# Patient Record
Sex: FEMALE | Race: WHITE | Employment: FULL TIME | URBAN - METROPOLITAN AREA
[De-identification: names, ages, dates, MRNs, and addresses within clinical notes are randomized per-mention and may not be internally consistent; named-entity substitution may affect disease eponyms.]

---

## 2017-01-05 ENCOUNTER — OFFICE VISIT (OUTPATIENT)
Dept: FAMILY MEDICINE CLINIC | Age: 52
End: 2017-01-05

## 2017-01-05 VITALS
OXYGEN SATURATION: 97 % | RESPIRATION RATE: 12 BRPM | HEIGHT: 65 IN | TEMPERATURE: 98.1 F | DIASTOLIC BLOOD PRESSURE: 87 MMHG | BODY MASS INDEX: 42.49 KG/M2 | WEIGHT: 255 LBS | HEART RATE: 71 BPM | SYSTOLIC BLOOD PRESSURE: 136 MMHG

## 2017-01-05 DIAGNOSIS — F32.A ANXIETY AND DEPRESSION: Primary | ICD-10-CM

## 2017-01-05 DIAGNOSIS — M79.642 PAIN IN BOTH HANDS: ICD-10-CM

## 2017-01-05 DIAGNOSIS — Z12.39 SCREENING FOR MALIGNANT NEOPLASM OF BREAST: ICD-10-CM

## 2017-01-05 DIAGNOSIS — M79.641 PAIN IN BOTH HANDS: ICD-10-CM

## 2017-01-05 DIAGNOSIS — F41.9 ANXIETY AND DEPRESSION: Primary | ICD-10-CM

## 2017-01-05 RX ORDER — BUPROPION HYDROCHLORIDE 300 MG/1
TABLET ORAL
COMMUNITY
Start: 2016-02-24 | End: 2016-02-24

## 2017-01-05 RX ORDER — DICLOFENAC SODIUM 75 MG/1
75 TABLET, DELAYED RELEASE ORAL
Qty: 60 TAB | Refills: 0 | Status: SHIPPED | OUTPATIENT
Start: 2017-01-05 | End: 2018-07-19

## 2017-01-05 RX ORDER — BUPROPION HYDROCHLORIDE 300 MG/1
300 TABLET ORAL
Qty: 90 TAB | Refills: 1 | Status: SHIPPED | OUTPATIENT
Start: 2017-01-05 | End: 2018-07-19 | Stop reason: SDUPTHER

## 2017-01-05 RX ORDER — DICLOFENAC SODIUM 10 MG/G
GEL TOPICAL 4 TIMES DAILY
Qty: 100 G | Refills: 5 | Status: SHIPPED | OUTPATIENT
Start: 2017-01-05 | End: 2018-10-10

## 2017-01-05 RX ORDER — ESCITALOPRAM OXALATE 10 MG/1
10 TABLET ORAL DAILY
Qty: 90 TAB | Refills: 1 | Status: SHIPPED | OUTPATIENT
Start: 2017-01-05 | End: 2017-07-28 | Stop reason: SDUPTHER

## 2017-01-05 NOTE — MR AVS SNAPSHOT
Visit Information Date & Time Provider Department Dept. Phone Encounter #  
 1/5/2017 11:00 AM Mahinjeanna James, NP [address] [facility] [phone] [id number] Follow-up Instructions Return if symptoms worsen or fail to improve. Upcoming Health Maintenance Date Due Hepatitis C Screening 1965 BREAST CANCER SCRN MAMMOGRAM 7/3/2014 FOBT Q 1 YEAR AGE 50-75 7/3/2014 PAP AKA CERVICAL CYTOLOGY 1/5/2020 DTaP/Tdap/Td series (2 - Td) 1/5/2027 Allergies as of 1/5/2017  Review Complete On: 1/5/2017 By: Sindhu Rivera LPN Severity Noted Reaction Type Reactions Levaquin [Levofloxacin]  06/16/2014    Rash Pcn [Penicillins]  06/16/2014    Rash Percocet [Oxycodone-acetaminophen]  10/16/2014    Nausea and Vomiting \"It upsets my stomach. \"  
  
Current Immunizations  Never Reviewed No immunizations on file. Not reviewed this visit You Were Diagnosed With   
  
 Codes Comments Anxiety and depression    -  Primary ICD-10-CM: F41.9, F32.9 ICD-9-CM: 300.00, 311 Pain in both hands     ICD-10-CM: M79.641, H10.131 ICD-9-CM: 729.5 Screening for malignant neoplasm of breast     ICD-10-CM: Z12.39 
ICD-9-CM: V76.10 Vitals BP Pulse Temp Resp Height(growth percentile) Weight(growth percentile) 136/87 71 98.1 °F (36.7 °C) 12 5' 5\" (1.651 m) 255 lb (115.7 kg) LMP SpO2 BMI OB Status Smoking Status 12/21/2016 (Exact Date) 97% 42.43 kg/m2 Unknown Never Smoker Vitals History BMI and BSA Data Body Mass Index Body Surface Area  
 42.43 kg/m 2 2.3 m 2 Preferred Pharmacy Pharmacy Name Phone [facility] [address] [phone] Your Updated Medication List  
  
   
This list is accurate as of: 1/5/17 11:36 AM.  Always use your most recent med list.  
  
  
  
  
 albuterol 90 mcg/actuation inhaler Commonly known as:  PROVENTIL HFA, VENTOLIN HFA, PROAIR HFA Take 2 Puffs by inhalation every four (4) hours as needed for Wheezing. buPROPion  mg XL tablet Commonly known as:  Timothy Mast Take 1 Tab by mouth every morning. * diclofenac EC 75 mg EC tablet Commonly known as:  VOLTAREN Take 1 Tab by mouth two (2) times daily as needed. * diclofenac 1 % Gel Commonly known as:  VOLTAREN Apply  to affected area four (4) times daily. escitalopram oxalate 10 mg tablet Commonly known as:  [place] Denier Take 1 Tab by mouth daily. rosuvastatin 5 mg tablet Commonly known as:  CRESTOR Take 1 Tab by mouth nightly. * topiramate 25 mg tablet Commonly known as:  TOPAMAX 25 mg nightly for 1 week then, 50 mg nightly for 1 week then, 75 mg nightly for 1 week then, 100mg nightly thereafter. * topiramate 100 mg tablet Commonly known as:  TOPAMAX Take 1 Tab by mouth nightly. traMADol 50 mg tablet Commonly known as:  ULTRAM  
1-2 po q 8 hours prn pain TYLENOL 325 mg tablet Generic drug:  acetaminophen Take 1,000 mg by mouth every four (4) hours as needed for Pain. VITAMIN D3 1,000 unit Cap Generic drug:  cholecalciferol Take 2,000 Units by mouth daily (after breakfast). * Notice: This list has 4 medication(s) that are the same as other medications prescribed for you. Read the directions carefully, and ask your doctor or other care provider to review them with you. Prescriptions Sent to Pharmacy Refills  
 escitalopram oxalate (LEXAPRO) 10 mg tablet 1 Sig: Take 1 Tab by mouth daily. Class: Normal  
 Pharmacy: [address] #: [phone] Route: Oral  
 buPROPion XL (WELLBUTRIN XL) 300 mg XL tablet 1 Sig: Take 1 Tab by mouth every morning.   
 Class: Normal  
 Pharmacy: [address] [facility] Ph #: [phone] Route: Oral  
 diclofenac EC (VOLTAREN) 75 mg EC tablet 0 Sig: Take 1 Tab by mouth two (2) times daily as needed. Class: Normal  
 Pharmacy: [address] Ph #: [phone] Route: Oral  
 diclofenac (VOLTAREN) 1 % gel 5 Sig: Apply  to affected area four (4) times daily. Class: Normal  
 Pharmacy: [address] Ph #: [phone] Route: Topical  
  
Follow-up Instructions Return if symptoms worsen or fail to improve. To-Do List   
 01/05/2017 Imaging:  XR HAND LT AP/LAT   
  
 01/05/2017 Imaging:  XR HAND RT AP/LAT   
  
 01/06/2017 Imaging:  PELON MAMMO BI SCREENING INCL CAD Introducing [facility]! Dear Magdiel Leonardo: 
Thank you for requesting a [email or web address] account. Our records indicate that you have previously registered for a [email or web address] account but its currently inactive. Please call our [email or web address] support line at [phone]. Additional Information If you have questions, please visit the Frequently Asked Questions section of the [email or web address] website at [email or web address] Remember, [email or web address] is NOT to be used for urgent needs. For medical emergencies, dial 911. Now available from your iPhone and Android! Please provide this summary of care documentation to your next provider. Your primary care clinician is listed as Maritza Ruano. If you have any questions after today's visit, please call [phone].

## 2017-01-05 NOTE — PROGRESS NOTES
Chief Complaint   Patient presents with   [facility] Establish Care    Hand Pain     she is a 45y.o. year old female who presents for evalution. Pt states looking for new PCP, no longer felt comfortable with last provider. Pt states has been having thumb pain for past few years, worsening past year. Pain worse at night time but also present during day. Some problem with  strength. Worse with movements. At times will radiate into different fingers and up into forearm but when that happens is different type of pain and is extremely intermittent and only lasts for a few seconds. Needs refills on Lexapro and Wellbutrin. Has been taking past 3 years for anxiety and depression and feels well on current dose of medication. Reviewed PmHx, RxHx, FmHx, SocHx, AllgHx and updated and dated in the chart. Review of Systems - negative except as listed above in the HPI    Objective:     Vitals:    01/05/17 1115   BP: 136/87   Pulse: 71   Resp: 12   Temp: 98.1 °F (36.7 °C)   SpO2: 97%   Weight: 255 lb (115.7 kg)   Height: 5' 5\" (1.651 m)     Physical Examination: General appearance - alert, well appearing, and in no distress  Mental status - alert, oriented to person, place, and time, normal mood, behavior, speech, dress, motor activity, and thought processes  Chest - clear to auscultation, no wheezes, rales or rhonchi, symmetric air entry  Heart - normal rate, regular rhythm, normal S1, S2, no murmurs, rubs, clicks or gallops  Musculoskeletal - abnormal exam of both hands  Movements with thumbs painful, tenderness in MCP joint 1st digit, no deformity     Assessment/ Plan:   Kelly was seen today for establish care and hand pain. Diagnoses and all orders for this visit:    Anxiety and depression  -     escitalopram oxalate (LEXAPRO) 10 mg tablet; Take 1 Tab by mouth daily. -     buPROPion XL (WELLBUTRIN XL) 300 mg XL tablet; Take 1 Tab by mouth every morning. Stable, refills provided.      Pain in both hands  - diclofenac EC (VOLTAREN) 75 mg EC tablet; Take 1 Tab by mouth two (2) times daily as needed. -     diclofenac (VOLTAREN) 1 % gel; Apply  to affected area four (4) times daily. -     XR HAND RT AP/LAT; Future  -     XR HAND LT AP/LAT; Future  New rx. Pt would like imaging to eval for arthritis. F/U prn    Screening for malignant neoplasm of breast  -     PELON MAMMO BI SCREENING INCL CAD; Future     Pt voiced understanding regarding plan of care. Follow-up Disposition:  Return if symptoms worsen or fail to improve. I have discussed the diagnosis with the patient and the intended plan as seen in the above orders. The patient has received an after-visit summary and questions were answered concerning future plans.      Medication Side Effects and Warnings were discussed with patient    Padmini Rascon NP

## 2017-01-05 NOTE — PROGRESS NOTES
Here to est care  needs med refills. Reports severe pain in her thumb joints intermittently for several years. Has worsened or the past year.

## 2017-03-26 ENCOUNTER — OFFICE VISIT (OUTPATIENT)
Dept: FAMILY MEDICINE CLINIC | Facility: CLINIC | Age: 52
End: 2017-03-26

## 2017-03-26 VITALS
RESPIRATION RATE: 17 BRPM | TEMPERATURE: 99.7 F | SYSTOLIC BLOOD PRESSURE: 136 MMHG | WEIGHT: 262 LBS | DIASTOLIC BLOOD PRESSURE: 76 MMHG | HEART RATE: 84 BPM | HEIGHT: 65 IN | BODY MASS INDEX: 43.65 KG/M2

## 2017-03-26 DIAGNOSIS — J09.X2 INFLUENZA A (H5N1): Primary | ICD-10-CM

## 2017-03-26 LAB
QUICKVUE INFLUENZA TEST: POSITIVE
VALID INTERNAL CONTROL?: YES

## 2017-03-26 RX ORDER — OSELTAMIVIR PHOSPHATE 75 MG/1
75 CAPSULE ORAL 2 TIMES DAILY
Qty: 10 CAP | Refills: 0 | Status: SHIPPED | OUTPATIENT
Start: 2017-03-26 | End: 2017-03-31

## 2017-03-26 NOTE — PROGRESS NOTES
Subjective: (As above and below)     Chief Complaint   Patient presents with    Other     cough, body aches fever     she is a 45y.o. year old female who presents for evaluation. 48 hour of cough, body aches, chills, fever. Rapid onset. Symptoms constant and not improved. Tmax 102.8 F  Took nyquil and ibuprofen with temporary relief. Drinking plenty of fluids. No known sick contacts. Denies: rashes  Review of Systems - negative except as listed above    Reviewed PmHx, RxHx, FmHx, SocHx, AllgHx and updated in chart. Family History   Problem Relation Age of Onset    Cancer Father      bladder and skin cancer    Elevated Lipids Father     Stroke Father      x 2    Dementia Mother        Past Medical History:   Diagnosis Date    Depression     depression and anxiety    Headache     High cholesterol     Morbid obesity ([address]       Social History     Social History    Marital status:      Spouse name: N/A    Number of children: N/A    Years of education: N/A     Social History Main Topics    Smoking status: Never Smoker    Smokeless tobacco: Never Used    Alcohol use Yes      Comment: rare    Drug use: No    Sexual activity: Yes     Other Topics Concern    None     Social History Narrative          Current Outpatient Prescriptions   Medication Sig    oseltamivir (TAMIFLU) 75 mg capsule Take 1 Cap by mouth two (2) times a day for 5 days.  escitalopram oxalate (LEXAPRO) 10 mg tablet Take 1 Tab by mouth daily.  buPROPion XL (WELLBUTRIN XL) 300 mg XL tablet Take 1 Tab by mouth every morning.  diclofenac EC (VOLTAREN) 75 mg EC tablet Take 1 Tab by mouth two (2) times daily as needed.  diclofenac (VOLTAREN) 1 % gel Apply  to affected area four (4) times daily.  traMADol (ULTRAM) 50 mg tablet 1-2 po q 8 hours prn pain    rosuvastatin (CRESTOR) 5 mg tablet Take 1 Tab by mouth nightly.     acetaminophen (TYLENOL) 325 mg tablet Take 1,000 mg by mouth every four (4) hours as needed for Pain.  Cholecalciferol, Vitamin D3, (VITAMIN D3) 1,000 unit cap Take 2,000 Units by mouth daily (after breakfast). No current facility-administered medications for this visit. Objective:     Vitals:    03/26/17 1314   BP: 136/76   Pulse: 84   Resp: 17   Temp: 99.7 °F (37.6 °C)   TempSrc: Oral   Weight: 262 lb (118.8 kg)   Height: 5' 5\" (1.651 m)       Physical Examination:   General appearance - Does not appear toxic. Is in no distress. Well hydrated. Mental status - normal mood, behavior, speech, dress, motor activity, and thought processes  Eyes - pupils equal and reactive, extraocular eye movements intact, sclera anicteric  Ears - bilateral TM's and external ear canals normal  Nose - Decreased nasal patency R and L with clear rhinorrhea. Mouth - minimal oropharyngeal erythema. No swelling or exudates. Uvula midline. Mucus membranes moist.  Neck - No LAD. Chest - normal respiratory effort. clear to auscultation, no wheezes, rales or rhonchi, symmetric air entry  Heart - normal rate, regular rhythm, normal S1, S2, no murmurs, rubs, clicks or gallops  Neurological - cranial nerves II through XII intact  Skin - normal coloration, no rashes, no suspicious skin lesions noted        Assessment/ Plan:       ICD-10-CM ICD-9-CM    1. Influenza A (H5N1) J09. X2 488.02 AMB POC RAPID INFLUENZA TEST      oseltamivir (TAMIFLU) 75 mg capsule        1. Influenza A (H5N1)    Rapid test positive for: influenza A without complication at this time. We have discussed risks/benefits of Tamiflu and indications for individuals at higher risk of complications: informed decision to initiate treatment   Supportive therapy and monitoring of symptoms:  Tylenol (acetiminophen) for fever and or general discomforts. Do not combine with other OTC medications which contain Tylenol (acetiminophen)   Maintain adequate fluid intake to avoid dehydration.   Throat lozenges or warm fluids (tea/water) with honey  Humidified air at night may provide some relief. Letter written for work. Orders:  - AMB POC RAPID INFLUENZA TEST  - oseltamivir (TAMIFLU) 75 mg capsule; Take 1 Cap by mouth two (2) times a day for 5 days. Dispense: 10 Cap; Refill: 0    We have reviewed concerning signs/symptoms, normal vs abnormal progression of medical condition and when to seek immediate medical attention. Schedule with PCP or Urgent Care immediately for worsening or new symptoms. I have discussed the diagnosis with the patient and the intended plan as seen in the above orders. The patient has received an after-visit summary and questions were answered concerning future plans.      Medication Side Effects and Warnings were discussed with patient: yes  Patient Labs were reviewed: yes  Patient Past Records were reviewed:  yes    Cyndee Heck NP

## 2017-03-26 NOTE — PATIENT INSTRUCTIONS
Influenza (Flu): Care Instructions  Your Care Instructions  Influenza (flu) is an infection in the lungs and breathing passages. It is caused by the influenza virus. There are different strains, or types, of the flu virus from year to year. Unlike the common cold, the flu comes on suddenly and the symptoms, such as a cough, congestion, fever, chills, fatigue, aches, and pains, are more severe. These symptoms may last up to 10 days. Although the flu can make you feel very sick, it usually doesn't cause serious health problems. Home treatment is usually all you need for flu symptoms. But your doctor may prescribe antiviral medicine to prevent other health problems, such as pneumonia, from developing. Older people and those who have a long-term health condition, such as lung disease, are most at risk for having pneumonia or other health problems. Follow-up care is a key part of your treatment and safety. Be sure to make and go to all appointments, and call your doctor if you are having problems. Its also a good idea to know your test results and keep a list of the medicines you take. How can you care for yourself at home? · Get plenty of rest.  · Drink plenty of fluids, enough so that your urine is light yellow or clear like water. If you have kidney, heart, or liver disease and have to limit fluids, talk with your doctor before you increase the amount of fluids you drink. · Take an over-the-counter pain medicine if needed, such as acetaminophen (Tylenol), ibuprofen (Advil, Motrin), or naproxen (Aleve), to relieve fever, headache, and muscle aches. Read and follow all instructions on the label. No one younger than 20 should take aspirin. It has been linked to Reye syndrome, a serious illness. · Do not smoke. Smoking can make the flu worse. If you need help quitting, talk to your doctor about stop-smoking programs and medicines. These can increase your chances of quitting for good.   · Breathe moist air from a hot shower or from a sink filled with hot water to help clear a stuffy nose. · Before you use cough and cold medicines, check the label. These medicines may not be safe for young children or for people with certain health problems. · If the skin around your nose and lips becomes sore, put some petroleum jelly on the area. · To ease coughing:  ¨ Drink fluids to soothe a scratchy throat. ¨ Suck on cough drops or plain hard candy. ¨ Take an over-the-counter cough medicine that contains dextromethorphan to help you get some sleep. Read and follow all instructions on the label. ¨ Raise your head at night with an extra pillow. This may help you rest if coughing keeps you awake. · Take any prescribed medicine exactly as directed. Call your doctor if you think you are having a problem with your medicine. To avoid spreading the flu  · Wash your hands regularly, and keep your hands away from your face. · Stay home from school, work, and other public places until you are feeling better and your fever has been gone for at least 24 hours. The fever needs to have gone away on its own without the help of medicine. · Ask people living with you to talk to their doctors about preventing the flu. They may get antiviral medicine to keep from getting the flu from you. · To prevent the flu in the future, get a flu vaccine every fall. Encourage people living with you to get the vaccine. · Cover your mouth when you cough or sneeze. When should you call for help? Call 911 anytime you think you may need emergency care. For example, call if:  · You have severe trouble breathing. Call your doctor now or seek immediate medical care if:  · You have new or worse trouble breathing. · You seem to be getting much sicker. · You feel very sleepy or confused. · You have a new or higher fever. · You get a new rash.   Watch closely for changes in your health, and be sure to contact your doctor if:  · You begin to get better and then get worse. · You are not getting better after 1 week. Where can you learn more? Go to http://neva-parish.info/. Enter J395 in the search box to learn more about \"Influenza (Flu): Care Instructions. \"  Current as of: May 23, 2016  Content Version: 11.1  © 2006-2016 [employer]. Care instructions adapted under license by [employer] (which disclaims liability or warranty for this information). If you have questions about a medical condition or this instruction, always ask your healthcare professional. [address] any warranty or liability for your use of this information. Oseltamivir (By mouth)   Oseltamivir (sn-daf-MVB-i-vir)  Treats and helps prevent influenza. Brand Name(s):Tamiflu   There may be other brand names for this medicine. When This Medicine Should Not Be Used: This medicine is not right for everyone. Do not use it if you had an allergic reaction to oseltamivir. How to Use This Medicine:   Capsule, Liquid  · Your doctor will tell you how much medicine to use. Do not use more than directed. Do not take this medicine for any illness other than the flu. · Start taking this medicine as soon as possible after flu symptoms begin or after you are exposed to the flu (within the first 2 days). · Shake the oral liquid before each use. Measure the liquid medicine with the oral dispenser that came with the medicine. Ask your pharmacist for an oral measuring spoon or syringe if you do not have one. · You may open the capsule and mix the contents with a sweet liquid (such as chocolate syrup, corn syrup, or sugar dissolved in water). Ask your doctor or pharmacist if you have any questions. · Read and follow the patient instructions that come with this medicine. Talk to your doctor or pharmacist if you have any questions. · Missed dose:  If you miss a dose and your next dose is due within 2 hours, skip the missed dose and take your medicine at the normal time. Do not use extra medicine to make up for a missed dose. If you miss a dose and your next dose is due in more than 2 hours, take the missed dose as soon as you can. Then take your next dose at the normal time, and go back to your regular schedule. · Capsules: Store at room temperature, away from heat, moisture, and direct light. · Oral liquid: Store in the refrigerator and use within 17 days. Do not freeze. You may also store the medicine at room temperature, but use it within 10 days. Throw away any medicine that has not been used within this time. Drugs and Foods to Avoid:   Ask your doctor or pharmacist before using any other medicine, including over-the-counter medicines, vitamins, and herbal products. · Do not use this medicine if you have received the live influenza vaccine (nasal mist) in the past 2 weeks or if you will receive the vaccine within 48 hours, unless your doctor says it is okay. Warnings While Using This Medicine:   · Tell your doctor if you are pregnant or breastfeeding, or if you have kidney disease, liver disease, heart disease, lung disease, or a weakened immune system. · This medicine may cause the following problems:   ¨ Serious skin reactions  ¨ Unusual thoughts or behaviors  · Call your doctor if your symptoms do not improve or if they get worse. · The liquid form of this medicine contains sorbitol. Tell your doctor if you have hereditary fructose intolerance. · This medicine is not a substitute for a yearly flu shot. It also will not prevent a bacterial infection. · Keep all medicine out of the reach of children. Never share your medicine with anyone.   Possible Side Effects While Using This Medicine:   Call your doctor right away if you notice any of these side effects:  · Allergic reaction: Itching or hives, swelling in your face or hands, swelling or tingling in your mouth or throat, chest tightness, trouble breathing  · Blistering, peeling, red skin rash  · Confusion, agitation, seeing or hearing things that are not there, change in mood or behavior, seizures  · Fever, chills, cough, sore throat, body aches  If you notice these less serious side effects, talk with your doctor:   · Nausea, vomiting, diarrhea, stomach pain, or upset stomach  If you notice other side effects that you think are caused by this medicine, tell your doctor. Call your doctor for medical advice about side effects. You may report side effects to FDA at 1-844-NJM-8964  © 2016 [address] is for End User's use only and may not be sold, redistributed or otherwise used for commercial purposes. The above information is an  only. It is not intended as medical advice for individual conditions or treatments. Talk to your doctor, nurse or pharmacist before following any medical regimen to see if it is safe and effective for you.

## 2017-03-26 NOTE — LETTER
NOTIFICATION RETURN TO WORK / SCHOOL 
 
3/26/2017 1:27 PM 
 
Ms. Fredrick Black [address] [place] [address] To Whom It May Concern: 
 
Fredrick Black is currently under the care of [address] [place] [address]. She will return to work on: 03/30/2017 If there are questions or concerns please have the patient contact our office. Sincerely, Robert Rivera NP

## 2017-03-26 NOTE — MR AVS SNAPSHOT
Visit Information Date & Time Provider Department Dept. Phone Encounter #  
 3/26/2017  1:00 PM Marchfransico Dung, [address] [phone] [id number] Upcoming Health Maintenance Date Due Hepatitis C Screening 1965 BREAST CANCER SCRN MAMMOGRAM 7/3/2014 FOBT Q 1 YEAR AGE 50-75 7/3/2014 PAP AKA CERVICAL CYTOLOGY 1/5/2020 DTaP/Tdap/Td series (2 - Td) 1/5/2027 Allergies as of 3/26/2017  Review Complete On: 3/26/2017 By: Kapil Razo NP Severity Noted Reaction Type Reactions Levaquin [Levofloxacin]  06/16/2014    Rash Pcn [Penicillins]  06/16/2014    Rash Percocet [Oxycodone-acetaminophen]  10/16/2014    Nausea and Vomiting \"It upsets my stomach. \"  
  
Current Immunizations  Never Reviewed No immunizations on file. Not reviewed this visit You Were Diagnosed With   
  
 Codes Comments Influenza A (H5N1)    -  Primary ICD-10-CM: J09. X2 
ICD-9-CM: 488.02 Vitals BP Pulse Temp Resp Height(growth percentile) Weight(growth percentile) 136/76 84 99.7 °F (37.6 °C) (Oral) 17 5' 5\" (1.651 m) 262 lb (118.8 kg) BMI OB Status Smoking Status 43.6 kg/m2 Unknown Never Smoker Vitals History BMI and BSA Data Body Mass Index Body Surface Area  
 43.6 kg/m 2 2.33 m 2 Preferred Pharmacy Pharmacy Name Phone [address] [phone] Your Updated Medication List  
  
   
This list is accurate as of: 3/26/17  1:27 PM.  Always use your most recent med list.  
  
  
  
  
 buPROPion  mg XL tablet Commonly known as:  Chand Fuelling Take 1 Tab by mouth every morning. * diclofenac EC 75 mg EC tablet Commonly known as:  VOLTAREN Take 1 Tab by mouth two (2) times daily as needed. * diclofenac 1 % Gel Commonly known as:  VOLTAREN Apply  to affected area four (4) times daily. escitalopram oxalate 10 mg tablet Commonly known as:  Colan Big Take 1 Tab by mouth daily. oseltamivir 75 mg capsule Commonly known as:  TAMIFLU Take 1 Cap by mouth two (2) times a day for 5 days. rosuvastatin 5 mg tablet Commonly known as:  CRESTOR Take 1 Tab by mouth nightly. traMADol 50 mg tablet Commonly known as:  ULTRAM  
1-2 po q 8 hours prn pain TYLENOL 325 mg tablet Generic drug:  acetaminophen Take 1,000 mg by mouth every four (4) hours as needed for Pain. VITAMIN D3 1,000 unit Cap Generic drug:  cholecalciferol Take 2,000 Units by mouth daily (after breakfast). * Notice: This list has 2 medication(s) that are the same as other medications prescribed for you. Read the directions carefully, and ask your doctor or other care provider to review them with you. Prescriptions Sent to Pharmacy Refills  
 oseltamivir (TAMIFLU) 75 mg capsule 0 Sig: Take 1 Cap by mouth two (2) times a day for 5 days. Class: Normal  
 Pharmacy: [address] #: [phone] Route: Oral  
  
We Performed the Following AMB POC RAPID INFLUENZA TEST [16391 CPT(R)] Patient Instructions Influenza (Flu): Care Instructions Your Care Instructions Influenza (flu) is an infection in the lungs and breathing passages. It is caused by the influenza virus. There are different strains, or types, of the flu virus from year to year. Unlike the common cold, the flu comes on suddenly and the symptoms, such as a cough, congestion, fever, chills, fatigue, aches, and pains, are more severe. These symptoms may last up to 10 days. Although the flu can make you feel very sick, it usually doesn't cause serious health problems. Home treatment is usually all you need for flu symptoms.  But your doctor may prescribe antiviral medicine to prevent other health problems, such as pneumonia, from developing. Older people and those who have a long-term health condition, such as lung disease, are most at risk for having pneumonia or other health problems. Follow-up care is a key part of your treatment and safety. Be sure to make and go to all appointments, and call your doctor if you are having problems. Its also a good idea to know your test results and keep a list of the medicines you take. How can you care for yourself at home? · Get plenty of rest. 
· Drink plenty of fluids, enough so that your urine is light yellow or clear like water. If you have kidney, heart, or liver disease and have to limit fluids, talk with your doctor before you increase the amount of fluids you drink. · Take an over-the-counter pain medicine if needed, such as acetaminophen (Tylenol), ibuprofen (Advil, Motrin), or naproxen (Aleve), to relieve fever, headache, and muscle aches. Read and follow all instructions on the label. No one younger than 20 should take aspirin. It has been linked to Reye syndrome, a serious illness. · Do not smoke. Smoking can make the flu worse. If you need help quitting, talk to your doctor about stop-smoking programs and medicines. These can increase your chances of quitting for good. · Breathe moist air from a hot shower or from a sink filled with hot water to help clear a stuffy nose. · Before you use cough and cold medicines, check the label. These medicines may not be safe for young children or for people with certain health problems. · If the skin around your nose and lips becomes sore, put some petroleum jelly on the area. · To ease coughing: ¨ Drink fluids to soothe a scratchy throat. ¨ Suck on cough drops or plain hard candy. ¨ Take an over-the-counter cough medicine that contains dextromethorphan to help you get some sleep. Read and follow all instructions on the label. ¨ Raise your head at night with an extra pillow.  This may help you rest if coughing keeps you awake. · Take any prescribed medicine exactly as directed. Call your doctor if you think you are having a problem with your medicine. To avoid spreading the flu · Wash your hands regularly, and keep your hands away from your face. · Stay home from school, work, and other public places until you are feeling better and your fever has been gone for at least 24 hours. The fever needs to have gone away on its own without the help of medicine. · Ask people living with you to talk to their doctors about preventing the flu. They may get antiviral medicine to keep from getting the flu from you. · To prevent the flu in the future, get a flu vaccine every fall. Encourage people living with you to get the vaccine. · Cover your mouth when you cough or sneeze. When should you call for help? Call 911 anytime you think you may need emergency care. For example, call if: 
· You have severe trouble breathing. Call your doctor now or seek immediate medical care if: 
· You have new or worse trouble breathing. · You seem to be getting much sicker. · You feel very sleepy or confused. · You have a new or higher fever. · You get a new rash. Watch closely for changes in your health, and be sure to contact your doctor if: 
· You begin to get better and then get worse. · You are not getting better after 1 week. Where can you learn more? Go to http://neva-parish.info/. Enter K628 in the search box to learn more about \"Influenza (Flu): Care Instructions. \" Current as of: May 23, 2016 Content Version: 11.1 © 6868-3806 Healthwise, Incorporated. Care instructions adapted under license by [employer] (which disclaims liability or warranty for this information). If you have questions about a medical condition or this instruction, always ask your healthcare professional. [address] any warranty or liability for your use of this information. Oseltamivir (By mouth) Oseltamivir (mj-cae-XSI-i-vir) Treats and helps prevent influenza. Brand Name(s):Tamiflu There may be other brand names for this medicine. When This Medicine Should Not Be Used: This medicine is not right for everyone. Do not use it if you had an allergic reaction to oseltamivir. How to Use This Medicine:  
Capsule, Liquid · Your doctor will tell you how much medicine to use. Do not use more than directed. Do not take this medicine for any illness other than the flu. · Start taking this medicine as soon as possible after flu symptoms begin or after you are exposed to the flu (within the first 2 days). · Shake the oral liquid before each use. Measure the liquid medicine with the oral dispenser that came with the medicine. Ask your pharmacist for an oral measuring spoon or syringe if you do not have one. · You may open the capsule and mix the contents with a sweet liquid (such as chocolate syrup, corn syrup, or sugar dissolved in water). Ask your doctor or pharmacist if you have any questions. · Read and follow the patient instructions that come with this medicine. Talk to your doctor or pharmacist if you have any questions. · Missed dose: If you miss a dose and your next dose is due within 2 hours, skip the missed dose and take your medicine at the normal time. Do not use extra medicine to make up for a missed dose. If you miss a dose and your next dose is due in more than 2 hours, take the missed dose as soon as you can. Then take your next dose at the normal time, and go back to your regular schedule. · Capsules: Store at room temperature, away from heat, moisture, and direct light. · Oral liquid: Store in the refrigerator and use within 17 days. Do not freeze. You may also store the medicine at room temperature, but use it within 10 days. Throw away any medicine that has not been used within this time. Drugs and Foods to Avoid: Ask your doctor or pharmacist before using any other medicine, including over-the-counter medicines, vitamins, and herbal products. · Do not use this medicine if you have received the live influenza vaccine (nasal mist) in the past 2 weeks or if you will receive the vaccine within 48 hours, unless your doctor says it is okay. Warnings While Using This Medicine: · Tell your doctor if you are pregnant or breastfeeding, or if you have kidney disease, liver disease, heart disease, lung disease, or a weakened immune system. · This medicine may cause the following problems:  
¨ Serious skin reactions ¨ Unusual thoughts or behaviors · Call your doctor if your symptoms do not improve or if they get worse. · The liquid form of this medicine contains sorbitol. Tell your doctor if you have hereditary fructose intolerance. · This medicine is not a substitute for a yearly flu shot. It also will not prevent a bacterial infection. · Keep all medicine out of the reach of children. Never share your medicine with anyone. Possible Side Effects While Using This Medicine:  
Call your doctor right away if you notice any of these side effects: · Allergic reaction: Itching or hives, swelling in your face or hands, swelling or tingling in your mouth or throat, chest tightness, trouble breathing · Blistering, peeling, red skin rash · Confusion, agitation, seeing or hearing things that are not there, change in mood or behavior, seizures · Fever, chills, cough, sore throat, body aches If you notice these less serious side effects, talk with your doctor: · Nausea, vomiting, diarrhea, stomach pain, or upset stomach If you notice other side effects that you think are caused by this medicine, tell your doctor. Call your doctor for medical advice about side effects. You may report side effects to FDA at 5-115-FDA-5604 © 2016 50[address] Thuy Khadra is for End User's use only and may not be sold, redistributed or otherwise used for commercial purposes. The above information is an  only. It is not intended as medical advice for individual conditions or treatments. Talk to your doctor, nurse or pharmacist before following any medical regimen to see if it is safe and effective for you. Introducing [facility] & HEALTH SERVICES! Dear Jing Conception: 
Thank you for requesting a Pind[employer] account. Our records indicate that you already have an active Pind[employer] account. You can access your account anytime at [email or web address] Did you know that you can access your hospital and ER discharge instructions at any time in Pindrop Security? You can also review all of your test results from your hospital stay or ER visit. Additional Information If you have questions, please visit the Frequently Asked Questions section of the Pind[employer] website at [email or web address] Remember, Pindrop Security is NOT to be used for urgent needs. For medical emergencies, dial 911. Now available from your iPhone and Android! Please provide this summary of care documentation to your next provider. Your primary care clinician is listed as Maritza 68. If you have any questions after today's visit, please call [phone].

## 2017-08-09 ENCOUNTER — TELEPHONE (OUTPATIENT)
Dept: NEUROLOGY | Age: 52
End: 2017-08-09

## 2017-08-09 RX ORDER — SUMATRIPTAN AND NAPROXEN SODIUM 85; 500 MG/1; MG/1
TABLET, FILM COATED ORAL
Qty: 6 TAB | Refills: 0 | Status: SHIPPED | COMMUNITY
Start: 2017-08-09 | End: 2018-10-10

## 2017-08-09 RX ORDER — SUMATRIPTAN AND NAPROXEN SODIUM 85; 500 MG/1; MG/1
TABLET, FILM COATED ORAL
Qty: 9 TAB | Refills: 3 | Status: SHIPPED | OUTPATIENT
Start: 2017-08-09 | End: 2018-10-10

## 2017-08-09 NOTE — TELEPHONE ENCOUNTER
Spoke to patient who reports daily headaches for several months. Patient was on qudexy but when increased to 50 mg, it caused unset stomach. New order per provider for trokendi XR 25 mg , 1 tab every day. And treximet PRN or headaches. Samples given.

## 2017-11-03 DIAGNOSIS — R68.2 DRY MOUTH: ICD-10-CM

## 2017-11-03 DIAGNOSIS — M25.50 ARTHRALGIA, UNSPECIFIED JOINT: Primary | ICD-10-CM

## 2017-11-03 DIAGNOSIS — M25.50 ARTHRALGIA, UNSPECIFIED JOINT: ICD-10-CM

## 2017-11-03 NOTE — PROGRESS NOTES
Patient called today regarding some new symptoms. She is having pain in her bilateral shoulders, thumbs and metacarpal joints and also in her hips and ankles. She had a rheumatoid factor done at primary care that was said to be normal.  She has not had any other connective tissue disorder labs sent. She is having dry mouth. No focal symptoms. We will go ahead and send an JUSTIN as well as Sjogren antibodies and refer her to rheumatology.

## 2017-11-06 ENCOUNTER — TELEPHONE (OUTPATIENT)
Dept: NEUROLOGY | Age: 52
End: 2017-11-06

## 2017-11-07 LAB
ANA SER QL: NEGATIVE
ENA SS-A AB SER-ACNC: <0.2 AI (ref 0–0.9)
ENA SS-B AB SER-ACNC: <0.2 AI (ref 0–0.9)

## 2018-01-24 RX ORDER — CIPROFLOXACIN 500 MG/1
500 TABLET ORAL 2 TIMES DAILY
Qty: 20 TAB | Refills: 0 | Status: SHIPPED | OUTPATIENT
Start: 2018-01-24 | End: 2018-02-03

## 2018-01-24 NOTE — PROGRESS NOTES
Patient comes in today with complaints of pain and pressure about the right side of the face and head. This is been ongoing for several days. Worse with movement. If she bends forward she gets more pressure. No fever chills. No cough. No recent sick contacts. No injury. This is unlike her typical migraine. No rhinorrhea. No hearing difficulty. No change in vision. Just feels a lot of pressure about the face. On examination she is awake alert oriented and appears well. Her lungs are clear. She has cerumen in the right ear but the visualized portion of the tympanic membrane is clear. Heart is regular. Pulses are symmetrical.  The globes are not injected. She is tender to palpation and percussion over the frontal and maxillary sinuses on the right side. Oropharynx is clear. Cranial nerves intact 2-12. At this juncture we are going to treat this as sinus related and she has penicillin allergy. She notes allergy to Levaquin which causes itching but notes Cipro does not make her itch. We are going to treat with Cipro to cover common and I have also asked her to get the true Sudafed from behind the counter at the pharmacy as well. Certainly if this does not clear we will explore other options such as intracranial pathology etc. but again her examination does not point to such. She will touch base with me to let me know how she is doing in a few days.     Chyna Ford MD

## 2018-05-10 ENCOUNTER — OFFICE VISIT (OUTPATIENT)
Dept: FAMILY MEDICINE CLINIC | Age: 53
End: 2018-05-10

## 2018-05-10 ENCOUNTER — TELEPHONE (OUTPATIENT)
Dept: FAMILY MEDICINE CLINIC | Age: 53
End: 2018-05-10

## 2018-05-10 VITALS
TEMPERATURE: 98.9 F | OXYGEN SATURATION: 95 % | SYSTOLIC BLOOD PRESSURE: 121 MMHG | HEIGHT: 65 IN | RESPIRATION RATE: 16 BRPM | DIASTOLIC BLOOD PRESSURE: 76 MMHG | WEIGHT: 265.9 LBS | HEART RATE: 76 BPM | BODY MASS INDEX: 44.3 KG/M2

## 2018-05-10 DIAGNOSIS — E66.01 MORBID OBESITY WITH BMI OF 40.0-44.9, ADULT (HCC): Primary | ICD-10-CM

## 2018-05-10 DIAGNOSIS — Z12.11 SCREENING FOR COLON CANCER: ICD-10-CM

## 2018-05-10 DIAGNOSIS — Z12.31 SCREENING MAMMOGRAM, ENCOUNTER FOR: ICD-10-CM

## 2018-05-10 RX ORDER — BLOOD SUGAR DIAGNOSTIC
STRIP MISCELLANEOUS
Qty: 50 PEN NEEDLE | Refills: 5 | Status: SHIPPED | OUTPATIENT
Start: 2018-05-10 | End: 2020-08-18

## 2018-05-10 NOTE — MR AVS SNAPSHOT
[address] [address] 
[phone] Patient: Scott Snell MRN: [id number] BJQ:[birth year] Visit Information Date & Time Provider Department Dept. Phone Encounter #  
 5/10/2018 11:15 AM Walter Liz NP [address] [facility] [phone] [id number] Follow-up Instructions Return in about 4 months (around 9/10/2018) for weight loss F/U. Upcoming Health Maintenance Date Due Hepatitis C Screening 1965 BREAST CANCER SCRN MAMMOGRAM 7/3/2014 FOBT Q 1 YEAR AGE 50-75 7/3/2014 Influenza Age 5 to Adult 8/1/2018 PAP AKA CERVICAL CYTOLOGY 1/5/2020 DTaP/Tdap/Td series (2 - Td) 1/5/2027 Allergies as of 5/10/2018  Review Complete On: 3/26/2017 By: Val Arellano NP Severity Noted Reaction Type Reactions Levaquin [Levofloxacin]  06/16/2014    Rash Pcn [Penicillins]  06/16/2014    Rash Percocet [Oxycodone-acetaminophen]  10/16/2014    Nausea and Vomiting \"It upsets my stomach. \"  
  
Current Immunizations  Never Reviewed No immunizations on file. Not reviewed this visit You Were Diagnosed With   
  
 Codes Comments Morbid obesity with BMI of 40.0-44.9, adult [facility]    -  Primary ICD-10-CM: E66.01, Z68.41 
ICD-9-CM: 278.01, V85.41 Vitals BP Pulse Temp Resp Height(growth percentile) Weight(growth percentile) 121/76 76 98.9 °F (37.2 °C) (Oral) 16 5' 5\" (1.651 m) 265 lb 14.4 oz (120.6 kg) SpO2 BMI OB Status Smoking Status 95% 44.25 kg/m2 Unknown Never Smoker BMI and BSA Data Body Mass Index Body Surface Area  
 44.25 kg/m 2 2.35 m 2 [facility] [address] [phone] Your Updated Medication List  
  
   
This list is accurate as of 5/10/18 11:39 AM.  Always use your most recent med list.  
  
  
  
  
 buPROPion  mg XL tablet Commonly known as:  Kiara Emile Take 1 Tab by mouth every morning. * diclofenac EC 75 mg EC tablet Commonly known as:  VOLTAREN Take 1 Tab by mouth two (2) times daily as needed. * diclofenac 1 % Gel Commonly known as:  VOLTAREN Apply  to affected area four (4) times daily. escitalopram oxalate 10 mg tablet Commonly known as:  Mansi Roof TAKE 1 TABLET BY MOUTH EVERY DAY  
  
 liraglutide 3 mg/0.5 mL (18 mg/3 mL) pen Commonly known as:  Celsa Sera Use SQ as directed, increase by 0.6mg per week until at 3mg dose  
  
 rosuvastatin 5 mg tablet Commonly known as:  CRESTOR Take 1 Tab by mouth nightly. * SUMAtriptan-naproxen  mg tablet Commonly known as:  TREXIMET Take one tab at headache onset, may repeat in 2 hours if needed. Max 2 tabs in 24 hours. 9 tab for 30 day supply. * SUMAtriptan-naproxen  mg tablet Commonly known as:  TREXIMET As directed. * topiramate ER 25 mg capsule Commonly known as:  TROKENDI XR Take 1 Cap by mouth daily. * topiramate ER 25 mg capsule Commonly known as:  TROKENDI XR As directed  
  
 traMADol 50 mg tablet Commonly known as:  ULTRAM  
1-2 po q 8 hours prn pain TYLENOL 325 mg tablet Generic drug:  acetaminophen Take 1,000 mg by mouth every four (4) hours as needed for Pain. VITAMIN D3 1,000 unit Cap Generic drug:  cholecalciferol Take 2,000 Units by mouth daily (after breakfast). * Notice: This list has 6 medication(s) that are the same as other medications prescribed for you. Read the directions carefully, and ask your doctor or other care provider to review them with you. Prescriptions Sent to Pharmacy Refills  
 liraglutide (SAXENDA) 3 mg/0.5 mL (18 mg/3 mL) pen 0 Sig: Use SQ as directed, increase by 0.6mg per week until at 3mg dose Class: Normal  
 Pharmacy: [address] #: [phone] Follow-up Instructions Return in about 4 months (around 9/10/2018) for weight loss F/U. Patient Instructions Starting a Weight Loss Plan: Care Instructions Your Care Instructions If you are thinking about losing weight, it can be hard to know where to start. Your doctor can help you set up a weight loss plan that best meets your needs. You may want to take a class on nutrition or exercise, or join a weight loss support group. If you have questions about how to make changes to your eating or exercise habits, ask your doctor about seeing a registered dietitian or an exercise specialist. 
It can be a big challenge to lose weight. But you do not have to make huge changes at once. Make small changes, and stick with them. When those changes become habit, add a few more changes. If you do not think you are ready to make changes right now, try to pick a date in the future. Make an appointment to see your doctor to discuss whether the time is right for you to start a plan. Follow-up care is a key part of your treatment and safety. Be sure to make and go to all appointments, and call your doctor if you are having problems. It's also a good idea to know your test results and keep a list of the medicines you take. How can you care for yourself at home? · Set realistic goals. Many people expect to lose much more weight than is likely. A weight loss of 5% to 10% of your body weight may be enough to improve your health. · Get family and friends involved to provide support. Talk to them about why you are trying to lose weight, and ask them to help. They can help by participating in exercise and having meals with you, even if they may be eating something different. · Find what works best for you. If you do not have time or do not like to cook, a program that offers meal replacement bars or shakes may be better for you.  Or if you like to prepare meals, finding a plan that includes daily menus and recipes may be best. 
· Ask your doctor about other health professionals who can help you achieve your weight loss goals. ¨ A dietitian can help you make healthy changes in your diet. ¨ An exercise specialist or  can help you develop a safe and effective exercise program. 
¨ A counselor or psychiatrist can help you cope with issues such as depression, anxiety, or family problems that can make it hard to focus on weight loss. · Consider joining a support group for people who are trying to lose weight. Your doctor can suggest groups in your area. Where can you learn more? Go to http://nevaSpecialists On Callparish.info/. Enter D436 in the search box to learn more about \"Starting a Weight Loss Plan: Care Instructions. \" Current as of: October 13, 2016 Content Version: 11.4 © 8670-5652 [employer]. Care instructions adapted under license by [employer] (which disclaims liability or warranty for this information). If you have questions about a medical condition or this instruction, always ask your healthcare professional. [facility] [address] any warranty or liability for your use of this information. Introducing [facility] & HE[facility] SERVICES! Dear [facility]: 
Thank you for requesting a Kizoom account. Our records indicate that you already have an active Kizoom account. You can access your account anytime at [email or web address] Did you know that you can access your hospital and ER discharge instructions at any time in Kizoom? You can also review all of your test results from your hospital stay or ER visit. Additional Information If you have questions, please visit the Frequently Asked Questions section of the Kizoom website at [email or web address] Remember, Kizoom is NOT to be used for urgent needs. For medical emergencies, dial 911. Now available from your iPhone and Android! Please provide this summary of care documentation to your next provider. Your primary care clinician is listed as Ysitie 68. If you have any questions after today's visit, please call [phone].

## 2018-05-10 NOTE — PROGRESS NOTES
Chief Complaint   Patient presents with    Weight Loss     Concerns     she is a 47y.o. year old female who presents for evalution. Pt would like to start on Saxenda - covered by her insurance and has friends who are being successful on it. Pt has done weight watches and atkins previously, is able to watch diet. Planning on starting walking and doing some weight bearing strength training as well. Reviewed PmHx, RxHx, FmHx, SocHx, AllgHx and updated and dated in the chart. Review of Systems - negative except as listed above in the HPI    Objective:     Vitals:    05/10/18 1125   BP: 121/76   Pulse: 76   Resp: 16   Temp: 98.9 °F (37.2 °C)   TempSrc: Oral   SpO2: 95%   Weight: 265 lb 14.4 oz (120.6 kg)   Height: 5' 5\" (1.651 m)     Physical Examination: General appearance - alert, well appearing, and in no distress  Chest - clear to auscultation, no wheezes, rales or rhonchi, symmetric air entry  Heart - normal rate, regular rhythm, normal S1, S2, no murmurs, rubs, clicks or gallops    Assessment/ Plan:   Diagnoses and all orders for this visit:    1. Morbid obesity with BMI of 40.0-44.9, adult (HCC)  -     liraglutide (SAXENDA) 3 mg/0.5 mL (18 mg/3 mL) pen; Use SQ as directed, increase by 0.6mg per week until at 3mg dose  New rx. Cont life style modifications. F/U 4 months for weight recheck, should have lost around 10lbs. 2. Screening mammogram, encounter for  -     [facility] MAMMO BI SCREENING INCL CAD; Future    3. Screening for colon cancer  -     OCCULT BLOOD, IMMUNOASSAY (FIT)     Pt voiced understanding regarding plan of care. Follow-up Disposition:  Return in about 4 months (around 9/10/2018) for weight loss F/U. I have discussed the diagnosis with the patient and the intended plan as seen in the above orders. The patient has received an after-visit summary and questions were answered concerning future plans.      Medication Side Effects and Warnings were discussed with patient    Beverley Covington Krissy Gilmore, NP

## 2018-05-10 NOTE — PATIENT INSTRUCTIONS

## 2018-05-10 NOTE — PROGRESS NOTES
1. Have you been to the ER, urgent care clinic since your last visit? Hospitalized since your last visit? No    2. Have you seen or consulted any other health care providers outside of the [address] [place] [address] since your last visit? Include any pap smears or colon screening.  No     Chief Complaint   Patient presents with    Weight Loss     Concerns

## 2018-07-19 ENCOUNTER — OFFICE VISIT (OUTPATIENT)
Dept: FAMILY MEDICINE CLINIC | Age: 53
End: 2018-07-19

## 2018-07-19 VITALS
TEMPERATURE: 98.4 F | BODY MASS INDEX: 41.82 KG/M2 | HEIGHT: 65 IN | OXYGEN SATURATION: 99 % | WEIGHT: 251 LBS | RESPIRATION RATE: 16 BRPM | SYSTOLIC BLOOD PRESSURE: 128 MMHG | DIASTOLIC BLOOD PRESSURE: 82 MMHG | HEART RATE: 75 BPM

## 2018-07-19 DIAGNOSIS — M79.641 PAIN IN BOTH HANDS: ICD-10-CM

## 2018-07-19 DIAGNOSIS — F32.A ANXIETY AND DEPRESSION: Primary | ICD-10-CM

## 2018-07-19 DIAGNOSIS — M79.642 PAIN IN BOTH HANDS: ICD-10-CM

## 2018-07-19 DIAGNOSIS — F41.9 ANXIETY AND DEPRESSION: Primary | ICD-10-CM

## 2018-07-19 PROBLEM — E66.01 OBESITY, MORBID (HCC): Status: ACTIVE | Noted: 2018-07-19

## 2018-07-19 RX ORDER — ESCITALOPRAM OXALATE 10 MG/1
TABLET ORAL
Qty: 90 TAB | Refills: 2 | Status: SHIPPED | OUTPATIENT
Start: 2018-07-19 | End: 2019-05-12 | Stop reason: SDUPTHER

## 2018-07-19 RX ORDER — DICLOFENAC SODIUM 75 MG/1
75 TABLET, DELAYED RELEASE ORAL
Qty: 180 TAB | Refills: 0 | Status: SHIPPED | OUTPATIENT
Start: 2018-07-19 | End: 2021-01-28

## 2018-07-19 RX ORDER — BUPROPION HYDROCHLORIDE 300 MG/1
300 TABLET ORAL
Qty: 90 TAB | Refills: 2 | Status: SHIPPED | OUTPATIENT
Start: 2018-07-19 | End: 2019-02-14

## 2018-07-19 NOTE — PROGRESS NOTES
Chief Complaint   Patient presents with    Medication Refill     Lexapro and Wellbutrin     she is a 46y.o. year old female who presents for evalution. Doing well on Saxenda - has lost 14 lbs since starting! No longer as focused on eating throughout the day and obsessive thoughts about food have decreased. Also here for refills on Lexapro and Wellbutrin. Feels well on current dosage. Pt states needs refill on Diclofenac for intermittent pain in hands. Reviewed PmHx, RxHx, FmHx, SocHx, AllgHx and updated and dated in the chart. Review of Systems - negative except as listed above in the HPI    Objective:     Vitals:    07/19/18 1050   BP: 128/82   Pulse: 75   Resp: 16   Temp: 98.4 °F (36.9 °C)   TempSrc: Oral   SpO2: 99%   Weight: 251 lb (113.9 kg)   Height: 5' 5\" (1.651 m)     Physical Examination: General appearance - alert, well appearing, and in no distress  Mental status - alert, oriented to person, place, and time, normal mood, behavior, speech, dress, motor activity, and thought processes  Chest - clear to auscultation, no wheezes, rales or rhonchi, symmetric air entry  Heart - normal rate, regular rhythm, normal S1, S2, no murmurs, rubs, clicks or gallops    Assessment/ Plan:   Diagnoses and all orders for this visit:    1. Anxiety and depression  -     buPROPion XL (WELLBUTRIN XL) 300 mg XL tablet; Take 1 Tab by mouth every morning.  -     escitalopram oxalate (LEXAPRO) 10 mg tablet; TAKE 1 TABLET BY MOUTH EVERY DAY  Stable, refills provided. F/U prn    2. Pain in both hands  -     diclofenac EC (VOLTAREN) 75 mg EC tablet; Take 1 Tab by mouth two (2) times daily (after meals). Stable, refills provided. Pt voiced understanding regarding plan of care. Follow-up Disposition:  Return if symptoms worsen or fail to improve. I have discussed the diagnosis with the patient and the intended plan as seen in the above orders.   The patient has received an after-visit summary and questions were answered concerning future plans.      Medication Side Effects and Warnings were discussed with patient    Rachel Cooley NP

## 2018-07-19 NOTE — MR AVS SNAPSHOT
[address] [id number] [phone] Patient: Carolene Gaucher MRN: [id number] Visit Information Date & Time Provider Department Dept. Phone Encounter #  
 2018 10:45 AM Viki Courtney NP [address] [facility] [phone] [id number] Your Appointments 2018  7:45 AM  
ESTABLISHED PATIENT with Viki Courtney NP [address] [facility] ([address]) Appt Note: 4mo fuv  
 [address] [address] [address] [phone]  
  
   
 [id number] [place] [address] [address] [address] Upcoming Health Maintenance Date Due Hepatitis C Screening 1965 BREAST CANCER SCRN MAMMOGRAM 7/3/2014 FOBT Q 1 YEAR AGE 50-75 7/3/2014 Influenza Age 5 to Adult 2018 PAP AKA CERVICAL CYTOLOGY 2020 DTaP/Tdap/Td series (2 - Td) 2027 Allergies as of 2018  Review Complete On: 2018 By: Ana Dinero LPN Severity Noted Reaction Type Reactions Levaquin [Levofloxacin]  2014    Rash Pcn [Penicillins]  2014    Rash Percocet [Oxycodone-acetaminophen]  10/16/2014    Nausea and Vomiting \"It upsets my stomach. \"  
  
Current Immunizations  Never Reviewed No immunizations on file. Not reviewed this visit You Were Diagnosed With   
  
 Codes Comments Anxiety and depression    -  Primary ICD-10-CM: F41.9, F32.9 ICD-9-CM: 300.00, 311 Pain in both hands     ICD-10-CM: M79.641, M96.164 ICD-9-CM: 729.5 Vitals BP Pulse Temp Resp Height(growth percentile) Weight(growth percentile) 128/82 75 98.4 °F (36.9 °C) (Oral) 16 5' 5\" (1.651 m) 251 lb (113.9 kg) SpO2 BMI OB Status Smoking Status 99% 41.77 kg/m2 Unknown Never Smoker Vitals History BMI and BSA Data Body Mass Index Body Surface Area 41.77 kg/m 2 2.29 m 2 Preferred Pharmacy Pharmacy Name Phone [id number]- [address], [address] [address] [facility] [address] [phone] Your Updated Medication List  
  
   
This list is accurate as of 7/19/18 11:11 AM.  Always use your most recent med list.  
  
  
  
  
 buPROPion  mg XL tablet Commonly known as:  Onesimoalondraflora Marino Take 1 Tab by mouth every morning. * diclofenac 1 % Gel Commonly known as:  VOLTAREN Apply  to affected area four (4) times daily. * diclofenac EC 75 mg EC tablet Commonly known as:  VOLTAREN Take 1 Tab by mouth two (2) times daily (after meals). escitalopram oxalate 10 mg tablet Commonly known as:  Janeann Ramp TAKE 1 TABLET BY MOUTH EVERY DAY Insulin Needles (Disposable) 32 gauge x 1/4\" Ndle Commonly known as:  NOVOFINE 32 For daily use, ok to substitute generic alternative- must work with saxenda  
  
 rosuvastatin 5 mg tablet Commonly known as:  CRESTOR Take 1 Tab by mouth nightly. SAXENDA 3 mg/0.5 mL (18 mg/3 mL) pen Generic drug:  liraglutide INJECT SUBCUTANEOUSLY AS DIRECTED- INCREASE BY 0.6MG PER WEEK UNTIL AT 3MG DOSE  
  
 * SUMAtriptan-naproxen  mg tablet Commonly known as:  TREXIMET Take one tab at headache onset, may repeat in 2 hours if needed. Max 2 tabs in 24 hours. 9 tab for 30 day supply. * SUMAtriptan-naproxen  mg tablet Commonly known as:  TREXIMET As directed. * topiramate ER 25 mg capsule Commonly known as:  TROKENDI XR Take 1 Cap by mouth daily. * topiramate ER 25 mg capsule Commonly known as:  TROKENDI XR As directed  
  
 traMADol 50 mg tablet Commonly known as:  ULTRAM  
1-2 po q 8 hours prn pain TYLENOL 325 mg tablet Generic drug:  acetaminophen Take 1,000 mg by mouth every four (4) hours as needed for Pain. VITAMIN D3 1,000 unit Cap Generic drug:  cholecalciferol Take 2,000 Units by mouth daily (after breakfast). * Notice:   This list has 6 medication(s) that are the same as other medications prescribed for you. Read the directions carefully, and ask your doctor or other care provider to review them with you. Prescriptions Sent to Pharmacy Refills buPROPion XL (WELLBUTRIN XL) 300 mg XL tablet 2 Sig: Take 1 Tab by mouth every morning. Class: Normal  
 Pharmacy: [facility]/pharmacy #[id number]- [address] Ph #: [phone] Route: Oral  
 escitalopram oxalate (LEXAPRO) 10 mg tablet 2 Sig: TAKE 1 TABLET BY MOUTH EVERY DAY Class: Normal  
 Pharmacy: [facility]/pharmacy #[id number] [facility], [address] [place] [address] Dr Ph #: [phone] diclofenac EC (VOLTAREN) 75 mg EC tablet 0 Sig: Take 1 Tab by mouth two (2) times daily (after meals). Class: Normal  
 Pharm[facility]: [id number] [address] Ph #: [phone] Route: Oral  
  
Introducing hospitals & [facility]! Dear Marcio Garcia: 
Thank you for requesting a [employer] account. Our records indicate that you already have an active [employer] account. You can access your account anytime at [email or web address] Did you know that you can access your hospital and ER discharge instructions at any time in [employer]? You can also review all of your test results from your hospital stay or ER visit. Additional Information If you have questions, please visit the Frequently Asked Questions section of the [employer] website at [email or web address] Remember, [employer] is NOT to be used for urgent needs. For medical emergencies, dial 911. Now available from your iPhone and Android! Please provide this summary of care documentation to your next provider. Your primary care clinician is listed as Maritza Ruano. If you have any questions after today's visit, please call [phone].

## 2018-07-19 NOTE — PROGRESS NOTES
1. Have you been to the ER, urgent care clinic since your last visit? Hospitalized since your last visit? No    2. Have you seen or consulted any other health care providers outside of the [facility] since your last visit? Include any pap smears or colon screening.  No   Chief Complaint   Patient presents with    Medication Refill     Lexapro and Wellbutrin

## 2018-07-22 DIAGNOSIS — E66.01 MORBID OBESITY WITH BMI OF 40.0-44.9, ADULT (HCC): ICD-10-CM

## 2018-07-23 RX ORDER — LIRAGLUTIDE 6 MG/ML
INJECTION, SOLUTION SUBCUTANEOUS
Qty: 15 EACH | Refills: 0 | Status: SHIPPED | OUTPATIENT
Start: 2018-07-23 | End: 2019-02-14

## 2018-10-10 ENCOUNTER — HOSPITAL ENCOUNTER (EMERGENCY)
Age: 53
Discharge: HOME | End: 2018-10-10
Attending: EMERGENCY MEDICINE
Payer: COMMERCIAL

## 2018-10-10 ENCOUNTER — APPOINTMENT (OUTPATIENT)
Dept: GENERAL RADIOLOGY | Age: 53
End: 2018-10-10
Attending: EMERGENCY MEDICINE
Payer: COMMERCIAL

## 2018-10-10 VITALS
WEIGHT: 250 LBS | HEART RATE: 79 BPM | DIASTOLIC BLOOD PRESSURE: 75 MMHG | RESPIRATION RATE: 15 BRPM | HEIGHT: 66 IN | BODY MASS INDEX: 40.18 KG/M2 | OXYGEN SATURATION: 96 % | TEMPERATURE: 98.7 F | SYSTOLIC BLOOD PRESSURE: 128 MMHG

## 2018-10-10 DIAGNOSIS — R07.9 CHEST PAIN, UNSPECIFIED TYPE: Primary | ICD-10-CM

## 2018-10-10 LAB
ALBUMIN SERPL-MCNC: 3.5 G/DL (ref 3.5–5)
ALBUMIN/GLOB SERPL: 0.9 {RATIO} (ref 1.1–2.2)
ALP SERPL-CCNC: 67 U/L (ref 45–117)
ALT SERPL-CCNC: 20 U/L (ref 12–78)
ANION GAP SERPL CALC-SCNC: 7 MMOL/L (ref 5–15)
AST SERPL-CCNC: 20 U/L (ref 15–37)
BASOPHILS # BLD: 0 K/UL (ref 0–0.1)
BASOPHILS NFR BLD: 1 % (ref 0–1)
BILIRUB SERPL-MCNC: 0.2 MG/DL (ref 0.2–1)
BNP SERPL-MCNC: 20 PG/ML (ref 0–125)
BUN SERPL-MCNC: 15 MG/DL (ref 6–20)
BUN/CREAT SERPL: 18 (ref 12–20)
CALCIUM SERPL-MCNC: 8.9 MG/DL (ref 8.5–10.1)
CHLORIDE SERPL-SCNC: 104 MMOL/L (ref 97–108)
CO2 SERPL-SCNC: 28 MMOL/L (ref 21–32)
COMMENT, HOLDF: NORMAL
CREAT SERPL-MCNC: 0.84 MG/DL (ref 0.55–1.02)
D DIMER PPP FEU-MCNC: <0.19 MG/L FEU (ref 0–0.65)
DIFFERENTIAL METHOD BLD: ABNORMAL
EOSINOPHIL # BLD: 0.5 K/UL (ref 0–0.4)
EOSINOPHIL NFR BLD: 6 % (ref 0–7)
ERYTHROCYTE [DISTWIDTH] IN BLOOD BY AUTOMATED COUNT: 13.3 % (ref 11.5–14.5)
GLOBULIN SER CALC-MCNC: 3.9 G/DL (ref 2–4)
GLUCOSE SERPL-MCNC: 114 MG/DL (ref 65–100)
HCT VFR BLD AUTO: 39.5 % (ref 35–47)
HGB BLD-MCNC: 13.7 G/DL (ref 11.5–16)
INR PPP: 1 (ref 0.9–1.1)
LIPASE SERPL-CCNC: 147 U/L (ref 73–393)
LYMPHOCYTES # BLD: 2.1 K/UL (ref 0.8–3.5)
LYMPHOCYTES NFR BLD: 26 % (ref 12–49)
MAGNESIUM SERPL-MCNC: 2 MG/DL (ref 1.6–2.4)
MCH RBC QN AUTO: 31.1 PG (ref 26–34)
MCHC RBC AUTO-ENTMCNC: 34.7 G/DL (ref 30–36.5)
MCV RBC AUTO: 89.6 FL (ref 80–99)
MONOCYTES # BLD: 0.7 K/UL (ref 0–1)
MONOCYTES NFR BLD: 8 % (ref 5–13)
NEUTS SEG # BLD: 4.9 K/UL (ref 1.8–8)
NEUTS SEG NFR BLD: 59 % (ref 32–75)
PLATELET # BLD AUTO: 267 K/UL (ref 150–400)
PMV BLD AUTO: 9 FL (ref 8.9–12.9)
POTASSIUM SERPL-SCNC: 4.1 MMOL/L (ref 3.5–5.1)
PROT SERPL-MCNC: 7.4 G/DL (ref 6.4–8.2)
PROTHROMBIN TIME: 9.3 SEC (ref 9–11.1)
RBC # BLD AUTO: 4.41 M/UL (ref 3.8–5.2)
SAMPLES BEING HELD,HOLD: NORMAL
SODIUM SERPL-SCNC: 139 MMOL/L (ref 136–145)
TROPONIN I SERPL-MCNC: <0.05 NG/ML
WBC # BLD AUTO: 8.1 K/UL (ref 3.6–11)
XXWBCSUS: 0

## 2018-10-10 PROCEDURE — 83880 ASSAY OF NATRIURETIC PEPTIDE: CPT | Performed by: EMERGENCY MEDICINE

## 2018-10-10 PROCEDURE — 80053 COMPREHEN METABOLIC PANEL: CPT | Performed by: EMERGENCY MEDICINE

## 2018-10-10 PROCEDURE — 74011000250 HC RX REV CODE- 250: Performed by: EMERGENCY MEDICINE

## 2018-10-10 PROCEDURE — 99285 EMERGENCY DEPT VISIT HI MDM: CPT

## 2018-10-10 PROCEDURE — 71046 X-RAY EXAM CHEST 2 VIEWS: CPT

## 2018-10-10 PROCEDURE — 83690 ASSAY OF LIPASE: CPT | Performed by: EMERGENCY MEDICINE

## 2018-10-10 PROCEDURE — 84484 ASSAY OF TROPONIN QUANT: CPT | Performed by: EMERGENCY MEDICINE

## 2018-10-10 PROCEDURE — 85610 PROTHROMBIN TIME: CPT | Performed by: EMERGENCY MEDICINE

## 2018-10-10 PROCEDURE — 93005 ELECTROCARDIOGRAM TRACING: CPT

## 2018-10-10 PROCEDURE — 85379 FIBRIN DEGRADATION QUANT: CPT | Performed by: EMERGENCY MEDICINE

## 2018-10-10 PROCEDURE — 74011250637 HC RX REV CODE- 250/637: Performed by: EMERGENCY MEDICINE

## 2018-10-10 PROCEDURE — 83735 ASSAY OF MAGNESIUM: CPT | Performed by: EMERGENCY MEDICINE

## 2018-10-10 PROCEDURE — 94762 N-INVAS EAR/PLS OXIMTRY CONT: CPT

## 2018-10-10 PROCEDURE — 85025 COMPLETE CBC W/AUTO DIFF WBC: CPT | Performed by: EMERGENCY MEDICINE

## 2018-10-10 PROCEDURE — 36415 COLL VENOUS BLD VENIPUNCTURE: CPT | Performed by: EMERGENCY MEDICINE

## 2018-10-10 RX ORDER — GUAIFENESIN 100 MG/5ML
325 LIQUID (ML) ORAL
Status: COMPLETED | OUTPATIENT
Start: 2018-10-10 | End: 2018-10-10

## 2018-10-10 RX ADMIN — LIDOCAINE HYDROCHLORIDE 40 ML: 20 SOLUTION ORAL; TOPICAL at 17:10

## 2018-10-10 RX ADMIN — ASPIRIN 81 MG 324 MG: 81 TABLET ORAL at 17:09

## 2018-10-10 NOTE — ED PROVIDER NOTES
HPI Comments: Started at 11/ has not taken anything for it/ did not eat lunch/ no prior/ worse w/ exertion, taking a deep breath/ denies assoc sx; pt denies HA, vison changes, diff swallowing,  SOB, Abd pain, F/Ch, N/V, D/Cons or other current systemic complaints Patient is a 46 y.o. female presenting with chest pain. The history is provided by the patient. Chest Pain (Angina) This is a new problem. The current episode started 6 to 12 hours ago. The problem has not changed since onset. The problem occurs rarely. The pain is associated with normal activity. The pain is present in the substernal region. The pain is mild. The quality of the pain is described as pressure-like, vice-like, tightness, heavy and pleuritic. The pain does not radiate. The symptoms are aggravated by movement, deep breathing and exertion. Associated symptoms include exertional chest pressure. Pertinent negatives include no abdominal pain, no back pain, no claudication, no cough, no diaphoresis, no dizziness, no fever, no headaches, no hemoptysis, no irregular heartbeat, no leg pain, no lower extremity edema, no malaise/fatigue, no nausea, no near-syncope, no numbness, no orthopnea, no palpitations, no PND, no shortness of breath, no sputum production, no vomiting and no weakness. She has tried nothing for the symptoms. The treatment provided no relief. Risk factors include a sendentary lifestyle, family history, dyslipidemia and obesity. Past medical history comments: M obesity, depression, HLP, . Past workup comments: denies prior evaluation. Past Medical History:  
Diagnosis Date  Depression   
 depression and anxiety  Headache  High cholesterol  Morbid obesity ([address] Past Surgical History:  
Procedure Laterality Date   Street UNLISTED   Incisional hernia repair  HX  SECTION    
 HX DILATION AND CURETTAGE    
 HX DILATION AND CURETTAGE    
 HX GYN  1998  
 [address] [facility]  
 exploratory lap  HX GYN    
 cur[place]loge  HX HEENT    
 wisdom teeth extraction  HX HERNIA REPAIR    
 HX KNEE ARTHROSCOPY    
 HX MENISCECTOMY  HX ORTHOPAEDIC  2013  
 tumor excision right 3rd finger  HX ORTHOPAEDIC  10/14  
 right knee arthroscopic meniscus trimming dr Anders Cano  HX PELVIC LAPAROSCOPY    
 HX TUMOR REMOVAL    
 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  
Problem Relation Age of Onset  Cancer Father   
  bladder and skin cancer  Elevated Lipids Father  Stroke Father   
  x 2  
 Dementia Mother Social History Social History  Marital status:  Spouse name: N/A  
 Number of children: N/A  
 Years of education: N/A Occupational History  Not on file. Social History Main Topics  Smoking status: Never Smoker  Smokeless tobacco: Never Used  Alcohol use Yes Comment: rare  Drug use: No  
 Sexual activity: Yes Other Topics Concern  Not on file Social History Narrative ALLERGIES: Levaquin [levofloxacin]; Pcn [penicillins]; and Percocet [oxycodone-acetaminophen] Review of Systems Constitutional: Positive for chills. Negative for diaphoresis, fever and malaise/fatigue. HENT: Negative for trouble swallowing and voice change. Eyes: Negative for visual disturbance. Respiratory: Positive for chest tightness. Negative for cough, hemoptysis, sputum production, shortness of breath, wheezing and stridor. Cardiovascular: Positive for chest pain. Negative for palpitations, orthopnea, claudication, PND and near-syncope. Gastrointestinal: Negative for abdominal pain, diarrhea, nausea and vomiting. Genitourinary: Negative for dysuria. Musculoskeletal: Negative for back pain. Skin: Negative for rash. Neurological: Negative for dizziness, weakness, numbness and headaches. All other systems reviewed and are negative. There were no vitals filed for this visit. Physical Exam  
Constitutional: She is oriented to person, place, and time. She appears well-developed and well-nourished. NAD, AxOx4, speaking in complete sentences HENT:  
Head: Normocephalic and atraumatic. Nose: Nose normal.  
Mouth/Throat: Oropharynx is clear and moist.  
Eyes: Conjunctivae and EOM are normal. Pupils are equal, round, and reactive to light. Right eye exhibits no discharge. Left eye exhibits no discharge. No scleral icterus. Neck: Normal range of motion. Neck supple. No JVD present. No tracheal deviation present. Cardiovascular: Normal rate, regular rhythm, normal heart sounds and intact distal pulses. Exam reveals no gallop and no friction rub. No murmur heard. Pulmonary/Chest: Effort normal and breath sounds normal. No respiratory distress. She has no wheezes. She has no rales. She exhibits no tenderness. Abdominal: Soft. Bowel sounds are normal. There is no tenderness. There is no rebound and no guarding. nttp Genitourinary: No vaginal discharge found. Genitourinary Comments: Pt denies urinary/ vaginal complaints Musculoskeletal: Normal range of motion. She exhibits no edema or tenderness. Neurological: She is alert and oriented to person, place, and time. She has normal reflexes. No cranial nerve deficit. She exhibits normal muscle tone. Coordination normal.  
pt has motor/ CV/ Sensation grossly intact to all extremities, R = L in strength;  
Skin: Skin is warm and dry. No rash noted. No erythema. No pallor. Psychiatric: She has a normal mood and affect. Her behavior is normal. Thought content normal.  
Nursing note and vitals reviewed. [facility] 
 
 
ED Course Procedures No chief complaint on file. 4:49 PM 
The patients presenting problems have been discussed, and they are in agreement with the care plan formulated and outlined with them.   I have encouraged them to ask questions as they arise throughout their visit. MEDICATIONS GIVEN: 
Medications - No data to display LABS REVIEWED: 
Labs Reviewed SAMPLES BEING HELD  
TROPONIN I  
METABOLIC PANEL, COMPREHENSIVE  
CBC WITH AUTOMATED DIFF  
 
 
RADIOLOGY RESULTS: 
The following have been ordered and reviewed: 
_____________________________________________________________________ 
_____________________________________________________________________ EKG interpretation:  
Rhythm: normal sinus rhythm; and regular . Rate (approx.): 84; Axis: normal; P wave: normal; QRS interval: normal ; ST/T wave: normal; Negative acute significant segmental elevations/ unchanged compared to study dated 09/24/2014 PROCEDURES: 
 
 
 
CONSULTATIONS:  
 
 
PROGRESS NOTES: 
 
 
DIAGNOSIS: 
 
1. Chest pain, unspecified type PLAN: 
1-Chest Pain / neg ed evaluation - will have pt follow-up with Cardiology;  
 
 
ED COURSE: The patients hospital course has been uncomplicated. 6:14 PM 
Kelly Ruvalcaba's  results have been reviewed with her. She has been counseled regarding her diagnosis. She verbally conveys understanding and agreement of the signs, symptoms, diagnosis, treatment and prognosis and additionally agrees to Call/ Arrange follow up as recommended with Dr. Luke Barker, RONNIE in 24 - 48 hours. She also agrees with the care-plan and conveys that all of her questions have been answered. I have also put together some discharge instructions for her that include: 1) educational information regarding their diagnosis, 2) how to care for their diagnosis at home, as well a 3) list of reasons why they would want to return to the ED prior to their follow-up appointment, should their condition change or for concerns.

## 2018-10-10 NOTE — ED NOTES
The patient was discharged home by Dr. Mahad Rios in stable condition. The patient is alert and oriented, in no respiratory distress and discharge vital signs obtained. The patient's diagnosis, condition and treatment were explained. The patient expressed understanding. No prescriptions given. No work/school note given. A discharge plan has been developed. A  was not involved in the process. Aftercare instructions were given. Pt ambulatory out of the ED.

## 2018-10-10 NOTE — ED NOTES
Patient medicated with ordered PO aspirin and GI Cocktail. Patient tolerated well. Lights dimmed for comfort.

## 2018-10-10 NOTE — ED NOTES
Patient resting on stretcher in no distress. Call bell remains in reach. Vital signs updated. Will continue to monitor.

## 2018-10-10 NOTE — ED NOTES
IV access established and blood samples obtained and sent to lab for ordered testing. Patient tolerated well. Patient updated regarding plan of care and associated time constraints. Patient verbalizes understanding and agreement. Call bell in reach. Will continue to monitor.

## 2018-10-10 NOTE — DISCHARGE INSTRUCTIONS
Chest Pain: Care Instructions  Your Care Instructions    There are many things that can cause chest pain. Some are not serious and will get better on their own in a few days. But some kinds of chest pain need more testing and treatment. Your doctor may have recommended a follow-up visit in the next 8 to 12 hours. If you are not getting better, you may need more tests or treatment. Even though your doctor has released you, you still need to watch for any problems. The doctor carefully checked you, but sometimes problems can develop later. If you have new symptoms or if your symptoms do not get better, get medical care right away. If you have worse or different chest pain or pressure that lasts more than 5 minutes or you passed out (lost consciousness), call 911 or seek other emergency help right away. A medical visit is only one step in your treatment. Even if you feel better, you still need to do what your doctor recommends, such as going to all suggested follow-up appointments and taking medicines exactly as directed. This will help you recover and help prevent future problems. How can you care for yourself at home? · Rest until you feel better. · Take your medicine exactly as prescribed. Call your doctor if you think you are having a problem with your medicine. · Do not drive after taking a prescription pain medicine. When should you call for help? Call 911 if:    · You passed out (lost consciousness).     · You have severe difficulty breathing.     · You have symptoms of a heart attack. These may include:  ¨ Chest pain or pressure, or a strange feeling in your chest.  ¨ Sweating. ¨ Shortness of breath. ¨ Nausea or vomiting. ¨ Pain, pressure, or a strange feeling in your back, neck, jaw, or upper belly or in one or both shoulders or arms. ¨ Lightheadedness or sudden weakness. ¨ A fast or irregular heartbeat.   After you call 911, the  may tell you to chew 1 adult-strength or 2 to 4 low-dose aspirin. Wait for an ambulance. Do not try to drive yourself.    Call your doctor today if:    · You have any trouble breathing.     · Your chest pain gets worse.     · You are dizzy or lightheaded, or you feel like you may faint.     · You are not getting better as expected.     · You are having new or different chest pain. Where can you learn more? Go to http://[employer]va-parish.info/. Enter A120 in the search box to learn more about \"Chest Pain: Care Instructions. \"  Current as of: November 20, 2017  Content Version: 11.8  © 2478-1814 [employer]. Care instructions adapted under license by [employer] (which disclaims liability or warranty for this information). If you have questions about a medical condition or this instruction, always ask your healthcare professional. [address] any warranty or liability for your use of this information.

## 2018-10-10 NOTE — ED TRIAGE NOTES
Patient presents to treatment area via wheelchair. Patient complains of left chest pain that radiates to her back that began at about 1200 today. Patient states she experienced some diaphoresis at onset. Patient states throat feels \"tight\" at times with the chest, but denies shortness of breath. .  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 Patient identifies no relieving or exacerbating factors for the pain.

## 2018-10-11 LAB
ATRIAL RATE: 85 BPM
CALCULATED P AXIS, ECG09: 4 DEGREES
CALCULATED R AXIS, ECG10: -16 DEGREES
CALCULATED T AXIS, ECG11: 17 DEGREES
DIAGNOSIS, 93000: NORMAL
P-R INTERVAL, ECG05: 136 MS
Q-T INTERVAL, ECG07: 402 MS
QRS DURATION, ECG06: 98 MS
QTC CALCULATION (BEZET), ECG08: 478 MS
VENTRICULAR RATE, ECG03: 85 BPM

## 2018-10-17 ENCOUNTER — TELEPHONE (OUTPATIENT)
Dept: CARDIOLOGY CLINIC | Age: 53
End: 2018-10-17

## 2018-12-28 RX ORDER — METHYLPREDNISOLONE 4 MG/1
TABLET ORAL
Qty: 1 DOSE PACK | Refills: 0 | Status: SHIPPED | OUTPATIENT
Start: 2018-12-28 | End: 2019-01-22

## 2018-12-28 NOTE — PROGRESS NOTES
I spoke with the patient today. Last Friday she took a fall striking the right side of the occiput. Now she has pain radiating forward as well as downward a bit. Focused exam finds tenderness in the right occipital notch. 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 Discussed the block but I think it would be better given this was secondary to trauma to try a course of steroid first.  Medrol Dosepak called to her local pharmacy. If this fails to give relief we will proceed with an occipital nerve block.     Dhiraj sEpaña MD

## 2019-01-22 RX ORDER — CEFDINIR 300 MG/1
300 CAPSULE ORAL 2 TIMES DAILY
Qty: 20 CAP | Refills: 0 | Status: SHIPPED | OUTPATIENT
Start: 2019-01-22 | End: 2019-02-14 | Stop reason: ALTCHOICE

## 2019-01-22 RX ORDER — METHYLPREDNISOLONE 4 MG/1
TABLET ORAL
Qty: 1 DOSE PACK | Refills: 0 | Status: SHIPPED | OUTPATIENT
Start: 2019-01-22 | End: 2019-02-14 | Stop reason: ALTCHOICE

## 2019-02-14 ENCOUNTER — OFFICE VISIT (OUTPATIENT)
Dept: FAMILY MEDICINE CLINIC | Age: 54
End: 2019-02-14

## 2019-02-14 VITALS
RESPIRATION RATE: 18 BRPM | BODY MASS INDEX: 41.88 KG/M2 | HEART RATE: 70 BPM | WEIGHT: 260.56 LBS | OXYGEN SATURATION: 98 % | SYSTOLIC BLOOD PRESSURE: 123 MMHG | HEIGHT: 66 IN | TEMPERATURE: 98.3 F | DIASTOLIC BLOOD PRESSURE: 84 MMHG

## 2019-02-14 DIAGNOSIS — E66.01 CLASS 3 SEVERE OBESITY DUE TO EXCESS CALORIES WITHOUT SERIOUS COMORBIDITY WITH BODY MASS INDEX (BMI) OF 40.0 TO 44.9 IN ADULT (HCC): Primary | ICD-10-CM

## 2019-02-14 NOTE — PROGRESS NOTES
Chief Complaint   Patient presents with    Medication Refill     wants to go back on [address]     she is a 46y.o. year old female who presents for evalution. Pt here to discuss starting back on Saxenda. Ended up having problems at the pharmacy and was unable to pick it up starting in Oct/Nov.  Pt states has been having increased stress at work and gaining weight, doing a lot of stress eating. No problems with side effects on medication previously. Pt having issues with mother with alzheimer's who is worsening. Marriage is falling apart, trying to have  move out. Just lots of stress recently, eating helps her feel better. Reviewed PmHx, RxHx, FmHx, SocHx, AllgHx and updated and dated in the chart. Review of Systems - negative except as listed above in the HPI    Objective:     Vitals:    02/14/19 0726   BP: 123/84   Pulse: 70   Resp: 18   Temp: 98.3 °F (36.8 °C)   TempSrc: Oral   SpO2: 98%   Weight: 260 lb 9 oz (118.2 kg)   Height: 5' 6\" (1.676 m)     Physical Examination: General appearance - alert, well appearing, and in no distress  Mental status - alert, oriented to person, place, and time, anxious  Chest - clear to auscultation, no wheezes, rales or rhonchi, symmetric air entry  Heart - normal rate, regular rhythm, normal S1, S2, no murmurs, rubs, clicks or gallops    Assessment/ Plan:   Diagnoses and all orders for this visit:    1. Class 3 severe obesity due to excess calories without serious comorbidity with body mass index (BMI) of 40.0 to 44.9 in adult (HCC)  -     naltrexone-buPROPion (CONTRAVE) 8-90 mg TbER ER tablet; Start at 2 tab po qam and 1 tab po qpm x 1 wk, then increase to 2 tabs po BID  New rx - able to start at higher dosage since already taking Wellbutrin. F/U 3 mo for weight recheck. Pt voiced understanding regarding plan of care. Follow-up Disposition:  Return in about 3 months (around 5/14/2019) for weight recheck .     I have discussed the diagnosis with the patient and the intended plan as seen in the above orders. The patient has received an after-visit summary and questions were answered concerning future plans.      Medication Side Effects and Warnings were discussed with patient    Mohinder Gonzalez NP

## 2019-02-14 NOTE — PATIENT INSTRUCTIONS
Starting a Weight Loss Plan: Care Instructions  Your Care Instructions    If you are thinking about losing weight, it can be hard to know where to start. Your doctor can help you set up a weight loss plan that best meets your needs. You may want to take a class on nutrition or exercise, or join a weight loss support group. If you have questions about how to make changes to your eating or exercise habits, ask your doctor about seeing a registered dietitian or an exercise specialist.  It can be a big challenge to lose weight. But you do not have to make huge changes at once. Make small changes, and stick with them. When those changes become habit, add a few more changes. If you do not think you are ready to make changes right now, try to pick a date in the future. Make an appointment to see your doctor to discuss whether the time is right for you to start a plan. Follow-up care is a key part of your treatment and safety. Be sure to make and go to all appointments, and call your doctor if you are having problems. It's also a good idea to know your test results and keep a list of the medicines you take. How can you care for yourself at home? · Set realistic goals. Many people expect to lose much more weight than is likely. A weight loss of 5% to 10% of your body weight may be enough to improve your health. · Get family and friends involved to provide support. Talk to them about why you are trying to lose weight, and ask them to help. They can help by participating in exercise and having meals with you, even if they may be eating something different. · Find what works best for you. If you do not have time or do not like to cook, a program that offers meal replacement bars or shakes may be better for you. Or if you like to prepare meals, finding a plan that includes daily menus and recipes may be best.  · Ask your doctor about other health professionals who can help you achieve your weight loss goals. ?  A dietitian can help you make healthy changes in your diet. ? An exercise specialist or  can help you develop a safe and effective exercise program.  ? A counselor or psychiatrist can help you cope with issues such as depression, anxiety, or family problems that can make it hard to focus on weight loss. · Consider joining a support group for people who are trying to lose weight. Your doctor can suggest groups in your area. Where can you learn more? Go to http://neva-parish.info/. Enter B179 in the search box to learn more about \"Starting a Weight Loss Plan: Care Instructions. \"  Current as of: June 25, 2018  Content Version: 11.9  © 1824-7612 [employer]. Care instructions adapted under license by [employer] (which disclaims liability or warranty for this information). If you have questions about a medical condition or this instruction, always ask your healthcare professional. [address] any warranty or liability for your use of this information.

## 2019-02-14 NOTE — PROGRESS NOTES
1. Have you been to the ER, urgent care clinic since your last visit? Hospitalized since your last visit? No    2. Have you seen or consulted any other health care providers outside of the [address] [place] [address] since your last visit? Include any pap smears or colon screening.  No   Chief Complaint   Patient presents with    Medication Refill     wants to go back on [facility]

## 2019-04-01 ENCOUNTER — PATIENT MESSAGE (OUTPATIENT)
Dept: FAMILY MEDICINE CLINIC | Age: 54
End: 2019-04-01

## 2019-04-01 RX ORDER — BUPROPION HYDROCHLORIDE 300 MG/1
300 TABLET ORAL
Qty: 90 TAB | Refills: 1 | Status: SHIPPED | OUTPATIENT
Start: 2019-04-01 | End: 2020-01-16 | Stop reason: SDUPTHER

## 2019-04-01 NOTE — TELEPHONE ENCOUNTER
From: Marge Cano  To: Meryl Booth NP  Sent: 4/1/2019 1:34 PM EDT  Subject: Non-Urgent Medical Question    Good Kevin Dumont,  I have given the Contrave a try and it's just not a good med for me. First of all, it has not impacted my appetite or cravings at all. .in fact they are somewhat increased. In addition, I have suffered from extreme constipation and nausea, incredible hot flashes and fatigue. If it was working, I'd happily endure the side effects. ..but, it's not. My anxiety and stress has increased a thousand fold in the past 3 weeks as my mother with dementia has been in the hospital with a fractured hip, followed by a GI bleed and is now in rehab. Needless to say my emotional eating is kicking my butt. I wonder if it is possible to re-order the [address] and get a PA for it, and maybe go back on Wellbutrin or something like it.     Kelly

## 2019-04-05 ENCOUNTER — TELEPHONE (OUTPATIENT)
Dept: FAMILY MEDICINE CLINIC | Age: 54
End: 2019-04-05

## 2019-04-05 NOTE — TELEPHONE ENCOUNTER
----- Message from Cooper Hernandez sent at 4/5/2019  3:55 PM EDT -----  Regarding: NP,Bell/Telephone   Pt is requesting a call back regarding [facility] is waiting for preauthorization for Rx Saxenda. Please leave a message. Best contact is [phone].

## 2019-04-05 NOTE — TELEPHONE ENCOUNTER
Please inform pt PA nurse is not here on Fridays and we have been short staffed. Will send message to PA nurse for next week.    Thanks,  N

## 2019-04-15 ENCOUNTER — APPOINTMENT (OUTPATIENT)
Dept: GENERAL RADIOLOGY | Age: 54
End: 2019-04-15
Attending: EMERGENCY MEDICINE
Payer: COMMERCIAL

## 2019-04-15 ENCOUNTER — HOSPITAL ENCOUNTER (EMERGENCY)
Age: 54
Discharge: HOME | End: 2019-04-15
Attending: EMERGENCY MEDICINE
Payer: COMMERCIAL

## 2019-04-15 VITALS
HEART RATE: 77 BPM | SYSTOLIC BLOOD PRESSURE: 120 MMHG | RESPIRATION RATE: 16 BRPM | OXYGEN SATURATION: 96 % | TEMPERATURE: 98.6 F | DIASTOLIC BLOOD PRESSURE: 69 MMHG

## 2019-04-15 DIAGNOSIS — M17.11 OSTEOARTHRITIS OF RIGHT KNEE, UNSPECIFIED OSTEOARTHRITIS TYPE: ICD-10-CM
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
## 2019-04-15 DIAGNOSIS — S09.90XA INJURY OF HEAD, INITIAL ENCOUNTER: ICD-10-CM

## 2019-04-15 DIAGNOSIS — M25.561 ACUTE PAIN OF RIGHT KNEE: Primary | ICD-10-CM

## 2019-04-15 PROCEDURE — 73562 X-RAY EXAM OF KNEE 3: CPT

## 2019-04-15 PROCEDURE — 99283 EMERGENCY DEPT VISIT LOW MDM: CPT

## 2019-04-15 PROCEDURE — L1830 KO IMMOB CANVAS LONG PRE OTS: HCPCS

## 2019-04-15 PROCEDURE — 74011250637 HC RX REV CODE- 250/637: Performed by: EMERGENCY MEDICINE

## 2019-04-15 RX ORDER — KETOROLAC TROMETHAMINE 10 MG/1
10 TABLET, FILM COATED ORAL
Qty: 20 TAB | Refills: 0 | Status: SHIPPED | OUTPATIENT
Start: 2019-04-15 | End: 2019-04-16

## 2019-04-15 RX ORDER — KETOROLAC TROMETHAMINE 10 MG/1
10 TABLET, FILM COATED ORAL ONCE
Status: COMPLETED | OUTPATIENT
Start: 2019-04-15 | End: 2019-04-15

## 2019-04-15 RX ADMIN — KETOROLAC TROMETHAMINE 10 MG: 10 TABLET, FILM COATED ORAL at 10:40

## 2019-04-15 NOTE — ED PROVIDER NOTES
HPI  
Pt is a 46 y.o. F here for knee pain and head injury after fall this AM.  She says her right knee gave out and she fell onto her right knee and then hit her left forehead on the door. She is having a headache. No LOC. No N/V or change in vision. No paresthesias. No dizziness or lightheaded feeling. No other complaints at this time. Past Medical History:  
Diagnosis Date  Depression   
 depression and anxiety  Headache  High cholesterol  Morbid obesity ([address] Past Surgical History:  
Procedure Laterality Date   [place] UNLISTED   Incisional hernia repair  HX  SECTION    
 HX DILATION AND CURETTAGE    
 HX DILATION AND CURETTAGE    
 HX GYN  1998  
 c section [address]  
 exploratory lap  HX GYN    
 curacloge  HX HEENT    
 wisdom teeth extraction  HX HERNIA REPAIR    
 HX KNEE ARTHROSCOPY    
 HX MENISCECTOMY  HX ORTHOPAEDIC  2013  
 tumor excision right 3rd finger  HX ORTHOPAEDIC  10/14  
 right knee arthroscopic meniscus trimming dr Catie Valverde  HX PELVIC LAPAROSCOPY    
 HX TUMOR REMOVAL    
 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  
Problem Relation Age of Onset  Cancer Father   
     bladder and skin cancer  Elevated Lipids Father  Stroke Father   
     x 2  
 Dementia Mother Social History Socioeconomic History  Marital status:  Spouse name: Not on file  Number of children: Not on file  Years of education: Not on file  Highest education level: Not on file Occupational History  Not on file Social Needs  Financial resource strain: Not on file  Food insecurity:  
  Worry: Not on file Inability: Not on file  Transportation needs:  
  Medical: Not on file Non-medical: Not on file Tobacco Use  Smoking status: Never Smoker  Smokeless tobacco: Never Used Substance and Sexual Activity  Alcohol use: No  
  Comment: rare  Drug use: No  
 Sexual activity: Yes Lifestyle  Physical activity:  
  Days per week: Not on file Minutes per session: Not on file  Stress: Not on file Relationships  Social connections:  
  Talks on phone: Not on file Gets together: Not on file Attends Sikhism service: Not on file Active member of club or organization: Not on file Attends meetings of clubs or organizations: Not on file Relationship status: Not on file  Intimate partner violence:  
  Fear of current or ex partner: Not on file Emotionally abused: Not on file Physically abused: Not on file Forced sexual activity: Not on file Other Topics Concern  Not on file Social History Narrative  Not on file ALLERGIES: Levaquin [levofloxacin]; Pcn [penicillins]; and Percocet [oxycodone-acetaminophen] Review of Systems Eyes: Negative for visual disturbance. Gastrointestinal: Negative for nausea and vomiting. Musculoskeletal: Positive for arthralgias and back pain. Neurological: Positive for headaches. Negative for dizziness, syncope, weakness, light-headedness and numbness. Vitals:  
 04/15/19 1013 BP: 123/75 Pulse: 83 Resp: 16 Temp: 98.6 °F (37 °C) SpO2: 98% Physical Exam  
Constitutional: She is oriented to person, place, and time. She appears well-developed and well-nourished. No distress. HENT:  
Head: Normocephalic. Mouth/Throat: Oropharynx is clear and moist.  
Eyes: Pupils are equal, round, and reactive to light. Conjunctivae and EOM are normal.  
Neck: Normal range of motion. Neck supple. No JVD present. Cardiovascular: Normal rate, regular rhythm, normal heart sounds and intact distal pulses. Pulmonary/Chest: Effort normal and breath sounds normal.  
Abdominal: Soft. Bowel sounds are normal. She exhibits no distension. There is no tenderness. Musculoskeletal: She exhibits tenderness. She exhibits no edema or deformity. Right knee: She exhibits decreased range of motion. She exhibits no deformity. Tenderness found. Lumbar back: She exhibits tenderness. Lymphadenopathy:  
  She has no cervical adenopathy. Neurological: She is alert and oriented to person, place, and time. No cranial nerve deficit or sensory deficit. Skin: Skin is warm and dry. Capillary refill takes less than 2 seconds. No rash noted. She is not diaphoretic. There is erythema (left forhead). Psychiatric: She has a normal mood and affect. Nursing note and vitals reviewed. MDM Procedures Pt with minor fall. Xray shows trace effusion with DJD. No LOC and no focal deficits on exam or associated sx with head injury and she is not on anticoagulants. Thus no CT head ordered. Pt advised of Xray results and knee immobilizer given. Pt advised to follow up with ortho and PCP and return to ED if sx worsen, change, progress, or new symptoms develop.  
 
Jamil Andre MD

## 2019-04-15 NOTE — ED TRIAGE NOTES
Pt rpts walking and her right knee \"gave away\" and she fell hitting her forehead on the doorframe. Denies LOC.

## 2019-04-15 NOTE — LETTER
[address] [facility] EMERGENCY DEPT 
[address] [place] [id number] 
[phone] Work/School Note Date: 4/15/2019 To Whom It May concern: 
 
Kellie Wakefield was seen and treated today. Kellie Wakefield may return to work on 04/16/19.  
 
Sincerely, 
 
 
 
 
Mecca Lynne MD

## 2019-04-15 NOTE — DISCHARGE INSTRUCTIONS
Patient Education        Joint Pain: Care Instructions  Your Care Instructions    Many people have small aches and pains from overuse or injury to muscles and joints. Joint injuries often happen during sports or recreation, work tasks, or projects around the home. An overuse injury can happen when you put too much stress on a joint or when you do an activity that stresses the joint over and over, such as using the computer or rowing a boat. You can take action at home to help your muscles and joints get better. You should feel better in 1 to 2 weeks, but it can take 3 months or more to heal completely. Follow-up care is a key part of your treatment and safety. Be sure to make and go to all appointments, and call your doctor if you are having problems. It's also a good idea to know your test results and keep a list of the medicines you take. How can you care for yourself at home? · Do not put weight on the injured joint for at least a day or two. · For the first day or two after an injury, do not take hot showers or baths, and do not use hot packs. The heat could make swelling worse. · Put ice or a cold pack on the sore joint for 10 to 20 minutes at a time. Try to do this every 1 to 2 hours for the next 3 days (when you are awake) or until the swelling goes down. Put a thin cloth between the ice and your skin. · Wrap the injury in an elastic bandage. Do not wrap it too tightly because this can cause more swelling. · Prop up the sore joint on a pillow when you ice it or anytime you sit or lie down during the next 3 days. Try to keep it above the level of your heart. This will help reduce swelling. · Take an over-the-counter pain medicine, such as acetaminophen (Tylenol), ibuprofen (Advil, Motrin), or naproxen (Aleve). Read and follow all instructions on the label. · After 1 or 2 days of rest, begin moving the joint gently.  While the joint is still healing, you can begin to exercise using activities that do not strain or hurt the painful joint. When should you call for help? Call your doctor now or seek immediate medical care if:    · You have signs of infection, such as:  ? Increased pain, swelling, warmth, and redness. ? Red streaks leading from the joint. ? A fever.    Watch closely for changes in your health, and be sure to contact your doctor if:    · Your movement or symptoms are not getting better after 1 to 2 weeks of home treatment. Where can you learn more? Go to http://neva-parish.info/. Enter P205 in the search box to learn more about \"Joint Pain: Care Instructions. \"  Current as of: September 20, 2018  Content Version: 11.9  © 8379-8863 [employer]. Care instructions adapted under license by [employer] (which disclaims liability or warranty for this information). If you have questions about a medical condition or this instruction, always ask your healthcare professional. [place] [address] any warranty or liability for your use of this information. Patient Education        Preventing Falls: Care Instructions  Your Care Instructions    Getting around your home safely can be a challenge if you have injuries or health problems that make it easy for you to fall. Loose rugs and furniture in walkways are among the dangers for many older people who have problems walking or who have poor eyesight. People who have conditions such as arthritis, osteoporosis, or dementia also have to be careful not to fall. You can make your home safer with a few simple measures. Follow-up care is a key part of your treatment and safety. Be sure to make and go to all appointments, and call your doctor if you are having problems. It's also a good idea to know your test results and keep a list of the medicines you take. How can you care for yourself at home? Taking care of yourself  · You may get dizzy if you do not drink enough water.  To prevent dehydration, drink plenty of fluids, enough so that your urine is light yellow or clear like water. Choose water and other caffeine-free clear liquids. If you have kidney, heart, or liver disease and have to limit fluids, talk with your doctor before you increase the amount of fluids you drink. · Exercise regularly to improve your strength, muscle tone, and balance. Walk if you can. Swimming may be a good choice if you cannot walk easily. · Have your vision and hearing checked each year or any time you notice a change. If you have trouble seeing and hearing, you might not be able to avoid objects and could lose your balance. · Know the side effects of the medicines you take. Ask your doctor or pharmacist whether the medicines you take can affect your balance. Sleeping pills or sedatives can affect your balance. · Limit the amount of alcohol you drink. Alcohol can impair your balance and other senses. · Ask your doctor whether calluses or corns on your feet need to be removed. If you wear loose-fitting shoes because of calluses or corns, you can lose your balance and fall. · Talk to your doctor if you have numbness in your feet. Preventing falls at home  · Remove raised doorway thresholds, throw rugs, and clutter. Repair loose carpet or raised areas in the floor. · Move furniture and electrical cords to keep them out of walking paths. · Use nonskid floor wax, and wipe up spills right away, especially on ceramic tile floors. · If you use a walker or cane, put rubber tips on it. If you use crutches, clean the bottoms of them regularly with an abrasive pad, such as steel wool. · Keep your house well lit, especially [employer], and outside walkways. Use night-lights in areas such as hallways and bathrooms. Add extra light switches or use remote switches (such as switches that go on or off when you clap your hands) to make it easier to turn lights on if you have to get up during the night.   · Install sturdy handrails on stairways. · Move items in your cabinets so that the things you use a lot are on the lower shelves (about waist level). · Keep a cordless phone and a flashlight with new batteries by your bed. If possible, put a phone in each of the main rooms of your house, or carry a cell phone in case you fall and cannot reach a phone. Or, you can wear a device around your neck or wrist. You push a button that sends a signal for help. · Wear low-heeled shoes that fit well and give your feet good support. Use footwear with nonskid soles. Check the heels and soles of your shoes for wear. Repair or replace worn heels or soles. · Do not wear socks without shoes on wood floors. · Walk on the grass when the sidewalks are slippery. If you live in an area that gets snow and ice in the winter, sprinkle salt on slippery steps and sidewalks. Preventing falls in the bath  · Install grab bars and nonskid mats inside and outside your shower or tub and near the toilet and sinks. · Use shower chairs and bath benches. · Use a hand-held shower head that will allow you to sit while showering. · Get into a tub or shower by putting the weaker leg in first. Get out of a tub or shower with your strong side first.  · Repair loose toilet seats and consider installing a raised toilet seat to make getting on and off the toilet easier. · Keep your bathroom door unlocked while you are in the shower. Where can you learn more? Go to http://neva-parish.info/. Enter 0476 79 69 71 in the search box to learn more about \"Preventing Falls: Care Instructions. \"  Current as of: March 15, 2018  Content Version: 11.9  © 5064-6338 [employer]. Care instructions adapted under license by [employer] (which disclaims liability or warranty for this information).  If you have questions about a medical condition or this instruction, always ask your healthcare professional. [address] any warranty or liability for your use of this information.

## 2019-04-15 NOTE — ED NOTES
Patient medicated with ordered PO toradol. Tolerated well. Patient updated regarding plan of care and associated time constraints. Patient verbalizes understanding and agreement. Call bell in reach. Bed in low, locked position. Will continue to monitor.

## 2019-04-16 ENCOUNTER — PATIENT OUTREACH (OUTPATIENT)
Dept: OTHER | Age: 54
End: 2019-04-16

## 2019-04-16 ENCOUNTER — OFFICE VISIT (OUTPATIENT)
Dept: NEUROLOGY | Age: 54
End: 2019-04-16

## 2019-04-16 VITALS
HEART RATE: 83 BPM | SYSTOLIC BLOOD PRESSURE: 132 MMHG | OXYGEN SATURATION: 97 % | HEIGHT: 66 IN | DIASTOLIC BLOOD PRESSURE: 84 MMHG | BODY MASS INDEX: 42.06 KG/M2

## 2019-04-16 DIAGNOSIS — S09.90XA CLOSED HEAD INJURY WITHOUT LOSS OF CONSCIOUSNESS, INITIAL ENCOUNTER: ICD-10-CM

## 2019-04-16 DIAGNOSIS — R40.0 DAYTIME SOMNOLENCE: ICD-10-CM

## 2019-04-16 DIAGNOSIS — R06.83 SNORING: ICD-10-CM

## 2019-04-16 DIAGNOSIS — R73.03 PREDIABETES: ICD-10-CM

## 2019-04-16 DIAGNOSIS — E78.5 DYSLIPIDEMIA: ICD-10-CM
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
## 2019-04-16 DIAGNOSIS — E66.01 OBESITY, MORBID (HCC): ICD-10-CM

## 2019-04-16 DIAGNOSIS — R53.1 ACUTE RIGHT-SIDED WEAKNESS: Primary | ICD-10-CM

## 2019-04-16 NOTE — PROGRESS NOTES
Date:  2019    Name:  Donna Stephen  :  1965  MRN:  [id number]       REQUESTING PHYSICIAN:  Pablo Gallegos NP    Chief Complaint   Patient presents with    TIA     HISTORY OF PRESENT ILLNESS: Ema Solis is a 46 y.o., right handed,  female with a significant history high cholesterol that is not treated, obesity, migraines occasionally. Mentioned intermittent dizziness for a few months. This occurs intermittently. She feels like she is moving especially when bending over. This will last a minute and occurs maybe a couple of times a month. Yesterday, her knee gave out and she fell striking the left side of her head, either the door frame or the wall around 10-11 am. She has had a headache since. She started to notice the right sided weakness around 11:30 today. States that she went to write out a requisition, she just could not make her hand/arm do what she needed. It has not worsened since the onset. She indicates that her right arm simply does not feel right. She does have right knee pain which is related to OA. She does not have a diagnosis of sleep apnea though she does snore, awakens without feeling refreshed, and does have persistent daytime fatigue. Her last recorded lipid profile was in  at which time she had a total cholesterol of 273, , HDL 56. At that time, she also had an HgbA1C of 6.2%. TSH was normal. As part of her work up for migraines in 2018, she did have an MRI which did demonstrate chronic changes likely secondary to ischemic vessel disease. Per her report, Dr. Cora Rivers had mentioned that she had had lacunar infarcts as well. Carotid dopplers were performed and this was normal as well. Current Outpatient Medications   Medication Sig    buPROPion XL (WELLBUTRIN XL) 300 mg XL tablet Take 1 Tab by mouth every morning.     escitalopram oxalate (LEXAPRO) 10 mg tablet TAKE 1 TABLET BY MOUTH EVERY DAY    diclofenac EC (VOLTAREN) 75 mg EC tablet Take 1 Tab by mouth two (2) times daily (after meals).  liraglutide (SAXENDA) 3 mg/0.5 mL (18 mg/3 mL) pen Start 0.6mg sq qd x 1 wk, then increase to 1.2mg qd x 1 week, then increase to 1.8mg qd x 1 week, then 2.4mg qd x 1 week, then 3mg sq qd    Insulin Needles, Disposable, (NOVOFINE 32) 32 gauge x 1/4\" ndle For daily use, ok to substitute generic alternative- must work with saxenda     No current facility-administered medications for this visit. Allergies   Allergen Reactions    Levaquin [Levofloxacin] Rash    Pcn [Penicillins] Rash    Percocet [Oxycodone-Acetaminophen] Nausea and Vomiting     \"It upsets my stomach. \"     Past Medical History:   Diagnosis Date    Depression     depression and anxiety    Headache     High cholesterol     Morbid obesity ([address]      Past Surgical History:   Procedure Laterality Date    ABDOMEN SURGERY PROC UNLISTED      Incisional hernia repair    HX  SECTION      HX DILATION AND CURETTAGE      HX DILATION AND CURETTAGE      HX GYN      c section    HX GYN      exploratory lap    HX GYN      curacloge    HX HEENT      wisdom teeth extraction    HX HERNIA REPAIR      HX KNEE ARTHROSCOPY      HX MENISCECTOMY      HX ORTHOPAEDIC  2013    tumor excision right 3rd finger    HX ORTHOPAEDIC  10/14    right knee arthroscopic meniscus trimming dr Enedelia Weber    HX PELVIC LAPAROSCOPY      HX TUMOR REMOVAL      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    HX WISDOM TEETH EXTRACTION       Social History     Socioeconomic History    Marital status:      Spouse name: Not on file    Number of children: Not on file    Years of education: Not on file    Highest education level: Not on file   Occupational History    Not on file   Social Needs    Financial resource strain: Not on file    Food insecurity:     Worry: Not on file     Inability: Not on file    Transportation needs:     Medical: Not on file     Non-medical: Not on file   Tobacco Use    Smoking status: Never Smoker    Smokeless tobacco: Never Used   Substance and Sexual Activity    Alcohol use: No     Comment: rare    Drug use: No    Sexual activity: Yes   Lifestyle    Physical activity:     Days per week: Not on file     Minutes per session: Not on file    Stress: Not on file   Relationships    Social connections:     Talks on phone: Not on file     Gets together: Not on file     Attends Denominational service: Not on file     Active member of club or organization: Not on file     Attends meetings of clubs or organizations: Not on file     Relationship status: Not on file    Intimate partner violence:     Fear of current or ex partner: Not on file     Emotionally abused: Not on file     Physically abused: Not on file     Forced sexual activity: Not on file   Other Topics Concern    Not on file   Social History Narrative    Not on file     Family History   Problem Relation Age of Onset    Cancer Father         bladder and skin cancer    Elevated Lipids Father     Stroke Father         x 2    Dementia Mother        PHYSICAL EXAMINATION:    Visit Vitals  /84   Pulse 83   Ht 5' 6\" (1.676 m)   SpO2 97%   BMI 42.06 kg/m²     General:  Well defined, nourished, and groomed individual in no acute distress. Neck: Supple, nontender, no bruits, no pain with resistance to active range of motion. Heart: Regular rate and rhythm, no murmurs, rub, or gallop. Normal S1S2. Lungs:  Clear to auscultation bilaterally with equal chest expansion, no cough, no wheeze  Musculoskeletal:  Extremities revealed no edema and had full range of motion of joints. Psych:  Good mood and bright affect    NEUROLOGICAL EXAMINATION:     Mental Status:   Alert and oriented to person, place, and time with recent and remote memory intact. Attention span and concentration are normal. Speech is fluent with a full fund of knowledge. Cranial Nerves:    II, III, IV, VI:  Visual acuity grossly intact.  Visual fields are normal. Pupils are equal, round, and reactive to light and accommodation. Extra-ocular movements are full and fluid. Fundoscopic exam was benign, no ptosis or nystagmus. V-XII: Hearing is grossly intact. Facial features are symmetric, with normal sensation and strength. The palate rises symmetrically and the tongue protrudes midline. Sternocleidomastoids 5/5. Motor Examination: Normal tone, bulk, and strength, in the left. There is mild right sided weakness 4/5 in all muscle groups. Coordination:  Finger to nose was normal.   No resting or intention tremor    Gait and Station:  Antalgic gait secondary to right knee pain. She is able to stand on heals and toes. Unable to walk on toes due to right knee pain. Normal arm swing. No pronator drift. No muscle wasting or fasiculations noted. Reflexes:  DTRs 2+ throughout. ASSESSMENT AND PLAN    ICD-10-CM ICD-9-CM    1. Acute right-sided weakness R53.1 728.87 CT HEAD WO CONT      TSH 3RD GENERATION      MRI BRAIN W WO CONT      MRA BRAIN W CONT      DUPLEX CAROTID BILATERAL   2. Obesity, morbid ([address] E66.01 278.01    3. Prediabetes R73.03 790.29 HEMOGLOBIN A1C WITH EAG      CBC WITH AUTOMATED DIFF      METABOLIC PANEL, COMPREHENSIVE   4. Dyslipidemia E78.5 272.4 LIPID PANEL      CBC WITH AUTOMATED DIFF      METABOLIC PANEL, COMPREHENSIVE   5. Fall from ground level W18.30XA E888.9 MRI BRAIN W WO CONT      MRA BRAIN W CONT   6. Closed head injury without loss of consciousness, initial encounter S09.90XA 959.01 MRI BRAIN W WO CONT      MRA BRAIN W CONT     Due to the potential new stroke like symptoms of right sided weakness and loss of function in the right hand with a closed head injury occurring yesterday, she will be sent for a STAT CT scan as this seems to be prudent.  If this is negative, then we will proceed with outpatient evaluation of possible ischemic events as well as stroke risk factors with MRI/MRA, carotid doppler, and labs to include lipids, HgbA1C, CBC, CMP, TSH. Given her fatigue as well as snoring, she will be set up for a sleep study to further evaluate for possible sleep apnea as this is a secondary stroke risk factor, as is migraine. Certainly, she has risk factors for stroke to include dyslipidemia, at least prediabetes if not diabetes at this point, obesity, physical inactivity, and Stage I HTN, as well as potentially previous silent lacunar infarcts. Many of her stroke risk factors are modifiable. I would recommend additional management of these issues with her PCP. It does appear that at some point, she was to start Crestor but never did and has had repeat labs ordered that she has not done. Follow up after testing. Niecy Dye, [facility]    Addendum: In reviewing the MRI images from 2016 with Dr. Lamar Miranda, there is evidence of a right basal ganglion infarct. This only adds to her stroke risk. With evidence of previous infarct, I would recommend that the LDL be less than 70 per secondary stroke guidelines. She should be started on high intensity statin therapy, Atorvastatin 40-80 mg or Rosuvastatin 20 or 40 mg.  I would further recommend that her blood pressure be maintained under 120/80 given the ischemic vessel disease as well as the presence of the lacunar infarct with an ACEI or ARB. If she has proceeded from prediabetes to a diabetic condition, she should be started on metformin 500mg bid with recheck of the A1C in three months. For secondary stroke prevention, she should be on ASA, at least 81mg daily.

## 2019-04-16 NOTE — PROGRESS NOTES
Patient on report as eligible for Case Management. Left discreet message on voicemail with this CM contact information. Will attempt to contact again to offer [address] Management services.

## 2019-04-17 ENCOUNTER — PATIENT OUTREACH (OUTPATIENT)
Dept: OTHER | Age: 54
End: 2019-04-17

## 2019-04-17 NOTE — PROGRESS NOTES
Patient identified as eligible for [address] [place] [address] services. Second telephone outreach attempted. Left discreet voicemail with this CM confidential contact information. Will send UTR letter.

## 2019-04-17 NOTE — LETTER
Ms. Carolene Gaucher [address] [place] [phone] Dear Ms. Akilah Heath, My name is Kadi Rivera RN, Employee Care Manager for [facility] and I have been trying to reach you. The Employee Care Management [facility]) program is a free-of-charge confidential service provided to our employees and their family members covered by the [facility]. The program will provide an employee and his/her family with the [facility] expertise to assist in navigating the health care delivery system, provider services, and their overall care needsso as to assure and improve health care interactions and enhance the quality of life. This program is designed to provide you with the opportunity to have a [facility] care manager partner with you for the following services: 
 
 1) when you come home from the hospital or emergency room 2) when help is needed to manage your disease 3) when you need assistance coordinating services or appointments [facility] now partners with [facility]. If you are a qualifying employee, you may receive an additional 10 wellness incentive points for every month of active participation with an Employee Care Manager. [facility] is dedicated to empowering the good health of its community and improving the quality of care and care experiences for employees and their families. We are committed to safeguarding patient confidentiality and privacy, assuring that every employee has the respect he or she deserves in managing their health. The information shared with your care manager will not be shared with anyone else aside from those you identify as part of your care team, and will only be used to assist you with any identified care needs. Please contact me if you would like this service provided to you. Sincerely, Kadi Rivera RN, BSN  Employee Care Manager [facility].  
 Summer Chiu [phone]  F [phone]  [email or web address]

## 2019-05-12 DIAGNOSIS — F32.A ANXIETY AND DEPRESSION: ICD-10-CM

## 2019-05-12 DIAGNOSIS — F41.9 ANXIETY AND DEPRESSION: ICD-10-CM

## 2019-05-13 RX ORDER — ESCITALOPRAM OXALATE 10 MG/1
TABLET ORAL
Qty: 90 TAB | Refills: 2 | Status: SHIPPED | OUTPATIENT
Start: 2019-05-13 | End: 2019-07-02

## 2019-07-01 PROBLEM — E78.49 OTHER HYPERLIPIDEMIA: Status: ACTIVE | Noted: 2019-07-01

## 2019-07-02 ENCOUNTER — OFFICE VISIT (OUTPATIENT)
Dept: INTERNAL MEDICINE CLINIC | Age: 54
End: 2019-07-02

## 2019-07-02 VITALS
SYSTOLIC BLOOD PRESSURE: 112 MMHG | TEMPERATURE: 98.7 F | HEART RATE: 84 BPM | OXYGEN SATURATION: 98 % | RESPIRATION RATE: 16 BRPM | DIASTOLIC BLOOD PRESSURE: 80 MMHG | WEIGHT: 276 LBS | BODY MASS INDEX: 44.36 KG/M2 | HEIGHT: 66 IN

## 2019-07-02 DIAGNOSIS — F41.9 ANXIETY AND DEPRESSION: ICD-10-CM

## 2019-07-02 DIAGNOSIS — F32.A ANXIETY AND DEPRESSION: ICD-10-CM

## 2019-07-02 DIAGNOSIS — R73.01 IFG (IMPAIRED FASTING GLUCOSE): ICD-10-CM

## 2019-07-02 DIAGNOSIS — Z76.89 ESTABLISHING CARE WITH NEW DOCTOR, ENCOUNTER FOR: Primary | ICD-10-CM

## 2019-07-02 DIAGNOSIS — E66.01 OBESITY, MORBID (HCC): ICD-10-CM

## 2019-07-02 DIAGNOSIS — E78.49 OTHER HYPERLIPIDEMIA: ICD-10-CM

## 2019-07-02 RX ORDER — SERTRALINE HYDROCHLORIDE 50 MG/1
50 TABLET, FILM COATED ORAL DAILY
Qty: 30 TAB | Refills: 3 | Status: SHIPPED | OUTPATIENT
Start: 2019-07-02 | End: 2019-09-13

## 2019-07-02 NOTE — PATIENT INSTRUCTIONS
Body Mass Index: Care Instructions Your Care Instructions Body mass index (BMI) can help you see if your weight is raising your risk for health problems. It uses a formula to compare how much you weigh with how tall you are. · A BMI lower than 18.5 is considered underweight. · A BMI between 18.5 and 24.9 is considered healthy. · A BMI between 25 and 29.9 is considered overweight. A BMI of 30 or higher is considered obese. If your BMI is in the normal range, it means that you have a lower risk for weight-related health problems. If your BMI is in the overweight or obese range, you may be at increased risk for weight-related health problems, such as high blood pressure, heart disease, stroke, arthritis or joint pain, and diabetes. If your BMI is in the underweight range, you may be at increased risk for health problems such as fatigue, lower protection (immunity) against illness, muscle loss, bone loss, hair loss, and hormone problems. BMI is just one measure of your risk for weight-related health problems. You may be at higher risk for health problems if you are not active, you eat an unhealthy diet, or you drink too much alcohol or use tobacco products. Follow-up care is a key part of your treatment and safety. Be sure to make and go to all appointments, and call your doctor if you are having problems. It's also a good idea to know your test results and keep a list of the medicines you take. How can you care for yourself at home? · Practice healthy eating habits. This includes eating plenty of fruits, vegetables, whole grains, lean protein, and low-fat dairy. · If your doctor recommends it, get more exercise. Walking is a good choice. Bit by bit, increase the amount you walk every day. Try for at least 30 minutes on most days of the week. · Do not smoke. Smoking can increase your risk for health problems.  If you need help quitting, talk to your doctor about stop-smoking programs and medicines. These can increase your chances of quitting for good. · Limit alcohol to 2 drinks a day for men and 1 drink a day for women. Too much alcohol can cause health problems. If you have a BMI higher than 25 · Your doctor may do other tests to check your risk for weight-related health problems. This may include measuring the distance around your waist. A waist measurement of more than 40 inches in men or 35 inches in women can increase the risk of weight-related health problems. · Talk with your doctor about steps you can take to stay healthy or improve your health. You may need to make lifestyle changes to lose weight and stay healthy, such as changing your diet and getting regular exercise. If you have a BMI lower than 18.5 · Your doctor may do other tests to check your risk for health problems. · Talk with your doctor about steps you can take to stay healthy or improve your health. You may need to make lifestyle changes to gain or maintain weight and stay healthy, such as getting more healthy foods in your diet and doing exercises to build muscle. Where can you learn more? Go to http://neva-parish.info/. Enter S176 in the search box to learn more about \"Body Mass Index: Care Instructions. \" Current as of: October 13, 2016 Content Version: 11.4 © 8564-3365 [employer]wise[employer]. Care instructions adapted under license by [employer] (which disclaims liability or warranty for this information). If you have questions about a medical condition or this instruction, always ask your healthcare professional. [address] any warranty or liability for your use of this information.
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
## 2019-07-04 LAB
ALBUMIN SERPL-MCNC: 4.1 G/DL (ref 3.5–5.5)
ALBUMIN/GLOB SERPL: 1.8 {RATIO} (ref 1.2–2.2)
ALP SERPL-CCNC: 67 IU/L (ref 39–117)
ALT SERPL-CCNC: 13 IU/L (ref 0–32)
AST SERPL-CCNC: 15 IU/L (ref 0–40)
BILIRUB SERPL-MCNC: 0.3 MG/DL (ref 0–1.2)
BUN SERPL-MCNC: 15 MG/DL (ref 6–24)
BUN/CREAT SERPL: 25 (ref 9–23)
CALCIUM SERPL-MCNC: 9.2 MG/DL (ref 8.7–10.2)
CHLORIDE SERPL-SCNC: 106 MMOL/L (ref 96–106)
CHOLEST SERPL-MCNC: 243 MG/DL (ref 100–199)
CO2 SERPL-SCNC: 23 MMOL/L (ref 20–29)
CREAT SERPL-MCNC: 0.61 MG/DL (ref 0.57–1)
EST. AVERAGE GLUCOSE BLD GHB EST-MCNC: 126 MG/DL
GLOBULIN SER CALC-MCNC: 2.3 G/DL (ref 1.5–4.5)
GLUCOSE SERPL-MCNC: 154 MG/DL (ref 65–99)
HBA1C MFR BLD: 6 % (ref 4.8–5.6)
HDLC SERPL-MCNC: 46 MG/DL
INTERPRETATION, 910389: NORMAL
LDLC SERPL CALC-MCNC: 143 MG/DL (ref 0–99)
POTASSIUM SERPL-SCNC: 4.8 MMOL/L (ref 3.5–5.2)
PROT SERPL-MCNC: 6.4 G/DL (ref 6–8.5)
SODIUM SERPL-SCNC: 142 MMOL/L (ref 134–144)
TRIGL SERPL-MCNC: 272 MG/DL (ref 0–149)
TSH SERPL DL<=0.005 MIU/L-ACNC: 1.6 UIU/ML (ref 0.45–4.5)
VLDLC SERPL CALC-MCNC: 54 MG/DL (ref 5–40)

## 2019-07-09 PROBLEM — E11.8 TYPE 2 DIABETES MELLITUS WITH COMPLICATION, WITHOUT LONG-TERM CURRENT USE OF INSULIN (HCC): Status: ACTIVE | Noted: 2019-07-09

## 2019-07-10 DIAGNOSIS — E78.5 HYPERLIPIDEMIA, UNSPECIFIED HYPERLIPIDEMIA TYPE: ICD-10-CM

## 2019-07-10 DIAGNOSIS — E11.8 TYPE 2 DIABETES MELLITUS WITH COMPLICATION, WITHOUT LONG-TERM CURRENT USE OF INSULIN (HCC): Primary | ICD-10-CM

## 2019-07-10 RX ORDER — ATORVASTATIN CALCIUM 10 MG/1
10 TABLET, FILM COATED ORAL DAILY
Qty: 90 TAB | Refills: 1 | Status: SHIPPED | OUTPATIENT
Start: 2019-07-10 | End: 2020-08-18

## 2019-07-10 RX ORDER — PEN NEEDLE, DIABETIC 30 GX3/16"
NEEDLE, DISPOSABLE MISCELLANEOUS
Qty: 1 PACKAGE | Refills: 11 | Status: SHIPPED | OUTPATIENT
Start: 2019-07-10 | End: 2020-08-18

## 2019-07-17 ENCOUNTER — TELEPHONE (OUTPATIENT)
Dept: INTERNAL MEDICINE CLINIC | Age: 54
End: 2019-07-17

## 2019-07-17 NOTE — TELEPHONE ENCOUNTER
[facility] [employer]         General Message/Vendor Calls     Caller's first and last name:   Mary Stein from [facility]     Reason for call:   Question about Prior Authorization     Callback required yes/no and why:   Yes, to update on status     Best contact number(s):   [phone]     Details to clarify the request:   Mary Stein is calling to check on the status of the prior authorization \"[employer]\".        Val Cruz

## 2019-07-23 ENCOUNTER — DOCUMENTATION ONLY (OUTPATIENT)
Dept: INTERNAL MEDICINE CLINIC | Age: 54
End: 2019-07-23

## 2019-07-23 NOTE — PROGRESS NOTES
PA for ozempic 0.25 or 0.5 has been approved from 7/19/19 to 7/18/20, for a maximum of 12 refills. Pt has been notified.

## 2019-08-19 ENCOUNTER — HOSPITAL ENCOUNTER (EMERGENCY)
Age: 54
Discharge: HOME | End: 2019-08-19
Attending: EMERGENCY MEDICINE
Payer: COMMERCIAL

## 2019-08-19 ENCOUNTER — APPOINTMENT (OUTPATIENT)
Dept: ULTRASOUND IMAGING | Age: 54
End: 2019-08-19
Attending: EMERGENCY MEDICINE
Payer: COMMERCIAL

## 2019-08-19 VITALS
RESPIRATION RATE: 16 BRPM | DIASTOLIC BLOOD PRESSURE: 58 MMHG | BODY MASS INDEX: 42.94 KG/M2 | WEIGHT: 257.72 LBS | OXYGEN SATURATION: 96 % | HEIGHT: 65 IN | HEART RATE: 77 BPM | SYSTOLIC BLOOD PRESSURE: 123 MMHG | TEMPERATURE: 98.4 F

## 2019-08-19 DIAGNOSIS — R10.13 ABDOMINAL PAIN, EPIGASTRIC: Primary | ICD-10-CM

## 2019-08-19 LAB
ALBUMIN SERPL-MCNC: 4 G/DL (ref 3.5–5)
ALBUMIN/GLOB SERPL: 1.1 {RATIO} (ref 1.1–2.2)
ALP SERPL-CCNC: 71 U/L (ref 45–117)
ALT SERPL-CCNC: 20 U/L (ref 12–78)
ANION GAP SERPL CALC-SCNC: 10 MMOL/L (ref 5–15)
APPEARANCE UR: CLEAR
AST SERPL-CCNC: 14 U/L (ref 15–37)
BACTERIA URNS QL MICRO: ABNORMAL /HPF
BASOPHILS # BLD: 0 K/UL (ref 0–0.1)
BASOPHILS NFR BLD: 0 % (ref 0–1)
BILIRUB DIRECT SERPL-MCNC: 0.1 MG/DL (ref 0–0.2)
BILIRUB SERPL-MCNC: 0.4 MG/DL (ref 0.2–1)
BILIRUB UR QL: NEGATIVE
BUN SERPL-MCNC: 19 MG/DL (ref 6–20)
BUN/CREAT SERPL: 23 (ref 12–20)
CALCIUM SERPL-MCNC: 9 MG/DL (ref 8.5–10.1)
CHLORIDE SERPL-SCNC: 105 MMOL/L (ref 97–108)
CO2 SERPL-SCNC: 25 MMOL/L (ref 21–32)
COLOR UR: ABNORMAL
CREAT SERPL-MCNC: 0.82 MG/DL (ref 0.55–1.02)
DIFFERENTIAL METHOD BLD: NORMAL
EOSINOPHIL # BLD: 0.2 K/UL (ref 0–0.4)
EOSINOPHIL NFR BLD: 3 % (ref 0–7)
EPITH CASTS URNS QL MICRO: ABNORMAL /LPF
ERYTHROCYTE [DISTWIDTH] IN BLOOD BY AUTOMATED COUNT: 13.3 % (ref 11.5–14.5)
GLOBULIN SER CALC-MCNC: 3.6 G/DL (ref 2–4)
GLUCOSE SERPL-MCNC: 121 MG/DL (ref 65–100)
GLUCOSE UR STRIP.AUTO-MCNC: NEGATIVE MG/DL
HCT VFR BLD AUTO: 41.4 % (ref 35–47)
HGB BLD-MCNC: 14.4 G/DL (ref 11.5–16)
HGB UR QL STRIP: ABNORMAL
KETONES UR QL STRIP.AUTO: NEGATIVE MG/DL
LEUKOCYTE ESTERASE UR QL STRIP.AUTO: ABNORMAL
LIPASE SERPL-CCNC: 91 U/L (ref 73–393)
LYMPHOCYTES # BLD: 1 K/UL (ref 0.8–3.5)
LYMPHOCYTES NFR BLD: 16 % (ref 12–49)
MCH RBC QN AUTO: 30.8 PG (ref 26–34)
MCHC RBC AUTO-ENTMCNC: 34.8 G/DL (ref 30–36.5)
MCV RBC AUTO: 88.7 FL (ref 80–99)
MONOCYTES # BLD: 0.5 K/UL (ref 0–1)
MONOCYTES NFR BLD: 8 % (ref 5–13)
MUCOUS THREADS URNS QL MICRO: ABNORMAL /LPF
NEUTS SEG # BLD: 4.4 K/UL (ref 1.8–8)
NEUTS SEG NFR BLD: 73 % (ref 32–75)
NITRITE UR QL STRIP.AUTO: NEGATIVE
PH UR STRIP: 6 [PH] (ref 5–8)
PLATELET # BLD AUTO: 263 K/UL (ref 150–400)
PMV BLD AUTO: 9 FL (ref 8.9–12.9)
POTASSIUM SERPL-SCNC: 4.3 MMOL/L (ref 3.5–5.1)
PROT SERPL-MCNC: 7.6 G/DL (ref 6.4–8.2)
PROT UR STRIP-MCNC: NEGATIVE MG/DL
RBC # BLD AUTO: 4.67 M/UL (ref 3.8–5.2)
RBC #/AREA URNS HPF: ABNORMAL /HPF (ref 0–5)
SODIUM SERPL-SCNC: 140 MMOL/L (ref 136–145)
SP GR UR REFRACTOMETRY: 1.02 (ref 1–1.03)
UR CULT HOLD, URHOLD: NORMAL
UROBILINOGEN UR QL STRIP.AUTO: 0.2 EU/DL (ref 0.2–1)
WBC # BLD AUTO: 6.1 K/UL (ref 3.6–11)
WBC URNS QL MICRO: ABNORMAL /HPF (ref 0–4)

## 2019-08-19 PROCEDURE — 99284 EMERGENCY DEPT VISIT MOD MDM: CPT

## 2019-08-19 PROCEDURE — 96376 TX/PRO/DX INJ SAME DRUG ADON: CPT

## 2019-08-19 PROCEDURE — 96374 THER/PROPH/DIAG INJ IV PUSH: CPT

## 2019-08-19 PROCEDURE — 96361 HYDRATE IV INFUSION ADD-ON: CPT

## 2019-08-19 PROCEDURE — 76705 ECHO EXAM OF ABDOMEN: CPT

## 2019-08-19 PROCEDURE — 36415 COLL VENOUS BLD VENIPUNCTURE: CPT

## 2019-08-19 PROCEDURE — 74011250637 HC RX REV CODE- 250/637: Performed by: EMERGENCY MEDICINE

## 2019-08-19 PROCEDURE — 85025 COMPLETE CBC W/AUTO DIFF WBC: CPT

## 2019-08-19 PROCEDURE — 74011250636 HC RX REV CODE- 250/636: Performed by: EMERGENCY MEDICINE

## 2019-08-19 PROCEDURE — 80048 BASIC METABOLIC PNL TOTAL CA: CPT

## 2019-08-19 PROCEDURE — 81001 URINALYSIS AUTO W/SCOPE: CPT

## 2019-08-19 PROCEDURE — 80076 HEPATIC FUNCTION PANEL: CPT

## 2019-08-19 PROCEDURE — 83690 ASSAY OF LIPASE: CPT

## 2019-08-19 RX ORDER — ONDANSETRON 4 MG/1
4 TABLET, ORALLY DISINTEGRATING ORAL
Qty: 15 TAB | Refills: 0 | Status: SHIPPED | OUTPATIENT
Start: 2019-08-19 | End: 2021-01-28

## 2019-08-19 RX ORDER — ONDANSETRON 2 MG/ML
4 INJECTION INTRAMUSCULAR; INTRAVENOUS
Status: COMPLETED | OUTPATIENT
Start: 2019-08-19 | End: 2019-08-19

## 2019-08-19 RX ORDER — FAMOTIDINE 20 MG/1
20 TABLET, FILM COATED ORAL
Status: COMPLETED | OUTPATIENT
Start: 2019-08-19 | End: 2019-08-19

## 2019-08-19 RX ORDER — FAMOTIDINE 20 MG/1
20 TABLET, FILM COATED ORAL 2 TIMES DAILY
Qty: 20 TAB | Refills: 0 | Status: SHIPPED | OUTPATIENT
Start: 2019-08-19 | End: 2019-08-29

## 2019-08-19 RX ADMIN — FAMOTIDINE 20 MG: 20 TABLET ORAL at 09:22

## 2019-08-19 RX ADMIN — ALUMINUM HYDROXIDE AND MAGNESIUM HYDROXIDE 15 ML: 200; 200 SUSPENSION ORAL at 09:40

## 2019-08-19 RX ADMIN — ONDANSETRON 4 MG: 2 INJECTION INTRAMUSCULAR; INTRAVENOUS at 09:21

## 2019-08-19 RX ADMIN — SODIUM CHLORIDE 1000 ML: 900 INJECTION, SOLUTION INTRAVENOUS at 08:30

## 2019-08-19 RX ADMIN — ONDANSETRON 4 MG: 2 INJECTION INTRAMUSCULAR; INTRAVENOUS at 08:30

## 2019-08-19 NOTE — DISCHARGE INSTRUCTIONS
Patient Education        Abdominal Pain: Care Instructions  Your Care Instructions    Abdominal pain has many possible causes. Some aren't serious and get better on their own in a few days. Others need more testing and treatment. If your pain continues or gets worse, you need to be rechecked and may need more tests to find out what is wrong. You may need surgery to correct the problem. Don't ignore new symptoms, such as fever, nausea and vomiting, urination problems, pain that gets worse, and dizziness. These may be signs of a more serious problem. Your doctor may have recommended a follow-up visit in the next 8 to 12 hours. If you are not getting better, you may need more tests or treatment. The doctor has checked you carefully, but problems can develop later. If you notice any problems or new symptoms, get medical treatment right away. Follow-up care is a key part of your treatment and safety. Be sure to make and go to all appointments, and call your doctor if you are having problems. It's also a good idea to know your test results and keep a list of the medicines you take. How can you care for yourself at home? · Rest until you feel better. · To prevent dehydration, drink plenty of fluids, enough so that your urine is light yellow or clear like water. Choose water and other caffeine-free clear liquids until you feel better. If you have kidney, heart, or liver disease and have to limit fluids, talk with your doctor before you increase the amount of fluids you drink. · If your stomach is upset, eat mild foods, such as rice, dry toast or crackers, bananas, and applesauce. Try eating several small meals instead of two or three large ones. · Wait until 48 hours after all symptoms have gone away before you have spicy foods, alcohol, and drinks that contain caffeine. · Do not eat foods that are high in fat. · Avoid anti-inflammatory medicines such as aspirin, ibuprofen (Advil, Motrin), and naproxen (Aleve). These can cause stomach upset. Talk to your doctor if you take daily aspirin for another health problem. When should you call for help? Call 911 anytime you think you may need emergency care. For example, call if:    · You passed out (lost consciousness).     · You pass maroon or very bloody stools.     · You vomit blood or what looks like coffee grounds.     · You have new, severe belly pain.    Call your doctor now or seek immediate medical care if:    · Your pain gets worse, especially if it becomes focused in one area of your belly.     · You have a new or higher fever.     · Your stools are black and look like tar, or they have streaks of blood.     · You have unexpected vaginal bleeding.     · You have symptoms of a urinary tract infection. These may include:  ? Pain when you urinate. ? Urinating more often than usual.  ? Blood in your urine.     · You are dizzy or lightheaded, or you feel like you may faint.    Watch closely for changes in your health, and be sure to contact your doctor if:    · You are not getting better after 1 day (24 hours). Where can you learn more? Go to http://[employer]ish.info/. Enter H765 in the search box to learn more about \"Abdominal Pain: Care Instructions. \"  Current as of: September 23, 2018  Content Version: 12.1  © 6712-3072 [employer]. Care instructions adapted under license by [employer] (which disclaims liability or warranty for this information). If you have questions about a medical condition or this instruction, always ask your healthcare professional. [place] [address] any warranty or liability for your use of this information. Patient Education        Indigestion (Dyspepsia or Heartburn): Care Instructions  Your Care Instructions  Sometimes it can be hard to pinpoint the cause of indigestion.  (It is also called dyspepsia or heartburn.) Most cases of an upset stomach with bloating, burning, burping, and nausea are minor and go away within several hours. Home treatment and over-the-counter medicine often are able to control symptoms. But if you take medicine to relieve your indigestion without making diet and lifestyle changes, your symptoms are likely to return again and again. If you get indigestion often, it may be a sign of a more serious medical problem. Be sure to follow up with your doctor, who may want to do tests to be sure of the cause of your indigestion. Follow-up care is a key part of your treatment and safety. Be sure to make and go to all appointments, and call your doctor if you are having problems. It's also a good idea to know your test results and keep a list of the medicines you take. How can you care for yourself at home? · Your doctor may recommend over-the-counter medicine. For mild or occasional indigestion, antacids such as Gaviscon, Mylanta, Maalox, or Tums, may help. Be safe with medicines. Be careful when you take over-the-counter antacid medicines. Many of these medicines have aspirin in them. Read the label to make sure that you are not taking more than the recommended dose. Too much aspirin can be harmful. · Your doctor also may recommend over-the-counter acid reducers, such as Pepcid AC, Tagamet HB, Zantac 75, or Prilosec. Read and follow all instructions on the label. If you use these medicines often, talk with your doctor. · Change your eating habits. ? It's best to eat several small meals instead of two or three large meals. ? After you eat, wait 2 to 3 hours before you lie down. ? Chocolate, mint, and alcohol can make GERD worse. ? Spicy foods, foods that have a lot of acid (like tomatoes and oranges), and coffee can make GERD symptoms worse in some people. If your symptoms are worse after you eat a certain food, you may want to stop eating that food to see if your symptoms get better. · Do not smoke or chew tobacco. Smoking can make GERD worse.  If you need help quitting, talk to your doctor about stop-smoking programs and medicines. These can increase your chances of quitting for good. · If you have GERD symptoms at night, raise the head of your bed 6 to 8 inches. You can do this by putting the frame on blocks or placing a foam wedge under the head of your mattress. (Adding extra pillows does not work.)  · Do not wear tight clothing around your middle. · Lose weight if you need to. Losing just 5 to 10 pounds can help. · Do not take anti-inflammatory medicines, such as aspirin, ibuprofen (Advil, Motrin), or naproxen (Aleve). These can irritate the stomach. If you need a pain medicine, try acetaminophen (Tylenol), which does not cause stomach upset. When should you call for help? Call your doctor now or seek immediate medical care if:    · You have new or worse belly pain.     · You are vomiting.    Watch closely for changes in your health, and be sure to contact your doctor if:    · You have new or worse symptoms of indigestion.     · You have trouble or pain swallowing.     · You are losing weight.     · You do not get better as expected. Where can you learn more? Go to http://[employer]va-parish.info/. Enter Z974 in the search box to learn more about \"Indigestion (Dyspepsia or Heartburn): Care Instructions. \"  Current as of: November 7, 2018  Content Version: 12.1  © 3078-5146 [employer]. Care instructions adapted under license by [employer] (which disclaims liability or warranty for this information). If you have questions about a medical condition or this instruction, always ask your healthcare professional. [address] any warranty or liability for your use of this information.

## 2019-08-19 NOTE — LETTER
[facility] EMERGENCY DEPT 
[address] [facility] [place] [id number] 
[phone] Work/School Note Date: 8/19/2019 To Whom It May concern: 
 
Keo Rasmussen was seen and treated today in the emergency room by the following provider(s): 
Attending Provider: Purnima Saxena MD. Kelly Simpson Else please excuse from work today 8/19/19 Sincerely, 
 
 
 
 
Felix Chou MD

## 2019-08-19 NOTE — ED NOTES
Pt reports no improvement in nausea symptoms. No vomiting since arrival.  Pt medicated with an additional dose of zofran. Pt tolerated PO pepcid without difficulty. Pt given additional blanket for comfort.

## 2019-08-19 NOTE — ED TRIAGE NOTES
Pt reports upper abdominal pain that started last night with 2 episodes of vomiting. Pt denies fevers, urinary symptoms and blood in stool or vomit. Pt reports \"loose stool. \"

## 2019-08-19 NOTE — ED PROVIDER NOTES
43-year-old white female with no significant past medical history presents to the emergency department with abdominal pain. Patient reports she was feeling fine yesterday without any pain. Starting yesterday evening she started having upper and right upper quadrant abdominal pain. She also had several episodes of vomiting overnight. She also had several episodes of loose stools. She currently has pain in the right upper quadrant of her abdomen. Pain is dull in nature. Pain does not radiate. Pain is worse when she palpates the right upper quadrant. No previous abdominal surgeries except for  and a hernia repair. No previous abdominal pain like this. Patient denies dysuria or hematuria. No fevers. No chest pain or shortness of breath. Patient denies tobacco or alcohol use. 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            Past Medical History:   Diagnosis Date    Depression     depression and anxiety    Headache     High cholesterol     Morbid obesity ([address]        Past Surgical History:   Procedure Laterality Date    ABDOMEN SURGERY PROC UNLISTED      Incisional hernia repair    HX  SECTION      HX DILATION AND CURETTAGE      HX DILATION AND CURETTAGE      HX GYN      c section    HX GYN      exploratory lap    HX GYN      curacloge    HX HEENT      wisdom teeth extraction    HX HERNIA REPAIR      HX KNEE ARTHROSCOPY      HX MENISCECTOMY      HX ORTHOPAEDIC      tumor excision right 3rd finger    HX ORTHOPAEDIC  10/14    right knee arthroscopic meniscus trimming dr Darryn Eddy    HX PELVIC LAPAROSCOPY      HX TUMOR REMOVAL      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    HX WISDOM TEETH EXTRACTION           Family History:   Problem Relation Age of Onset    Cancer Father         bladder and skin cancer    Elevated Lipids Father     Stroke Father         x 2    Dementia Mother        Social History     Socioeconomic History    Marital status:      Spouse name: Not on file    Number of children: Not on file    Years of education: Not on file    Highest education level: Not on file   Occupational History    Not on file   Social Needs    Financial resource strain: Not on file    Food insecurity:     Worry: Not on file     Inability: Not on file    Transportation needs:     Medical: Not on file     Non-medical: Not on file   Tobacco Use    Smoking status: Never Smoker    Smokeless tobacco: Never Used   Substance and Sexual Activity    Alcohol use: Yes     Comment: rare    Drug use: No    Sexual activity: Yes   Lifestyle    Physical activity:     Days per week: Not on file     Minutes per session: Not on file    Stress: Not on file   Relationships    Social connections:     Talks on phone: Not on file     Gets together: Not on file     Attends Congregation service: Not on file     Active member of club or organization: Not on file     Attends meetings of clubs or organizations: Not on file     Relationship status: Not on file    Intimate partner violence:     Fear of current or ex partner: Not on file     Emotionally abused: Not on file     Physically abused: Not on file     Forced sexual activity: Not on file   Other Topics Concern    Not on file   Social History Narrative    Not on file         ALLERGIES: Levaquin [levofloxacin]; Pcn [penicillins]; and Percocet [oxycodone-acetaminophen]    Review of Systems   Constitutional: Negative for fever. HENT: Negative for facial swelling. Eyes: Negative for pain. Respiratory: Negative for cough, chest tightness and shortness of breath. Cardiovascular: Negative for chest pain and leg swelling. Gastrointestinal: Positive for abdominal pain, diarrhea, nausea and vomiting. Negative for abdominal distention. Endocrine: Negative for polyuria. Genitourinary: Negative for difficulty urinating and flank pain. Musculoskeletal: Negative for arthralgias and back pain. Skin: Negative for color change. Neurological: Negative for dizziness and headaches. Hematological: Does not bruise/bleed easily. Psychiatric/Behavioral: Negative for agitation. All other systems reviewed and are negative. Vitals:    08/19/19 0809   BP: 126/69   Pulse: 83   Resp: 17   Temp: 98.4 °F (36.9 °C)   SpO2: 96%   Weight: 116.9 kg (257 lb 11.5 oz)   Height: 5' 5\" (1.651 m)            Physical Exam   Constitutional: She is oriented to person, place, and time. She appears well-developed and well-nourished. HENT:   Head: Normocephalic and atraumatic. Right Ear: External ear normal.   Left Ear: External ear normal.   Nose: Nose normal.   Mouth/Throat: Oropharynx is clear and moist.   Eyes: Pupils are equal, round, and reactive to light. EOM are normal. No scleral icterus. Neck: Normal range of motion. Neck supple. No JVD present. No tracheal deviation present. No thyromegaly present. Cardiovascular: Normal rate, regular rhythm, normal heart sounds and intact distal pulses. Exam reveals no friction rub. No murmur heard. Pulmonary/Chest: Effort normal and breath sounds normal. No stridor. No respiratory distress. She has no wheezes. She has no rales. She exhibits no tenderness. Abdominal: Soft. Bowel sounds are normal. She exhibits no distension. There is tenderness. There is no rebound and no guarding. Tenderness in the right upper quadrant. Tenderness in the epigastric region. No lower abdominal pain in the right or left lower quadrants. Musculoskeletal: Normal range of motion. She exhibits no edema or tenderness. Lymphadenopathy:     She has no cervical adenopathy. Neurological: She is alert and oriented to person, place, and time. She has normal reflexes. No cranial nerve deficit. Coordination normal.   Skin: Skin is warm and dry. No rash noted. No erythema. Psychiatric: She has a normal mood and affect. Her behavior is normal. Judgment and thought content normal.   Nursing note and vitals reviewed. MDM  Number of Diagnoses or Management Options  Diagnosis management comments: Patient has right upper quadrant pain. Will check gallbladder ultrasound. We will also check basic blood work, LFTs, lipase, urinalysis. Will give IV fluids and Zofran. Will reassess when testing is completed. Patient agrees. Amount and/or Complexity of Data Reviewed  Clinical lab tests: ordered and reviewed  Tests in the radiology section of CPT®: ordered and reviewed  Tests in the medicine section of CPT®: ordered and reviewed  Decide to obtain previous medical records or to obtain history from someone other than the patient: yes  Review and summarize past medical records: yes  Independent visualization of images, tracings, or specimens: yes    Risk of Complications, Morbidity, and/or Mortality  Presenting problems: high  Diagnostic procedures: high  Management options: high           Procedures    9:14 AM  Lab work is normal  Right upper quadrant ultrasound is normal with normal gallbladder  Reexamined patient and she still having some mild epigastric discomfort. Will try Maalox and Pepcid as well as Zofran. I offered CT scan of the abdomen and patient would prefer to hold off at this time. Will reexamine shortly. 9:58 AM  Pt is improved  No nausea now  No abd pain currently    Good return precautions given to patient. Close follow up with PCP recommended. Patient and/or family voices understanding of this plan. Discharge instructions were explained by me and all concerns were addressed.

## 2019-08-21 ENCOUNTER — PATIENT OUTREACH (OUTPATIENT)
Dept: OTHER | Age: 54
End: 2019-08-21

## 2019-08-21 NOTE — PROGRESS NOTES
Patient on report as eligible for Case Management. Left discreet message on voicemail with this CM contact information. Will attempt to contact again to offer [phone] Management services.

## 2019-08-22 ENCOUNTER — PATIENT OUTREACH (OUTPATIENT)
Dept: OTHER | Age: 54
End: 2019-08-22

## 2019-08-22 NOTE — PROGRESS NOTES
Patient identified as eligible for [address] [place] [address] services. Second telephone outreach attempted. Left discreet voicemail with this CM confidential contact information. Will send UTR letter.

## 2019-08-22 NOTE — LETTER
Ms. Patton Rings [address] [place] [id number] Dear Ms. Warren Camara, My name is Syed Lemos RN, Employee Care Manager for [facility] and I have been trying to reach you. The Employee Care Management [facility]) program is a free-of-charge confidential service provided to our employees and their family members covered by the [facility]. The program will provide an employee and his/her family with the [facility] expertise to assist in navigating the health care delivery system, provider services, and their overall care needsso as to assure and improve health care interactions and enhance the quality of life. This program is designed to provide you with the opportunity to have a [facility] care manager partner with you for the following services: 
 
 1) when you come home from the hospital or emergency room 2) when help is needed to manage your disease 3) when you need assistance coordinating services or appointments [facility] now partners with [facility]. If you are a qualifying employee, you may receive an additional 10 wellness incentive points for every month of active participation with an Employee Care Manager. [facility] is dedicated to empowering the good health of its community and improving the quality of care and care experiences for employees and their families. We are committed to safeguarding patient confidentiality and privacy, assuring that every employee has the respect he or she deserves in managing their health. The information shared with your care manager will not be shared with anyone else aside from those you identify as part of your care team, and will only be used to assist you with any identified care needs. Please contact me if you would like this service provided to you. Sincerely, Syed Lemos RN, BSN  Employee Care Manager [email or web address]

## 2019-08-27 ENCOUNTER — OFFICE VISIT (OUTPATIENT)
Dept: INTERNAL MEDICINE CLINIC | Age: 54
End: 2019-08-27

## 2019-08-27 VITALS
TEMPERATURE: 98.7 F | BODY MASS INDEX: 42.65 KG/M2 | DIASTOLIC BLOOD PRESSURE: 80 MMHG | SYSTOLIC BLOOD PRESSURE: 110 MMHG | RESPIRATION RATE: 16 BRPM | HEART RATE: 68 BPM | OXYGEN SATURATION: 98 % | WEIGHT: 256 LBS | HEIGHT: 65 IN

## 2019-08-27 DIAGNOSIS — Z12.39 BREAST CANCER SCREENING: ICD-10-CM

## 2019-08-27 DIAGNOSIS — Z09 HOSPITAL DISCHARGE FOLLOW-UP: ICD-10-CM

## 2019-08-27 DIAGNOSIS — R11.2 NAUSEA AND VOMITING, INTRACTABILITY OF VOMITING NOT SPECIFIED, UNSPECIFIED VOMITING TYPE: ICD-10-CM

## 2019-08-27 DIAGNOSIS — E66.01 OBESITY, MORBID (HCC): ICD-10-CM

## 2019-08-27 DIAGNOSIS — F41.9 ANXIETY AND DEPRESSION: ICD-10-CM

## 2019-08-27 DIAGNOSIS — E78.5 HYPERLIPIDEMIA, UNSPECIFIED HYPERLIPIDEMIA TYPE: ICD-10-CM

## 2019-08-27 DIAGNOSIS — F32.A ANXIETY AND DEPRESSION: ICD-10-CM

## 2019-08-27 DIAGNOSIS — E11.8 TYPE 2 DIABETES MELLITUS WITH COMPLICATION, WITHOUT LONG-TERM CURRENT USE OF INSULIN (HCC): Primary | ICD-10-CM

## 2019-08-27 NOTE — PROGRESS NOTES
Olamide Lozada is a 53 y.o. female who presents today for Medication Evaluation; Diabetes; Cholesterol Problem; and Anxiety  . She has a history of   Patient Active Problem List   Diagnosis Code    New onset of headaches after age 47 R48    Visual changes H53.9    Obesity, morbid ([address] E66.01    Other hyperlipidemia E78.49    Anxiety and depression F41.9, F32.9    IFG (impaired fasting glucose) R73.01    Type 2 diabetes mellitus with complication, without long-term current use of insulin (HCC) E11.8   . Today patient is here for follow-up. Saba Hughes DM: A1c was elevated in the past.  Given her success of weight loss with Saxenda we decided to change her to a once weekly Ozempic, as insurance was no longer covering this medication. Weight has come down about 20#. Having more GI s/e with this. ER visit on 8/19 thought to be due to the Ozempic at the 0.5mg dose. Anxiety/Depression: Last visit we decided to resume an SSRI as she had had side effects with the Lexapro. We started her on sertraline 50 mg. She also continued to take her Wellbutrin. Since she notes that mood is doing better. Her mother is not doing too well. Will be moving her mother to a nursing pt has been to a therapist. Denies any drugs or alcohol use. She denies any suicidal or homicidal thoughts or plans. Hyperlipidemia   We start a statin at last visit. She notes that she is been tolerating this. ROS: taking medications as instructed, no medication side effects noted  No new myalgias, no joint pains, no weakness  No TIA's, no chest pain on exertion, no dyspnea on exertion, no swelling of ankles.    Lab Results   Component Value Date/Time    Cholesterol, total 243 (H) 07/03/2019 09:22 AM    HDL Cholesterol 46 07/03/2019 09:22 AM    LDL, calculated 143 (H) 07/03/2019 09:22 AM    VLDL, calculated 54 (H) 07/03/2019 09:22 AM    Triglyceride 272 (H) 07/03/2019 09:22 AM       ROS  Review of Systems   Constitutional: Negative for chills, fever and weight loss. HENT: Negative for congestion and sore throat. Eyes: Negative for blurred vision, double vision and photophobia. Respiratory: Negative for cough and shortness of breath. Cardiovascular: Negative for chest pain, palpitations and leg swelling. Gastrointestinal: Negative for abdominal pain, constipation, diarrhea, heartburn, nausea and vomiting. GI symptoms have resolved   Genitourinary: Negative for dysuria, frequency and urgency. Musculoskeletal: Negative for joint pain and myalgias. Skin: Negative for rash. Neurological: Negative. Negative for headaches. Endo/Heme/Allergies: Does not bruise/bleed easily. Psychiatric/Behavioral: Negative for memory loss and suicidal ideas. Visit Vitals  /80 (BP 1 Location: Left arm, BP Patient Position: Sitting)   Pulse 68   Temp 98.7 °F (37.1 °C) (Oral)   Resp 16   Ht 5' 5\" (1.651 m)   Wt 256 lb (116.1 kg)   SpO2 98%   BMI 42.60 kg/m²       Physical Exam   Constitutional: She is oriented to person, place, and time. She appears well-developed and well-nourished. No distress. HENT:   Head: Normocephalic and atraumatic. Neck: Normal range of motion. Neck supple. No thyromegaly present. Cardiovascular: Normal rate and regular rhythm. No murmur heard. Pulmonary/Chest: Effort normal and breath sounds normal. She has no wheezes. Abdominal: Soft. Bowel sounds are normal. She exhibits no distension. Musculoskeletal: She exhibits no edema. Neurological: She is alert and oriented to person, place, and time. No cranial nerve deficit. Skin: Skin is warm and dry. Psychiatric: She has a normal mood and affect. Her behavior is normal.         Current Outpatient Medications   Medication Sig    omeprazole magnesium (PRILOSEC PO) Take  by mouth.  famotidine (PEPCID) 20 mg tablet Take 1 Tab by mouth two (2) times a day for 10 days.     ondansetron (ZOFRAN ODT) 4 mg disintegrating tablet Take 1 Tab by mouth every eight (8) hours as needed for Nausea.  atorvastatin (LIPITOR) 10 mg tablet Take 1 Tab by mouth daily.  semaglutide (OZEMPIC) 0.25 mg/0.2 mL (2 mg/1.5 mL) sub-q pen SubQ: Initial: 0.25 mg once weekly for 4 weeks then increase to 0.5 mg once weekly    Insulin Needles, Disposable, 31 gauge x 5/16\" ndle by SubCUTAneous route every seven (7) days.  sertraline (ZOLOFT) 50 mg tablet Take 1 Tab by mouth daily.  buPROPion XL (WELLBUTRIN XL) 300 mg XL tablet Take 1 Tab by mouth every morning.  diclofenac EC (VOLTAREN) 75 mg EC tablet Take 1 Tab by mouth two (2) times daily (after meals).  Insulin Needles, Disposable, (NOVOFINE 32) 32 gauge x 1/4\" ndle For daily use, ok to substitute generic alternative- must work with bernadette[facility]     No current facility-administered medications for this visit.          Past Medical History:   Diagnosis Date    Depression     depression and anxiety    Headache     High cholesterol     Morbid obesity ([address]       Past Surgical History:   Procedure Laterality Date    ABDOMEN SURGERY PROC UNLISTED      Incisional hernia repair    HX  SECTION      HX DILATION AND CURETTAGE      HX DILATION AND CURETTAGE      HX GYN  1998    c section    HX GYN      exploratory lap    HX GYN      curacloge    HX HEENT      wisdom teeth extraction    HX HERNIA REPAIR      HX KNEE ARTHROSCOPY      HX MENISCECTOMY      HX ORTHOPAEDIC  2013    tumor excision right 3rd finger    HX ORTHOPAEDIC  10/14    right knee arthroscopic meniscus trimming dr James Almonte    HX PELVIC LAPAROSCOPY      HX TUMOR REMOVAL      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 \"        Assessment/Plan  Diagnoses and all orders for this visit:    1. Type 2 diabetes mellitus with complication, without long-term current use of insulin ([address] -doing very well with the Ozempic. She is lost 20 pounds. Nausea vomiting thought to be secondary to higher dose of Ozempic. Patient will see how she does over the next couple weeks and otherwise drop the dose back down to 0.25  -     MICROALBUMIN, UR, 24 HR; Future  -     HEMOGLOBIN A1C WITH EAG; Future  -     METABOLIC PANEL, COMPREHENSIVE; Future    2. Anxiety and depression -stress is still very high. She has started seeing a therapist which I encouraged her to continue. Continue Zoloft at same dose as well as Wellbutrin. 3. Obesity, morbid ([address] -gradually on weight loss  -     METABOLIC PANEL, COMPREHENSIVE; Future    4. Hyperlipidemia, unspecified hyperlipidemia type -on a statin. She will get her blood work done in October  -     LIPID PANEL; Future  -     METABOLIC PANEL, COMPREHENSIVE; Future    5. Breast cancer screening  -     [facility] MAMMO BI SCREENING INCL CAD; Future    6. Hospital discharge follow-up -ER visit due to nausea vomiting. This is resolved    7. Nausea and vomiting, intractability of vomiting not specified, unspecified vomiting type            Keyana Whitehead MD  8/27/2019    This note was created with the help of speech recognition software Floyd Lucero) and may contain some 'sound alike' errors.

## 2019-09-13 DIAGNOSIS — F41.9 ANXIETY AND DEPRESSION: ICD-10-CM

## 2019-09-13 DIAGNOSIS — F32.A ANXIETY AND DEPRESSION: ICD-10-CM

## 2019-09-13 RX ORDER — SERTRALINE HYDROCHLORIDE 100 MG/1
100 TABLET, FILM COATED ORAL DAILY
Qty: 30 TAB | Refills: 3 | Status: SHIPPED | OUTPATIENT
Start: 2019-09-13 | End: 2020-01-16 | Stop reason: SDUPTHER

## 2019-09-25 ENCOUNTER — PATIENT OUTREACH (OUTPATIENT)
Dept: OTHER | Age: 54
End: 2019-09-25

## 2019-09-25 NOTE — LETTER
Ms. Paulette Brown [address] [place] [id number] Dear Ms. Zoe Nolascoo, My name is Clay Cheney RN, Employee Care Manager for [facility], and I have been trying to reach you. The Employee Care Management [facility]) program is a free-of-charge, confidential service provided to our employees and their family members covered by the [employer]. I can help you with care transitions such as when you come home from the hospital, when help is needed to manage your disease, or when you need assistance coordinating services or appointments. [employer] now partners with [facility]. If you are a qualifying employee, you may receive an additional 10 wellness incentive points for every month of active participation with an Employee Care Manager. As healthcare providers, we know that patients do better when they have close follow up with a primary care provider (PCP). I can help you find one that is convenient to you and covered by your insurance. I can also help you understand any after visit instructions, such as what symptoms to watch out for, or any new or changed medications. We can work together using your preferred communication -- telephone, email, FFWDhart. If you do not have a [employer] account, I can help you request access. Our program is designed to provide you with the opportunity to have a [facility] care manager partner with you for your healthcare needs. Due to not being able to reach you, I am closing out this current episode of care, but will remain available to you should you have any questions. Please contact me at the below number if I can provide you with assistance for any of the above services. Sincerely, Clay Cheney RN, BSN  Employee Care Manager [email or web address]

## 2019-10-04 DIAGNOSIS — E11.8 TYPE 2 DIABETES MELLITUS WITH COMPLICATION, WITHOUT LONG-TERM CURRENT USE OF INSULIN (HCC): ICD-10-CM

## 2019-10-04 DIAGNOSIS — E78.5 HYPERLIPIDEMIA, UNSPECIFIED HYPERLIPIDEMIA TYPE: ICD-10-CM

## 2019-10-11 ENCOUNTER — PATIENT MESSAGE (OUTPATIENT)
Dept: INTERNAL MEDICINE CLINIC | Age: 54
End: 2019-10-11

## 2019-10-17 ENCOUNTER — DOCUMENTATION ONLY (OUTPATIENT)
Dept: INTERNAL MEDICINE CLINIC | Age: 54
End: 2019-10-17

## 2019-10-23 DIAGNOSIS — E78.5 HYPERLIPIDEMIA, UNSPECIFIED HYPERLIPIDEMIA TYPE: ICD-10-CM

## 2019-10-23 DIAGNOSIS — E11.8 TYPE 2 DIABETES MELLITUS WITH COMPLICATION, WITHOUT LONG-TERM CURRENT USE OF INSULIN (HCC): ICD-10-CM

## 2019-10-23 DIAGNOSIS — E66.01 OBESITY, MORBID (HCC): ICD-10-CM

## 2019-10-24 NOTE — TELEPHONE ENCOUNTER
Franklin Louis MD 10/18/2019 11:42 AM EDT    To know if this PA has been done?  ----- Message -----  From: Javy More  Sent: 10/17/2019 11:17 PM EDT  To: Michaela Fountain MD  Subject: RE: Prescription Question     I was hoping that the [address] [place] [address] Avenue would be approved. Was it actually denied? Does Trulicity have similar side effects as Ozempic?  ----- Message -----  From: Micheala Fountain MD  Sent: 10/17/2019 8:27 PM EDT  To: Javy More  Subject: RE: Prescription Question  Hi,     How would you like to proceed? Could look into once weekly trulicity. Michaela Fountain    ----- Message -----   From: Javy More   Sent: 10/11/2019 2:36 PM EDT   To: Michaela Fountain MD  Subject: RE: Prescription Question    Yes, I went to [facility] and was told it needed a PA. Please just let me know when you have a chance to submit it. Thank you so very much. Kelly  ----- Message -----  From: Monica Bee LPN  Sent: 79/34/9318 12:04 PM EDT  To: Javy More  Subject: RE: Prescription Question  Good morning,     Dr Claribel Salcedo has sent the prescription to your pharmacy. We have not received any notification from them regarding Victoza needing a prior authorization. Have you tried picking it up? The pharmacy would be able to tell you if it needs a prior authorization. Thank you,     Jacquelin Vasquez LPN            ----- Message -----   From: Javy More   Sent: 10/11/2019 10:13 AM EDT   To: Michaela Fountain MD  Subject: Prescription Question    Good Morning,  I wondered what the status of the Victoza authorization was.     Thank you,  Kelly

## 2019-10-29 ENCOUNTER — TELEPHONE (OUTPATIENT)
Dept: INTERNAL MEDICINE CLINIC | Age: 54
End: 2019-10-29

## 2019-10-29 NOTE — TELEPHONE ENCOUNTER
Velma Client with [facility] is requesting to follow up with the nurse, states for  Victoza 3 pack they are requesting  additional  Clinical information on failed previous medications such as metformin with in the past 120 days with reasoning on why patient failed that trail, she can be reached at  [phone] ref #  [id number]

## 2019-10-30 NOTE — TELEPHONE ENCOUNTER
Attempted to contact Pt, no answer and unable to LVM d/t mailbox being full. Sent Five minutes message notifying Pt of denial of Victoza and to schedule f/u to discuss alternatives as per provider.

## 2019-11-22 ENCOUNTER — DOCUMENTATION ONLY (OUTPATIENT)
Dept: NEUROLOGY | Age: 54
End: 2019-11-22

## 2019-11-22 ENCOUNTER — HOSPITAL ENCOUNTER (OUTPATIENT)
Dept: GENERAL RADIOLOGY | Age: 54
Discharge: HOME | End: 2019-11-22
Attending: PSYCHIATRY & NEUROLOGY
Payer: COMMERCIAL

## 2019-11-22 DIAGNOSIS — R76.11 POSITIVE PPD: Primary | ICD-10-CM

## 2019-11-22 DIAGNOSIS — R76.11 POSITIVE PPD: ICD-10-CM

## 2019-11-22 PROCEDURE — 71046 X-RAY EXAM CHEST 2 VIEWS: CPT

## 2019-12-05 DIAGNOSIS — J06.9 UPPER RESPIRATORY TRACT INFECTION, UNSPECIFIED TYPE: Primary | ICD-10-CM

## 2019-12-05 RX ORDER — AZITHROMYCIN 250 MG/1
TABLET, FILM COATED ORAL
Qty: 6 TAB | Refills: 0 | Status: SHIPPED | OUTPATIENT
Start: 2019-12-05 | End: 2019-12-10

## 2019-12-17 ENCOUNTER — HOSPITAL ENCOUNTER (OUTPATIENT)
Dept: LAB | Age: 54
Discharge: HOME | End: 2019-12-17

## 2019-12-17 ENCOUNTER — OFFICE VISIT (OUTPATIENT)
Dept: INTERNAL MEDICINE CLINIC | Age: 54
End: 2019-12-17

## 2019-12-17 VITALS
SYSTOLIC BLOOD PRESSURE: 120 MMHG | TEMPERATURE: 98.8 F | BODY MASS INDEX: 42.32 KG/M2 | HEIGHT: 65 IN | WEIGHT: 254 LBS | DIASTOLIC BLOOD PRESSURE: 80 MMHG | OXYGEN SATURATION: 98 % | HEART RATE: 67 BPM | RESPIRATION RATE: 16 BRPM

## 2019-12-17 DIAGNOSIS — E78.49 OTHER HYPERLIPIDEMIA: ICD-10-CM

## 2019-12-17 DIAGNOSIS — F41.9 ANXIETY AND DEPRESSION: ICD-10-CM

## 2019-12-17 DIAGNOSIS — E11.8 TYPE 2 DIABETES MELLITUS WITH COMPLICATION, WITHOUT LONG-TERM CURRENT USE OF INSULIN (HCC): ICD-10-CM

## 2019-12-17 DIAGNOSIS — E11.8 TYPE 2 DIABETES MELLITUS WITH COMPLICATION, WITHOUT LONG-TERM CURRENT USE OF INSULIN (HCC): Primary | ICD-10-CM

## 2019-12-17 DIAGNOSIS — F32.A ANXIETY AND DEPRESSION: ICD-10-CM

## 2019-12-17 DIAGNOSIS — E66.01 OBESITY, MORBID (HCC): ICD-10-CM

## 2019-12-17 LAB
ALBUMIN SERPL-MCNC: 4 G/DL (ref 3.5–5)
ALBUMIN/GLOB SERPL: 1.3 {RATIO} (ref 1.1–2.2)
ALP SERPL-CCNC: 76 U/L (ref 45–117)
ALT SERPL-CCNC: 22 U/L (ref 12–78)
ANION GAP SERPL CALC-SCNC: 5 MMOL/L (ref 5–15)
AST SERPL-CCNC: 13 U/L (ref 15–37)
BILIRUB SERPL-MCNC: 0.4 MG/DL (ref 0.2–1)
BUN SERPL-MCNC: 15 MG/DL (ref 6–20)
BUN/CREAT SERPL: 20 (ref 12–20)
CALCIUM SERPL-MCNC: 9.2 MG/DL (ref 8.5–10.1)
CHLORIDE SERPL-SCNC: 107 MMOL/L (ref 97–108)
CHOLEST SERPL-MCNC: 276 MG/DL
CO2 SERPL-SCNC: 28 MMOL/L (ref 21–32)
CREAT SERPL-MCNC: 0.74 MG/DL (ref 0.55–1.02)
CREAT UR-MCNC: 173 MG/DL
EST. AVERAGE GLUCOSE BLD GHB EST-MCNC: 120 MG/DL
GLOBULIN SER CALC-MCNC: 3.2 G/DL (ref 2–4)
GLUCOSE SERPL-MCNC: 93 MG/DL (ref 65–100)
HBA1C MFR BLD: 5.8 % (ref 4–5.6)
HDLC SERPL-MCNC: 55 MG/DL
HDLC SERPL: 5 {RATIO} (ref 0–5)
LDLC SERPL CALC-MCNC: 195 MG/DL (ref 0–100)
LIPID PROFILE,FLP: ABNORMAL
MICROALBUMIN UR-MCNC: 1.02 MG/DL
MICROALBUMIN/CREAT UR-RTO: 6 MG/G (ref 0–30)
POTASSIUM SERPL-SCNC: 4.6 MMOL/L (ref 3.5–5.1)
PROT SERPL-MCNC: 7.2 G/DL (ref 6.4–8.2)
SODIUM SERPL-SCNC: 140 MMOL/L (ref 136–145)
TRIGL SERPL-MCNC: 130 MG/DL (ref ?–150)
VLDLC SERPL CALC-MCNC: 26 MG/DL

## 2019-12-17 RX ORDER — METFORMIN HYDROCHLORIDE 500 MG/1
1000 TABLET, EXTENDED RELEASE ORAL
Qty: 60 TAB | Refills: 1 | Status: SHIPPED | OUTPATIENT
Start: 2019-12-17 | End: 2020-01-16 | Stop reason: SDUPTHER

## 2019-12-17 NOTE — PROGRESS NOTES
Sonya Gongora is a 53 y.o. female who presents today for Medication Evaluation; Diabetes; Cholesterol Problem; and Anxiety  . She has a history of   Patient Active Problem List   Diagnosis Code    New onset of headaches after age 47 R48    Visual changes H53.9    Ob[address], morbid [address] [address] [facility] E66.01    Other hyperlipidemia E78.49    Anxiety and depression F41.9, F32.9    IFG (impaired fasting glucose) R73.01    Type 2 diabetes mellitus with complication, without long-term current use of insulin (HCC) E11.8   . Today patient is here for f/u. Diabetes Mellitus follow-up-patient had side effects with the Ozempic and was not able to continue. We did prescribe her Victoza as she had done well with Saxenda in the past but this is not approved by her insurance. Since that time her weight has been stable. Had s/e with Metformin in the past but this is not been within the last 120 days. We will try metformin again and otherwise also sent in for Trulicity. Last hemoglobin a1c   Lab Results   Component Value Date/Time    Hemoglobin A1c 6.0 (H) 07/03/2019 09:22 AM     No components found for: POCA1C, HBA1C POC  medication compliance: compliant all of the time. Diabetic diet compliance: compliant most of the time. Further diabetic ROS: no polyuria or polydipsia, no chest pain, dyspnea or TIA's, no numbness, tingling or pain in extremities. Diabetes Complications: none  Microalbuminuria: No results found for: MCACR, MCA1, MCA2, MCA3, MCAU, MCAU2, MCALPOCT    Anxiety and depression: Patient with high stress over taking care of her mother who has progressive dementia. We did increase at last visit her SSRI 200 mg. Since she notes of her anxiety and depression has improved. She has been able to get her mother into a nursing home which is relieved a lot of stress and pressure on her. In addition her mother's safety is improved due to the increased attention she will get.   She also continues to take Wellbutrin. Hyperlipidemia-due for repeat testing. On Lipitor   ROS: taking medications as instructed, no medication side effects noted  No new myalgias, no joint pains, no weakness  No TIA's, no chest pain on exertion, no dyspnea on exertion, no swelling of ankles. Lab Results   Component Value Date/Time    Cholesterol, total 243 (H) 07/03/2019 09:22 AM    HDL Cholesterol 46 07/03/2019 09:22 AM    LDL, calculated 143 (H) 07/03/2019 09:22 AM    VLDL, calculated 54 (H) 07/03/2019 09:22 AM    Triglyceride 272 (H) 07/03/2019 09:22 AM         ROS  Review of Systems   Constitutional: Negative for chills, fever and weight loss. HENT: Negative for congestion and sore throat. Eyes: Negative for blurred vision, double vision and photophobia. Respiratory: Negative for cough and shortness of breath. Cardiovascular: Negative for chest pain, palpitations and leg swelling. Gastrointestinal: Negative for abdominal pain, constipation, diarrhea, heartburn, nausea and vomiting. Genitourinary: Negative for dysuria, frequency and urgency. Musculoskeletal: Negative for joint pain and myalgias. Skin: Negative for rash. Neurological: Negative. Negative for headaches. Endo/Heme/Allergies: Does not bruise/bleed easily. Psychiatric/Behavioral: Negative for memory loss and suicidal ideas. Visit Vitals  /80 (BP 1 Location: Left arm, BP Patient Position: Sitting)   Pulse 67   Temp 98.8 °F (37.1 °C) (Oral)   Resp 16   Ht 5' 5\" (1.651 m)   Wt 254 lb (115.2 kg)   SpO2 98%   BMI 42.27 kg/m²       Physical Exam  Constitutional:       General: She is not in acute distress. Appearance: She is well-developed. HENT:      Head: Normocephalic and atraumatic. Neck:      Musculoskeletal: Normal range of motion and neck supple. Thyroid: No thyromegaly. Cardiovascular:      Rate and Rhythm: Normal rate and regular rhythm. Heart sounds: No murmur.    Pulmonary:      Effort: Pulmonary effort is normal.      Breath sounds: Normal breath sounds. No wheezing. Abdominal:      General: Bowel sounds are normal. There is no distension. Palpations: Abdomen is soft. Skin:     General: Skin is warm and dry. Neurological:      Mental Status: She is alert and oriented to person, place, and time. Cranial Nerves: No cranial nerve deficit. Psychiatric:         Behavior: Behavior normal.           Current Outpatient Medications   Medication Sig    dulaglutide (TRULICITY) 4.39 PB/4.4 mL sub-q pen 0.5 mL by SubCUTAneous route every seven (7) days.  metFORMIN ER (GLUCOPHAGE XR) 500 mg tablet Take 2 Tabs by mouth daily (with dinner).  sertraline (ZOLOFT) 100 mg tablet Take 1 Tab by mouth daily.  ondansetron (ZOFRAN ODT) 4 mg disintegrating tablet Take 1 Tab by mouth every eight (8) hours as needed for Nausea.  atorvastatin (LIPITOR) 10 mg tablet Take 1 Tab by mouth daily.  Insulin Needles, Disposable, 31 gauge x 5/16\" ndle by SubCUTAneous route every seven (7) days.  buPROPion XL (WELLBUTRIN XL) 300 mg XL tablet Take 1 Tab by mouth every morning.  diclofenac EC (VOLTAREN) 75 mg EC tablet Take 1 Tab by mouth two (2) times daily (after meals).  Insulin Needles, Disposable, (NOVOFINE 32) 32 gauge x 1/4\" ndle For daily use, ok to substitute generic alternative- must work with roland     No current facility-administered medications for this visit.          Past Medical History:   Diagnosis Date    Depression     depression and anxiety    Headache     High cholesterol     Morbid obesity ([facility] [address]       Past Surgical History:   Procedure Laterality Date    ABDOMEN SURGERY PROC UNLISTED      Incisional hernia repair    HX  SECTION      HX DILATION AND CURETTAGE      HX DILATION AND CURETTAGE      HX GYN  1998    c section    HX GYN  1990    exploratory lap    HX GYN      curacloge    HX HEENT      wisdom teeth extraction    HX HERNIA REPAIR      HX KNEE ARTHROSCOPY  HX MENISCECTOMY      HX ORTHOPAEDIC  2013    tumor excision right 3rd finger    HX ORTHOPAEDIC  10/14    right knee arthroscopic meniscus trimming dr Mirta Ortiz    HX PELVIC LAPAROSCOPY      HX TUMOR REMOVAL      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 \"        Assessment/Plan  Diagnoses and all orders for this visit:    1. Type 2 diabetes mellitus with complication, without long-term current use of insulin (HCC)-patient had to fail metformin with 120 days. She has previously not tolerated this but will attempt metformin again. She will start with 500 mg and increase to 2 tablets after several days if no side effects.  -     dulaglutide (TRULICITY) 8.47 SE/2.5 mL sub-q pen; 0.5 mL by SubCUTAneous route every seven (7) days.  -     METABOLIC PANEL, COMPREHENSIVE; Future  -     HEMOGLOBIN A1C WITH EAG; Future  -     MICROALBUMIN, UR, RAND W/ MICROALB/CREAT RATIO; Future  -     metFORMIN ER (GLUCOPHAGE XR) 500 mg tablet; Take 2 Tabs by mouth daily (with dinner). 2. Anxiety and depression-stress and anxiety level much better since having placed mother in a nursing home. Mom is also safer at this location. We discussed continuing current therapy for least the next 3 months before trying to titrate back down the SSRI. 3. Obesity, morbid (HCC)-congratulated on weight maintenance. 4. Other hyperlipidemia-patient is on Lipitor. We will repeat lipids today.   -     LIPID PANEL; Future        Follow-up and Dispositions    · Return in about 3 months (around 3/17/2020). Vincenzo Grigsby MD  12/17/2019    This note was created with the help of speech recognition software Estrella Ruiz) and may contain some 'sound alike' errors.

## 2019-12-23 ENCOUNTER — DOCUMENTATION ONLY (OUTPATIENT)
Dept: INTERNAL MEDICINE CLINIC | Age: 54
End: 2019-12-23

## 2020-01-03 DIAGNOSIS — Z82.49 FAMILY HISTORY OF ABDOMINAL AORTIC ANEURYSM (AAA): Primary | ICD-10-CM

## 2020-01-16 ENCOUNTER — OFFICE VISIT (OUTPATIENT)
Dept: INTERNAL MEDICINE CLINIC | Age: 55
End: 2020-01-16

## 2020-01-16 VITALS
BODY MASS INDEX: 43.65 KG/M2 | HEIGHT: 65 IN | RESPIRATION RATE: 18 BRPM | WEIGHT: 262 LBS | TEMPERATURE: 98.5 F | DIASTOLIC BLOOD PRESSURE: 76 MMHG | OXYGEN SATURATION: 99 % | SYSTOLIC BLOOD PRESSURE: 120 MMHG | HEART RATE: 77 BPM

## 2020-01-16 DIAGNOSIS — Z82.49 FAMILY HISTORY OF ABDOMINAL AORTIC ANEURYSM (AAA): ICD-10-CM

## 2020-01-16 DIAGNOSIS — F41.9 ANXIETY AND DEPRESSION: ICD-10-CM

## 2020-01-16 DIAGNOSIS — M25.561 ACUTE PAIN OF RIGHT KNEE: Primary | ICD-10-CM

## 2020-01-16 DIAGNOSIS — E11.8 TYPE 2 DIABETES MELLITUS WITH COMPLICATION, WITHOUT LONG-TERM CURRENT USE OF INSULIN (HCC): ICD-10-CM

## 2020-01-16 DIAGNOSIS — E78.5 HYPERLIPIDEMIA, UNSPECIFIED HYPERLIPIDEMIA TYPE: ICD-10-CM

## 2020-01-16 DIAGNOSIS — F32.A ANXIETY AND DEPRESSION: ICD-10-CM

## 2020-01-16 RX ORDER — METHYLPREDNISOLONE 4 MG/1
TABLET ORAL
Qty: 1 DOSE PACK | Refills: 0 | Status: SHIPPED | OUTPATIENT
Start: 2020-01-16 | End: 2020-06-08

## 2020-01-16 RX ORDER — BUPROPION HYDROCHLORIDE 300 MG/1
300 TABLET ORAL
Qty: 90 TAB | Refills: 1 | Status: SHIPPED | OUTPATIENT
Start: 2020-01-16 | End: 2020-08-12 | Stop reason: SDUPTHER

## 2020-01-16 RX ORDER — ATORVASTATIN CALCIUM 40 MG/1
40 TABLET, FILM COATED ORAL DAILY
Qty: 90 TAB | Refills: 1 | Status: SHIPPED | OUTPATIENT
Start: 2020-01-16 | End: 2020-08-12 | Stop reason: SDUPTHER

## 2020-01-16 RX ORDER — METFORMIN HYDROCHLORIDE 500 MG/1
1000 TABLET, EXTENDED RELEASE ORAL
Qty: 180 TAB | Refills: 1 | Status: SHIPPED | OUTPATIENT
Start: 2020-01-16 | End: 2020-08-12 | Stop reason: SDUPTHER

## 2020-01-16 RX ORDER — SERTRALINE HYDROCHLORIDE 100 MG/1
100 TABLET, FILM COATED ORAL DAILY
Qty: 90 TAB | Refills: 1 | Status: SHIPPED | OUTPATIENT
Start: 2020-01-16 | End: 2020-08-09

## 2020-01-16 NOTE — PROGRESS NOTES
Gaurav Craft is a 53 y.o. female who presents today for Leg Pain  . She has a history of   Patient Active Problem List   Diagnosis Code    New onset of headaches after age 47 R48    Visual changes H53.9    Obesity, morbid ([address] E66.01    Other hyperlipidemia E78.49    Anxiety and depression F41.9, F32.9    IFG (impaired fasting glucose) R73.01    Type 2 diabetes mellitus with complication, without long-term current use of insulin (HCC) E11.8   . Today patient is here for an acute visit. .     Musculoskeletal pain:  She wishes to address discomfort in the Right knee at today's visit. This has been moderate in nature, gradual, starting about 6 days ago in onset. Knee has been a bit swollen but no  fever, rash, erythema of joints, injury has been noted by the patient. Self treatment: Diclofenac BID for 6 days. Fullness and pain . She notes that this knee has swollen up but is not very hot. It does seem to be more swollen and tender to the back of the knee. Denies any other radiation of the pain. Does have a history of a meniscal problem to this knee. Is able to bear weight on the knee. DM: Patient currently taking metformin. I remember seeing on approval for Trulicity but patient notes that she was never notified. On two metformin daily. Weight up 8#. Family History of AAA: Patient notes that her mother passed away just before Christmas with a ruptured abdominal aortic aneurysm. Overall she is under a lot of stress but notes that her mental health is stable. Hyperlipidemia  We have increased her atorvastatin to 40 mg. We will refill this.   Lab Results   Component Value Date/Time    Cholesterol, total 276 (H) 12/17/2019 11:15 AM    HDL Cholesterol 55 12/17/2019 11:15 AM    LDL, calculated 195 (H) 12/17/2019 11:15 AM    VLDL, calculated 26 12/17/2019 11:15 AM    Triglyceride 130 12/17/2019 11:15 AM    CHOL/HDL Ratio 5.0 12/17/2019 11:15 AM         ROS  Review of Systems   Constitutional: Negative for chills, fever and weight loss. HENT: Negative for congestion and sore throat. Eyes: Negative for blurred vision, double vision and photophobia. Respiratory: Negative for cough and shortness of breath. Cardiovascular: Negative for chest pain, palpitations and leg swelling. Gastrointestinal: Negative for abdominal pain, constipation, diarrhea, heartburn, nausea and vomiting. Genitourinary: Negative for dysuria, frequency and urgency. Musculoskeletal: Positive for joint pain. Negative for myalgias. Skin: Negative for rash. Neurological: Negative. Negative for headaches. Endo/Heme/Allergies: Does not bruise/bleed easily. Psychiatric/Behavioral: Positive for depression. Negative for memory loss and suicidal ideas. Visit Vitals  /76 (BP 1 Location: Left arm, BP Patient Position: Sitting)   Pulse 77   Temp 98.5 °F (36.9 °C) (Oral)   Resp 18   Ht 5' 5\" (1.651 m)   Wt 262 lb (118.8 kg)   SpO2 99%   BMI 43.60 kg/m²       Physical Exam  Constitutional:       General: She is not in acute distress. Appearance: She is well-developed. HENT:      Head: Normocephalic and atraumatic. Neck:      Musculoskeletal: Normal range of motion and neck supple. Thyroid: No thyromegaly. Cardiovascular:      Rate and Rhythm: Normal rate and regular rhythm. Heart sounds: No murmur. Pulmonary:      Effort: Pulmonary effort is normal.      Breath sounds: Normal breath sounds. No wheezing. Abdominal:      General: Bowel sounds are normal. There is no distension. Palpations: Abdomen is soft. Musculoskeletal:      Comments: Mild swelling to right knee. No warmth. Full range of motion. No crepitus. Some fullness to posterior aspect of knee. No calf tenderness. Skin:     General: Skin is warm and dry. Neurological:      Mental Status: She is alert and oriented to person, place, and time. Cranial Nerves: No cranial nerve deficit.    Psychiatric:         Behavior: Behavior normal.           Current Outpatient Medications   Medication Sig    atorvastatin (LIPITOR) 40 mg tablet Take 1 Tab by mouth daily.  methylPREDNISolone (MEDROL DOSEPACK) 4 mg tablet As instructed.  buPROPion XL (WELLBUTRIN XL) 300 mg XL tablet Take 1 Tab by mouth every morning.  metFORMIN ER (GLUCOPHAGE XR) 500 mg tablet Take 2 Tabs by mouth daily (with dinner).  sertraline (ZOLOFT) 100 mg tablet Take 1 Tab by mouth daily.  dulaglutide (TRULICITY) 6.23 BS/3.5 mL sub-q pen 0.5 mL by SubCUTAneous route every seven (7) days.  ondansetron (ZOFRAN ODT) 4 mg disintegrating tablet Take 1 Tab by mouth every eight (8) hours as needed for Nausea.  atorvastatin (LIPITOR) 10 mg tablet Take 1 Tab by mouth daily.  Insulin Needles, Disposable, 31 gauge x 5/16\" ndle by SubCUTAneous route every seven (7) days.  diclofenac EC (VOLTAREN) 75 mg EC tablet Take 1 Tab by mouth two (2) times daily (after meals).  Insulin Needles, Disposable, (NOVOFINE 32) 32 gauge x 1/4\" ndle For daily use, ok to substitute generic alternative- must work with roland     No current facility-administered medications for this visit.          Past Medical History:   Diagnosis Date    Depression     depression and anxiety    Headache     High cholesterol     Morbid obesity ([address]       Past Surgical History:   Procedure Laterality Date    ABDOMEN SURGERY PROC UNLISTED      Incisional hernia repair    HX  SECTION      HX DILATION AND CURETTAGE      HX DILATION AND CURETTAGE      HX GYN  1998    c section    HX GYN  1990    exploratory lap    HX GYN      curacloge    HX HEENT      wisdom teeth extraction    HX HERNIA REPAIR      HX KNEE ARTHROSCOPY      HX MENISCECTOMY      HX ORTHOPAEDIC  2013    tumor excision right 3rd finger    HX ORTHOPAEDIC  10/14    right knee arthroscopic meniscus trimming dr Felix Solomon    HX PELVIC LAPAROSCOPY      HX TUMOR REMOVAL      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 \"        Assessment/Plan  Diagnoses and all orders for this visit:    1. Acute pain of right knee-no acute injury noted. Will place on a Medrol Dosepak. Low concern for DVT. If this persist would send to orthopedist.  -     methylPREDNISolone (MEDROL DOSEPACK) 4 mg tablet; As instructed. 2. Family history of abdominal aortic aneurysm (AAA)-has her ultrasound scheduled. 3. Type 2 diabetes mellitus with complication, without long-term current use of insulin (HCC)-I believe the Trulicity was covered. She will check to see if this is the case. She has gained some weight since last visit. -     metFORMIN ER (GLUCOPHAGE XR) 500 mg tablet; Take 2 Tabs by mouth daily (with dinner). -     dulaglutide (TRULICITY) 4.66 VZ/9.9 mL sub-q pen; 0.5 mL by SubCUTAneous route every seven (7) days. 4. Hyperlipidemia, unspecified hyperlipidemia type-we have increased her atorvastatin to 40 mg  -     atorvastatin (LIPITOR) 40 mg tablet; Take 1 Tab by mouth daily. 5. Anxiety and depression-notes that she is under a lot of stress with her mother's passing but overall she feels like she is doing okay. -     buPROPion XL (WELLBUTRIN XL) 300 mg XL tablet; Take 1 Tab by mouth every morning.  -     sertraline (ZOLOFT) 100 mg tablet; Take 1 Tab by mouth daily.             Wilfred Allison MD  1/16/2020    This note was created with the help of speech recognition software (Dragon) and may contain some 'sound alike' errors. Discussed the patient's BMI with her. The BMI follow up plan is as follows:     dietary management education, guidance, and counseling  encourage exercise  monitor weight  prescribed dietary intake    An After Visit Summary was printed and given to the patient.

## 2020-01-16 NOTE — LETTER
NOTIFICATION RETURN TO WORK / SCHOOL 
 
1/16/2020 8:17 AM 
 
Ms. Portia Rao [address] [place] [id number] To Whom It May Concern: 
 
Portia Rao is currently under the care of [address] [place] [address],[address]. She will return to work/school on: 1/17/2020 If there are questions or concerns please have the patient contact our office. Sincerely, Ingris Shook MD

## 2020-01-16 NOTE — PATIENT INSTRUCTIONS
Body Mass Index: Care Instructions Your Care Instructions Body mass index (BMI) can help you see if your weight is raising your risk for health problems. It uses a formula to compare how much you weigh with how tall you are. · A BMI lower than 18.5 is considered underweight. · A BMI between 18.5 and 24.9 is considered healthy. · A BMI between 25 and 29.9 is considered overweight. A BMI of 30 or higher is considered obese. If your BMI is in the normal range, it means that you have a lower risk for weight-related health problems. If your BMI is in the overweight or obese range, you may be at increased risk for weight-related health problems, such as high blood pressure, heart disease, stroke, arthritis or joint pain, and diabetes. If your BMI is in the underweight range, you may be at increased risk for health problems such as fatigue, lower protection (immunity) against illness, muscle loss, bone loss, hair loss, and hormone problems. BMI is just one measure of your risk for weight-related health problems. You may be at higher risk for health problems if you are not active, you eat an unhealthy diet, or you drink too much alcohol or use tobacco products. Follow-up care is a key part of your treatment and safety. Be sure to make and go to all appointments, and call your doctor if you are having problems. It's also a good idea to know your test results and keep a list of the medicines you take. How can you care for yourself at home? · Practice healthy eating habits. This includes eating plenty of fruits, vegetables, whole grains, lean protein, and low-fat dairy. · If your doctor recommends it, get more exercise. Walking is a good choice. Bit by bit, increase the amount you walk every day. Try for at least 30 minutes on most days of the week. · Do not smoke. Smoking can increase your risk for health problems.  If you need help quitting, talk to your doctor about stop-smoking programs and medicines. These can increase your chances of quitting for good. · Limit alcohol to 2 drinks a day for men and 1 drink a day for women. Too much alcohol can cause health problems. If you have a BMI higher than 25 · Your doctor may do other tests to check your risk for weight-related health problems. This may include measuring the distance around your waist. A waist measurement of more than 40 inches in men or 35 inches in women can increase the risk of weight-related health problems. · Talk with your doctor about steps you can take to stay healthy or improve your health. You may need to make lifestyle changes to lose weight and stay healthy, such as changing your diet and getting regular exercise. If you have a BMI lower than 18.5 · Your doctor may do other tests to check your risk for health problems. · Talk with your doctor about steps you can take to stay healthy or improve your health. You may need to make lifestyle changes to gain or maintain weight and stay healthy, such as getting more healthy foods in your diet and doing exercises to build muscle. Where can you learn more? Go to http://neva-parish.info/. Enter S176 in the search box to learn more about \"Body Mass Index: Care Instructions. \" Current as of: October 13, 2016 Content Version: 11.4 © 2313-7556 [employer]. Care instructions adapted under license by [employer] (which disclaims liability or warranty for this information). If you have questions about a medical condition or this instruction, always ask your healthcare professional. [address] any warranty or liability for your use of this information.

## 2020-06-08 ENCOUNTER — OFFICE VISIT (OUTPATIENT)
Dept: NEUROLOGY | Age: 55
End: 2020-06-08

## 2020-06-08 VITALS
SYSTOLIC BLOOD PRESSURE: 118 MMHG | OXYGEN SATURATION: 98 % | HEIGHT: 65 IN | HEART RATE: 76 BPM | DIASTOLIC BLOOD PRESSURE: 60 MMHG | BODY MASS INDEX: 43.6 KG/M2

## 2020-06-08 DIAGNOSIS — R42 DIZZINESS: Primary | ICD-10-CM

## 2020-06-08 DIAGNOSIS — H93.13 TINNITUS OF BOTH EARS: ICD-10-CM

## 2020-06-08 NOTE — PROGRESS NOTES
Chastity Morris is a 53 y.o. female who presents with the following  Chief Complaint   Patient presents with    Dizziness       HPI       Patient comes in for a complaint with a new onset dizziness, feeling unsteady. For the past month things have been frequent, usually on a daily basis. No new medications, health changes. No headaches. She has noticed things have gotten worse. She explains the feeling as she is feeling unbalanced, unsteady with position changes. The symptoms can come on multiple times a day. They will come on when she moves positions or turns her head or bends down and gets back up. She has not passed out. The symptoms last anywhere from seconds to longer. She has a family hx of heart issues. Allergies   Allergen Reactions    Levaquin [Levofloxacin] Rash    Pcn [Penicillins] Rash    Percocet [Oxycodone-Acetaminophen] Nausea and Vomiting     \"It upsets my stomach. \"       Current Outpatient Medications   Medication Sig    TRULICITY 9.36 JV/4.5 mL sub-q pen INJECT 0.75 MG(.05ML) UNDER THE SKIN EVERY 7 DAYS    atorvastatin (LIPITOR) 40 mg tablet Take 1 Tab by mouth daily.  buPROPion XL (WELLBUTRIN XL) 300 mg XL tablet Take 1 Tab by mouth every morning.  metFORMIN ER (GLUCOPHAGE XR) 500 mg tablet Take 2 Tabs by mouth daily (with dinner).  sertraline (ZOLOFT) 100 mg tablet Take 1 Tab by mouth daily.  atorvastatin (LIPITOR) 10 mg tablet Take 1 Tab by mouth daily.  Insulin Needles, Disposable, 31 gauge x 5/16\" ndle by SubCUTAneous route every seven (7) days.  diclofenac EC (VOLTAREN) 75 mg EC tablet Take 1 Tab by mouth two (2) times daily (after meals).  Insulin Needles, Disposable, (NOVOFINE 32) 32 gauge x 1/4\" ndle For daily use, ok to substitute generic alternative- must work with [employer]enda    ondansetron (ZOFRAN ODT) 4 mg disintegrating tablet Take 1 Tab by mouth every eight (8) hours as needed for Nausea.      No current facility-administered medications for this visit. Social History     Tobacco Use   Smoking Status Never Smoker   Smokeless Tobacco Never Used       Past Medical History:   Diagnosis Date    Depression     depression and anxiety    Headache     High cholesterol     Morbid obesity ([address]        Past Surgical History:   Procedure Laterality Date    ABDOMEN SURGERY PROC UNLISTED      Incisional hernia repair    HX  SECTION      HX DILATION AND CURETTAGE      HX DILATION AND CURETTAGE      HX GYN  1998    c section    HX GYN  1990    exploratory lap    HX GYN      curacloge    HX HEENT      wisdom teeth extraction    HX HERNIA REPAIR      HX KNEE ARTHROSCOPY      HX MENISCECTOMY      HX ORTHOPAEDIC  2013    tumor excision right 3rd finger    HX ORTHOPAEDIC  10/14    right knee arthroscopic meniscus trimming dr Lissette Pagan    HX PELVIC LAPAROSCOPY      HX TUMOR REMOVAL      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    HX WISDOM TEETH EXTRACTION         Family History   Problem Relation Age of Onset    Cancer Father         bladder and skin cancer    Elevated Lipids Father     Stroke Father         x 2    Dementia Mother        Social History     Socioeconomic History    Marital status:      Spouse name: Not on file    Number of children: Not on file    Years of education: Not on file    Highest education level: Not on file   Tobacco Use    Smoking status: Never Smoker    Smokeless tobacco: Never Used   Substance and Sexual Activity    Alcohol use: Yes     Comment: rare    Drug use: No    Sexual activity: Yes       Review of Systems   Eyes: Negative for blurred vision, double vision and photophobia. Gastrointestinal: Negative for nausea and vomiting. Musculoskeletal: Negative for falls. Neurological: Positive for dizziness. Negative for tingling, tremors, loss of consciousness and headaches. Remainder of comprehensive review is negative.      Physical Exam :    Visit Vitals  /60   Pulse 76   Ht 5' 5\" (1.651 m)   LMP 12/21/2016 (Exact Date)   SpO2 98%   BMI 43.60 kg/m²       General: Well defined, nourished, and groomed individual in no acute distress.    Neck: Supple, nontender, no bruits, no pain with resistance to active range of motion.    Heart: Regular rate and rhythm, no murmurs, rub, or gallop. Normal S1S2. Lungs: Clear to auscultation bilaterally with equal chest expansion, no cough, no wheeze  Musculoskeletal: Extremities revealed no edema and had full range of motion of joints.    Psych: Good mood and bright affect    NEUROLOGICAL EXAMINATION:    Mental Status: Alert and oriented to person, place, and time    Cranial Nerves:    II, III, IV, VI: Nystagmus with lateral gaze and across midline     Visual acuity grossly intact. Visual fields are normal.    Pupils are equal, round, and reactive to light and accommodation. Extra-ocular movements are full and fluid. Fundoscopic exam was benign, no ptosis   V-XII: Hearing is grossly intact. Facial features are symmetric, with normal sensation and strength. The palate rises symmetrically and the tongue protrudes midline. Sternocleidomastoids 5/5. Motor Examination: Normal tone, bulk, and strength, 5/5 muscle strength throughout. Coordination: Finger to nose was normal. No resting or intention tremor    Gait and Station: Steady while walking. Normal arm swing. No pronator drift. No muscle wasting or fasiculations noted. Reflexes: DTRs 2+ throughout.             Results for orders placed or performed during the hospital encounter of 64/34/08   METABOLIC PANEL, COMPREHENSIVE   Result Value Ref Range    Sodium 140 136 - 145 mmol/L    Potassium 4.6 3.5 - 5.1 mmol/L    Chloride 107 97 - 108 mmol/L    CO2 28 21 - 32 mmol/L    Anion gap 5 5 - 15 mmol/L    Glucose 93 65 - 100 mg/dL    BUN 15 6 - 20 MG/DL    Creatinine 0.74 0.55 - 1.02 MG/DL    BUN/Creatinine ratio 20 12 - 20      GFR est AA >60 >60 ml/min/1.73m2    GFR est non-AA >60 >60 ml/min/1.73m2 Calcium 9.2 8.5 - 10.1 MG/DL    Bilirubin, total 0.4 0.2 - 1.0 MG/DL    ALT (SGPT) 22 12 - 78 U/L    AST (SGOT) 13 (L) 15 - 37 U/L    Alk. phosphatase 76 45 - 117 U/L    Protein, total 7.2 6.4 - 8.2 g/dL    Albumin 4.0 3.5 - 5.0 g/dL    Globulin 3.2 2.0 - 4.0 g/dL    A-G Ratio 1.3 1.1 - 2.2     LIPID PANEL   Result Value Ref Range    LIPID PROFILE          Cholesterol, total 276 (H) <200 MG/DL    Triglyceride 130 <150 MG/DL    HDL Cholesterol 55 MG/DL    LDL, calculated 195 (H) 0 - 100 MG/DL    VLDL, calculated 26 MG/DL    CHOL/HDL Ratio 5.0 0.0 - 5.0     HEMOGLOBIN A1C WITH EAG   Result Value Ref Range    Hemoglobin A1c 5.8 (H) 4.0 - 5.6 %    Est. average glucose 120 mg/dL   MICROALBUMIN, UR, RAND W/ MICROALB/CREAT RATIO   Result Value Ref Range    Microalbumin,urine random 1.02 MG/DL    Creatinine, urine 173.00 mg/dL    Microalbumin/Creat ratio (mg/g creat) 6 0 - 30 mg/g       Orders Placed This Encounter    MRI BRAIN WO CONT     Standing Status:   Future     Standing Expiration Date:   7/8/2021   [address] [facility] [place]     Referral Priority:   Routine     Referral Type:   PT/OT/ST     Referral Reason:   Specialty Services Required     Requested Specialty:   Physical Therapy     Number of Visits Requested:   1       1. Dizziness    2. Tinnitus of both ears      MRI brain to evaluate posterior fossa, occipital lobe as causes. Looking for ischemia vs. Other. We will send to PT at to help with vestibular rehab and epiley. She does have nystagmus on exam   Orthostatics negative. 102/70, 108/70, 98/68.                   This note will not be viewable in [facility]

## 2020-06-09 DIAGNOSIS — E11.8 TYPE 2 DIABETES MELLITUS WITH COMPLICATION, WITHOUT LONG-TERM CURRENT USE OF INSULIN (HCC): ICD-10-CM

## 2020-06-09 RX ORDER — DULAGLUTIDE 0.75 MG/.5ML
0.75 INJECTION, SOLUTION SUBCUTANEOUS
Qty: 2 ML | Refills: 5 | Status: SHIPPED | OUTPATIENT
Start: 2020-06-09 | End: 2020-08-12 | Stop reason: SDUPTHER

## 2020-06-11 ENCOUNTER — HOSPITAL ENCOUNTER (OUTPATIENT)
Dept: MRI IMAGING | Age: 55
Discharge: HOME | End: 2020-06-11
Attending: NURSE PRACTITIONER
Payer: COMMERCIAL

## 2020-06-11 DIAGNOSIS — R42 DIZZINESS: ICD-10-CM

## 2020-06-11 DIAGNOSIS — H93.13 TINNITUS OF BOTH EARS: ICD-10-CM

## 2020-06-11 PROCEDURE — 70551 MRI BRAIN STEM W/O DYE: CPT

## 2020-07-20 DIAGNOSIS — F41.9 ANXIETY: Primary | ICD-10-CM

## 2020-07-20 RX ORDER — DIAZEPAM 10 MG/1
TABLET ORAL
Qty: 2 TAB | Refills: 0 | Status: SHIPPED | OUTPATIENT
Start: 2020-07-20 | End: 2020-08-18

## 2020-07-20 RX ORDER — DIAZEPAM 10 MG/1
TABLET ORAL
Qty: 2 TAB | Refills: 0 | Status: SHIPPED | OUTPATIENT
Start: 2020-07-20 | End: 2020-07-20 | Stop reason: SDUPTHER

## 2020-08-05 ENCOUNTER — EMPLOYEE WELLNESS (OUTPATIENT)
Dept: FAMILY MEDICINE CLINIC | Age: 55
End: 2020-08-05

## 2020-08-05 LAB
CHOLEST SERPL-MCNC: 155 MG/DL
GLUCOSE SERPL-MCNC: 92 MG/DL (ref 65–100)
HDLC SERPL-MCNC: 60 MG/DL
LDLC SERPL CALC-MCNC: 75 MG/DL (ref 0–100)
TRIGL SERPL-MCNC: 100 MG/DL (ref ?–150)

## 2020-08-09 DIAGNOSIS — F41.9 ANXIETY AND DEPRESSION: ICD-10-CM

## 2020-08-09 DIAGNOSIS — F32.A ANXIETY AND DEPRESSION: ICD-10-CM

## 2020-08-09 RX ORDER — SERTRALINE HYDROCHLORIDE 100 MG/1
TABLET, FILM COATED ORAL
Qty: 90 TAB | Refills: 1 | Status: SHIPPED | OUTPATIENT
Start: 2020-08-09 | End: 2020-09-10 | Stop reason: SDUPTHER

## 2020-08-12 DIAGNOSIS — E11.8 TYPE 2 DIABETES MELLITUS WITH COMPLICATION, WITHOUT LONG-TERM CURRENT USE OF INSULIN (HCC): ICD-10-CM

## 2020-08-12 DIAGNOSIS — F32.A ANXIETY AND DEPRESSION: ICD-10-CM

## 2020-08-12 DIAGNOSIS — E78.5 HYPERLIPIDEMIA, UNSPECIFIED HYPERLIPIDEMIA TYPE: ICD-10-CM

## 2020-08-12 DIAGNOSIS — F41.9 ANXIETY AND DEPRESSION: ICD-10-CM

## 2020-08-12 RX ORDER — BUPROPION HYDROCHLORIDE 300 MG/1
300 TABLET ORAL
Qty: 90 TAB | Refills: 0 | Status: SHIPPED | OUTPATIENT
Start: 2020-08-12 | End: 2020-08-13 | Stop reason: SDUPTHER

## 2020-08-12 RX ORDER — ATORVASTATIN CALCIUM 40 MG/1
40 TABLET, FILM COATED ORAL DAILY
Qty: 90 TAB | Refills: 0 | Status: SHIPPED | OUTPATIENT
Start: 2020-08-12 | End: 2020-08-13 | Stop reason: SDUPTHER

## 2020-08-12 RX ORDER — SERTRALINE HYDROCHLORIDE 100 MG/1
TABLET, FILM COATED ORAL
Qty: 90 TAB | Refills: 1 | Status: CANCELLED | OUTPATIENT
Start: 2020-08-12

## 2020-08-12 RX ORDER — METFORMIN HYDROCHLORIDE 500 MG/1
1000 TABLET, EXTENDED RELEASE ORAL
Qty: 180 TAB | Refills: 0 | Status: SHIPPED | OUTPATIENT
Start: 2020-08-12 | End: 2020-08-13 | Stop reason: SDUPTHER

## 2020-08-12 RX ORDER — DULAGLUTIDE 0.75 MG/.5ML
0.75 INJECTION, SOLUTION SUBCUTANEOUS
Qty: 2 ML | Refills: 0 | Status: SHIPPED | OUTPATIENT
Start: 2020-08-12 | End: 2020-08-13 | Stop reason: SDUPTHER

## 2020-08-13 DIAGNOSIS — E11.8 TYPE 2 DIABETES MELLITUS WITH COMPLICATION, WITHOUT LONG-TERM CURRENT USE OF INSULIN (HCC): ICD-10-CM

## 2020-08-13 DIAGNOSIS — F32.A ANXIETY AND DEPRESSION: ICD-10-CM

## 2020-08-13 DIAGNOSIS — F41.9 ANXIETY AND DEPRESSION: ICD-10-CM

## 2020-08-13 DIAGNOSIS — E78.5 HYPERLIPIDEMIA, UNSPECIFIED HYPERLIPIDEMIA TYPE: ICD-10-CM

## 2020-08-13 RX ORDER — BUPROPION HYDROCHLORIDE 300 MG/1
300 TABLET ORAL
Qty: 90 TAB | Refills: 0 | Status: SHIPPED | OUTPATIENT
Start: 2020-08-13 | End: 2020-12-01

## 2020-08-13 RX ORDER — ATORVASTATIN CALCIUM 40 MG/1
40 TABLET, FILM COATED ORAL DAILY
Qty: 90 TAB | Refills: 0 | Status: SHIPPED | OUTPATIENT
Start: 2020-08-13 | End: 2020-08-18

## 2020-08-13 RX ORDER — METFORMIN HYDROCHLORIDE 500 MG/1
1000 TABLET, EXTENDED RELEASE ORAL
Qty: 180 TAB | Refills: 0 | Status: SHIPPED | OUTPATIENT
Start: 2020-08-13 | End: 2020-08-18

## 2020-08-13 RX ORDER — DULAGLUTIDE 0.75 MG/.5ML
0.75 INJECTION, SOLUTION SUBCUTANEOUS
Qty: 2 ML | Refills: 0 | Status: SHIPPED | OUTPATIENT
Start: 2020-08-13 | End: 2020-09-24

## 2020-08-18 ENCOUNTER — OFFICE VISIT (OUTPATIENT)
Dept: INTERNAL MEDICINE CLINIC | Age: 55
End: 2020-08-18
Payer: COMMERCIAL

## 2020-08-18 VITALS
TEMPERATURE: 98.1 F | HEART RATE: 82 BPM | BODY MASS INDEX: 37.82 KG/M2 | RESPIRATION RATE: 16 BRPM | SYSTOLIC BLOOD PRESSURE: 108 MMHG | OXYGEN SATURATION: 98 % | DIASTOLIC BLOOD PRESSURE: 80 MMHG | WEIGHT: 227 LBS | HEIGHT: 65 IN

## 2020-08-18 DIAGNOSIS — E78.5 HYPERLIPIDEMIA, UNSPECIFIED HYPERLIPIDEMIA TYPE: ICD-10-CM

## 2020-08-18 DIAGNOSIS — E11.8 TYPE 2 DIABETES MELLITUS WITH COMPLICATION, WITHOUT LONG-TERM CURRENT USE OF INSULIN (HCC): Primary | ICD-10-CM

## 2020-08-18 DIAGNOSIS — E66.01 OBESITY, MORBID (HCC): ICD-10-CM

## 2020-08-18 LAB — HBA1C MFR BLD HPLC: 5.6 %

## 2020-08-18 PROCEDURE — 99214 OFFICE O/P EST MOD 30 MIN: CPT | Performed by: INTERNAL MEDICINE

## 2020-08-18 PROCEDURE — 83036 HEMOGLOBIN GLYCOSYLATED A1C: CPT | Performed by: INTERNAL MEDICINE

## 2020-08-18 RX ORDER — ATORVASTATIN CALCIUM 20 MG/1
20 TABLET, FILM COATED ORAL DAILY
Qty: 90 TAB | Refills: 1 | Status: SHIPPED | OUTPATIENT
Start: 2020-08-18 | End: 2021-01-28

## 2020-08-31 ENCOUNTER — NURSE TRIAGE (OUTPATIENT)
Dept: OTHER | Facility: CLINIC | Age: 55
End: 2020-08-31

## 2020-08-31 NOTE — TELEPHONE ENCOUNTER
Reason for Disposition   [1] COVID-19 infection suspected by caller or triager AND [2] mild symptoms (cough, fever, or others) AND [3] no complications or SOB    Answer Assessment - Initial Assessment Questions  1. COVID-19 DIAGNOSIS: \"Who made your Coronavirus (COVID-19) diagnosis? \" \"Was it confirmed by a positive lab test?\" If not diagnosed by a HCP, ask \"Are there lots of cases (community spread) where you live? \" (See [facility] health department website, if unsure)      Tested about one month ago and was negative for corona  2. ONSET: \"When did the COVID-19 symptoms start? \"       Last night  3. WORST SYMPTOM: \"What is your worst symptom? \" (e.g., cough, fever, shortness of breath, muscle aches)      Diarrhea  4. COUGH: \"Do you have a cough? \" If so, ask: \"How bad is the cough? \"        No cough  5. FEVER: \"Do you have a fever? \" If so, ask: \"What is your temperature, how was it measured, and when did it start? \"      No fever  6. RESPIRATORY STATUS: \"Describe your breathing? \" (e.g., shortness of breath, wheezing, unable to speak)       No breathing problems  7. BETTER-SAME-WORSE: Svitlana Sensor you getting better, staying the same or getting worse compared to yesterday? \"  If getting worse, ask, \"In what way? \"      same  8. HIGH RISK DISEASE: \"Do you have any chronic medical problems? \" (e.g., asthma, heart or lung disease, weak immune system, etc.)      No  9. PREGNANCY: \"Is there any chance you are pregnant? \" \"When was your last menstrual period? \"      no  10. OTHER SYMPTOMS: \"Do you have any other symptoms? \"  (e.g., chills, fatigue, headache, loss of smell or taste, muscle pain, sore throat)        diarrhea    Protocols used: CORONAVIRUS (COVID-19) DIAGNOSED OR SUSPECTED-ADULT-AH    Patient/employee c/o diarrhea x 3 during the night last night. No other symptoms. Encouraged patient to reach out to a provider and provided occupational health provider information.    Patient encouraged to reach out to her PCP and occupational health provider information provided. Associate health nurse info provided. Patient encouraged to stay in close contact with her direct supervisor. Care advice provided.

## 2020-09-10 DIAGNOSIS — F32.A ANXIETY AND DEPRESSION: ICD-10-CM

## 2020-09-10 DIAGNOSIS — F41.9 ANXIETY AND DEPRESSION: ICD-10-CM

## 2020-09-10 RX ORDER — SERTRALINE HYDROCHLORIDE 100 MG/1
TABLET, FILM COATED ORAL
Qty: 90 TAB | Refills: 1 | Status: SHIPPED | OUTPATIENT
Start: 2020-09-10 | End: 2021-01-28

## 2020-10-21 RX ORDER — METHOCARBAMOL 750 MG/1
TABLET, FILM COATED ORAL
Qty: 60 TAB | Refills: 5 | Status: SHIPPED | OUTPATIENT
Start: 2020-10-21 | End: 2021-01-28

## 2020-12-01 DIAGNOSIS — F32.A ANXIETY AND DEPRESSION: ICD-10-CM

## 2020-12-01 DIAGNOSIS — F41.9 ANXIETY AND DEPRESSION: ICD-10-CM

## 2020-12-01 RX ORDER — BUPROPION HYDROCHLORIDE 300 MG/1
TABLET ORAL
Qty: 90 TAB | Refills: 0 | Status: SHIPPED | OUTPATIENT
Start: 2020-12-01 | End: 2021-03-25 | Stop reason: SDUPTHER

## 2021-01-20 ENCOUNTER — TRANSCRIBE ORDER (OUTPATIENT)
Dept: SCHEDULING | Age: 56
End: 2021-01-20

## 2021-01-20 DIAGNOSIS — M17.11 PRIMARY OSTEOARTHRITIS OF RIGHT KNEE: Primary | ICD-10-CM

## 2021-01-28 ENCOUNTER — HOSPITAL ENCOUNTER (OUTPATIENT)
Dept: CT IMAGING | Age: 56
Discharge: HOME | End: 2021-01-28
Attending: ORTHOPAEDIC SURGERY
Payer: COMMERCIAL

## 2021-01-28 ENCOUNTER — HOSPITAL ENCOUNTER (OUTPATIENT)
Dept: PREADMISSION TESTING | Age: 56
Discharge: HOME | End: 2021-01-28
Payer: COMMERCIAL

## 2021-01-28 DIAGNOSIS — M17.11 PRIMARY OSTEOARTHRITIS OF RIGHT KNEE: ICD-10-CM

## 2021-01-28 LAB
ABO + RH BLD: NORMAL
ALBUMIN SERPL-MCNC: 3.8 G/DL (ref 3.5–5)
ALBUMIN/GLOB SERPL: 1.3 {RATIO} (ref 1.1–2.2)
ALP SERPL-CCNC: 71 U/L (ref 45–117)
ALT SERPL-CCNC: 30 U/L (ref 12–78)
ANION GAP SERPL CALC-SCNC: 7 MMOL/L (ref 5–15)
APPEARANCE UR: CLEAR
APTT PPP: 24.5 SEC (ref 22.1–31)
AST SERPL-CCNC: 18 U/L (ref 15–37)
ATRIAL RATE: 84 BPM
BACTERIA URNS QL MICRO: NEGATIVE /HPF
BASOPHILS # BLD: 0.1 K/UL (ref 0–0.1)
BASOPHILS NFR BLD: 1 % (ref 0–1)
BILIRUB SERPL-MCNC: 0.3 MG/DL (ref 0.2–1)
BILIRUB UR QL: NEGATIVE
BLOOD GROUP ANTIBODIES SERPL: NORMAL
BUN SERPL-MCNC: 21 MG/DL (ref 6–20)
BUN/CREAT SERPL: 28 (ref 12–20)
CALCIUM SERPL-MCNC: 8.7 MG/DL (ref 8.5–10.1)
CALCULATED P AXIS, ECG09: 4 DEGREES
CALCULATED R AXIS, ECG10: -7 DEGREES
CALCULATED T AXIS, ECG11: 37 DEGREES
CHLORIDE SERPL-SCNC: 109 MMOL/L (ref 97–108)
CO2 SERPL-SCNC: 26 MMOL/L (ref 21–32)
COLOR UR: ABNORMAL
CREAT SERPL-MCNC: 0.74 MG/DL (ref 0.55–1.02)
DIAGNOSIS, 93000: NORMAL
DIFFERENTIAL METHOD BLD: ABNORMAL
EOSINOPHIL # BLD: 0.4 K/UL (ref 0–0.4)
EOSINOPHIL NFR BLD: 7 % (ref 0–7)
EPITH CASTS URNS QL MICRO: ABNORMAL /LPF
ERYTHROCYTE [DISTWIDTH] IN BLOOD BY AUTOMATED COUNT: 12.9 % (ref 11.5–14.5)
EST. AVERAGE GLUCOSE BLD GHB EST-MCNC: 105 MG/DL
GLOBULIN SER CALC-MCNC: 3 G/DL (ref 2–4)
GLUCOSE SERPL-MCNC: 117 MG/DL (ref 65–100)
GLUCOSE UR STRIP.AUTO-MCNC: NEGATIVE MG/DL
HBA1C MFR BLD: 5.3 % (ref 4–5.6)
HCG SERPL QL: NEGATIVE
HCT VFR BLD AUTO: 40.6 % (ref 35–47)
HGB BLD-MCNC: 13.8 G/DL (ref 11.5–16)
HGB UR QL STRIP: NEGATIVE
IMM GRANULOCYTES # BLD AUTO: 0 K/UL (ref 0–0.04)
IMM GRANULOCYTES NFR BLD AUTO: 0 % (ref 0–0.5)
INR PPP: 1 (ref 0.9–1.1)
KETONES UR QL STRIP.AUTO: NEGATIVE MG/DL
LEUKOCYTE ESTERASE UR QL STRIP.AUTO: ABNORMAL
LYMPHOCYTES # BLD: 1.6 K/UL (ref 0.8–3.5)
LYMPHOCYTES NFR BLD: 26 % (ref 12–49)
MCH RBC QN AUTO: 30.7 PG (ref 26–34)
MCHC RBC AUTO-ENTMCNC: 34 G/DL (ref 30–36.5)
MCV RBC AUTO: 90.4 FL (ref 80–99)
MONOCYTES # BLD: 0.4 K/UL (ref 0–1)
MONOCYTES NFR BLD: 6 % (ref 5–13)
NEUTS SEG # BLD: 3.6 K/UL (ref 1.8–8)
NEUTS SEG NFR BLD: 60 % (ref 32–75)
NITRITE UR QL STRIP.AUTO: NEGATIVE
NRBC # BLD: 0 K/UL (ref 0–0.01)
NRBC BLD-RTO: 0 PER 100 WBC
P-R INTERVAL, ECG05: 142 MS
PH UR STRIP: 6.5 [PH] (ref 5–8)
PLATELET # BLD AUTO: 269 K/UL (ref 150–400)
PMV BLD AUTO: 8.8 FL (ref 8.9–12.9)
POTASSIUM SERPL-SCNC: 4.2 MMOL/L (ref 3.5–5.1)
PROT SERPL-MCNC: 6.8 G/DL (ref 6.4–8.2)
PROT UR STRIP-MCNC: NEGATIVE MG/DL
PROTHROMBIN TIME: 10.4 SEC (ref 9–11.1)
Q-T INTERVAL, ECG07: 398 MS
QRS DURATION, ECG06: 92 MS
QTC CALCULATION (BEZET), ECG08: 470 MS
RBC # BLD AUTO: 4.49 M/UL (ref 3.8–5.2)
RBC #/AREA URNS HPF: ABNORMAL /HPF (ref 0–5)
SODIUM SERPL-SCNC: 142 MMOL/L (ref 136–145)
SP GR UR REFRACTOMETRY: 1.02 (ref 1–1.03)
SPECIMEN EXP DATE BLD: NORMAL
THERAPEUTIC RANGE,PTTT: NORMAL SECS (ref 58–77)
UA: UC IF INDICATED,UAUC: ABNORMAL
UROBILINOGEN UR QL STRIP.AUTO: 0.2 EU/DL (ref 0.2–1)
VENTRICULAR RATE, ECG03: 84 BPM
WBC # BLD AUTO: 6 K/UL (ref 3.6–11)
WBC URNS QL MICRO: ABNORMAL /HPF (ref 0–4)

## 2021-01-28 PROCEDURE — 73700 CT LOWER EXTREMITY W/O DYE: CPT

## 2021-01-28 PROCEDURE — 84703 CHORIONIC GONADOTROPIN ASSAY: CPT

## 2021-01-28 PROCEDURE — 85610 PROTHROMBIN TIME: CPT

## 2021-01-28 PROCEDURE — 80053 COMPREHEN METABOLIC PANEL: CPT

## 2021-01-28 PROCEDURE — 93005 ELECTROCARDIOGRAM TRACING: CPT

## 2021-01-28 PROCEDURE — 86901 BLOOD TYPING SEROLOGIC RH(D): CPT

## 2021-01-28 PROCEDURE — 36415 COLL VENOUS BLD VENIPUNCTURE: CPT

## 2021-01-28 PROCEDURE — 85025 COMPLETE CBC W/AUTO DIFF WBC: CPT

## 2021-01-28 PROCEDURE — 85730 THROMBOPLASTIN TIME PARTIAL: CPT

## 2021-01-28 PROCEDURE — 81001 URINALYSIS AUTO W/SCOPE: CPT

## 2021-01-28 PROCEDURE — 83036 HEMOGLOBIN GLYCOSYLATED A1C: CPT

## 2021-01-28 RX ORDER — ATORVASTATIN CALCIUM 20 MG/1
20 TABLET, FILM COATED ORAL
COMMUNITY
End: 2021-04-20

## 2021-01-28 RX ORDER — DULAGLUTIDE 0.75 MG/.5ML
0.75 INJECTION, SOLUTION SUBCUTANEOUS
COMMUNITY
End: 2021-05-20 | Stop reason: DRUGHIGH

## 2021-01-28 RX ORDER — DICLOFENAC SODIUM 75 MG/1
75 TABLET, DELAYED RELEASE ORAL AS NEEDED
COMMUNITY
End: 2021-02-10

## 2021-01-28 RX ORDER — SERTRALINE HYDROCHLORIDE 100 MG/1
100 TABLET, FILM COATED ORAL
COMMUNITY
End: 2021-04-20

## 2021-01-28 RX ORDER — DICLOFENAC SODIUM 10 MG/G
2 GEL TOPICAL AS NEEDED
COMMUNITY
End: 2021-02-10

## 2021-01-28 NOTE — PERIOP NOTES
[facility], [address] [facility]   MAIN OR                                  [phone]   MAIN PRE OP                          [phone]                                                                                AMBULATORY PRE OP          [phone]  PRE-ADMISSION TESTING    [phone]   Surgery Date:  Tuesday 2/9/21     Is surgery arrival time given by surgeon? NO  If [facility] staff will call you Monday 2/8/21  between 3 and 7pm the day before your surgery with your arrival time. (If your surgery is on a Monday, we will call you the Friday before.)    Call [phone] after 7pm Monday-Friday if you did not receive this call. INSTRUCTIONS BEFORE YOUR SURGERY   When You  Arrive Arrive at the [address] [facility] on the day of your surgery  Have your insurance card, photo ID, and any copayment (if needed)   Food   and   Drink NO food or drink after midnight the night before surgery    This means NO water, gum, mints, coffee, juice, etc.  No alcohol (beer, wine, liquor) 24 hours before and after surgery   Medications to   TAKE   Morning of Surgery MEDICATIONS TO TAKE THE MORNING OF SURGERY WITH A SIP OF WATER:    wellbutrin   Medications  To  STOP      7 days before surgery  Non-Steroidal anti-inflammatory Drugs (NSAID's): for example, Ibuprofen (Advil, Motrin), Naproxen (Aleve)   Aspirin, if taking for pain    Herbal supplements, vitamins, and fish oil     Blood  Thinners  If you take  Aspirin, Plavix, Coumadin, or any blood-thinning or anti-blood clot medicine, talk to the doctor who prescribed the medications for pre-operative instructions.    Bathing Clothing  Jewelry  Valuables      MAY shower the morning of surgery, please do not apply anything to your skin (lotions, powders, deodorant, or makeup, especially mascara)   Follow Chlorhexidine Care Fusion body wash instructions provided to you during PAT appointment. Begin 3 days prior to surgery.  Do not shave or trim anywhere 24 hours before surgery   Wear your hair loose or down; no pony-tails, buns, or metal hair clips   Wear loose, comfortable, clean clothes   Wear glasses instead of contacts   Leave money, valuables, and jewelry, including body piercings, at home   Going Home - or Spending the Night  SAME-DAY SURGERY: You must have a responsible adult drive you home and stay with you 24 hours after surgery   ADMITS: If your doctor is keeping you in the hospital after surgery, leave personal belongings/luggage in your car until you have a hospital room number. Hospital discharge time is 12 noon  Drivers must be here before 12 noon unless you are told differently   Special Instructions   · Incentive spirometer given with instructions to practice at home and bring back to the hospital on the day of surgery. · Bring PTA Medication list day of surgery with the last doses taken documented   · Do not bring medication bottles the day of surgery    It is now mandated that all surgical patients be tested for COVID-19 prior to surgery. Testing has to be exactly 4 days prior to surgery. Your COVID test date is Friday 2/5/21 between 8:00 am and 11:00 am.       COVID testing will be performed curbside at the [facility] [address] entrance. There will be signs leading you to the testing site. You will need to bring a photo ID with you to be swabbed. Patients are advised to self-quarantine at home after testing and prior to your surgery date. You will be notified if your results are positive.     What to watch for:   Coronavirus (COVID-19) affects different people in different ways   It also appears with a wide range of symptoms from mild to severe   Signs usually appear 2-14 days after exposure     If you develop any of the following, notify your doctor immediately:  o Fever  o Chills, with or without a shiver  o Muscle pain  o Headache  o Sore throat  o Dry cough  o New loss of taste or smell  o Tiredness      If you develop any of the following, call 348:  o Shortness of breath  o Difficulty breathing  o Chest pain  o New confusion  o Blueness of fingers and/or lips     Follow all instructions so your surgery wont be cancelled. Please, be on time. If a situation occurs and you are delayed the day of surgery, call [phone] or [phone]. If your physical condition changes (like a fever, cold, flu, etc.) call your surgeon. Home medication(s) reviewed and verified via     LIST   VERBAL   during PAT appointment. The patient was contacted by      IN-PERSON  The patient verbalizes understanding of all instructions and      DOES NOT   need reinforcement.

## 2021-01-28 NOTE — H&P
Preoperative Evaluation                     History and Physical with Surgical Risk Stratification     1/28/2021    CC: Right knee pain  Surgery: RTK     HPI:   Sadia Howard is a 63 y.o. female referred for pre-operative evaluation by Dr. Aminah Ventura for surgery on 2/9/21. Ms. Pb Boyce states her knee pain has been present for 7 years and got worse the end of last year. She has varying degrees of pain depending on her activity. Walking and standing make her pain worse. She has swelling and buckling with a fall a few months ago when her leg gave out. She will get intermittent tingling in her right foot. 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 The patient was evaluated in the surgeon's office and it was determined that the most appropriate plan of care is to proceed with surgical intervention. Patient's PCP Katharine Coleman MD    Review of Systems     Constitutional: Negative for chills and fever  HENT: Negative for congestion and sore throat  Eyes: negative for blurred vision and double vision  Respiratory: Negative for cough, shortness of breath and wheezing  Mouth: Negative for loose, broken or chipped teeth. Cardiovascular: Negative for chest pain and palpitations  Gastrointestinal: Negative for abdominal pain, nausea, diarrhea & constipation  Genitourinary: Negative for dysuria and hematuria  Musculoskeletal: Knee pain  Skin: Negative for rash, open wounds. Neurological: Negative for dizziness, tremors and headaches  Psychiatric: The patient is not nervous/anxious.     Inherent Risk of Surgery     Surgical risk:  Intermediate  Low:  EIntermediate:  Joint Replacement   Patient Cardiac Risk Assessment     Revised Cardiac Risk Index (RCRI)    Rate if cardiac death, nonfatal MI, nonfatal cardiac arrest by number of risk factor- 0.4%    CHAPIN/AHA 2007 Guidelines:   1) Surgery Emergency, Non-cardiac -> to surgery  2) If not, look at clinical predictors    Intermediate Minor     Blood Thinner: NA    METS      EQUAL TO 4 Care for self Walk indoors around house Walk 2-3 blocks on level ground (2-3 mph) Light work around house (dust, dishes)     Other Risk Factors:   Screening for ETOH use:  Done and low risk  Smoking status:  Never     Personal or FH of bleeding problems:  No  Personal or FH of blood clots:  No  Personal or FH of anesthesia problems:   No    Pulmonary Risk:  Asthma or COPD:  No  Body mass index is 36.96 kg/m². Known BRANDON:  No    Past Medical, Surgical, Social History     Allergies: Allergies   Allergen Reactions    Levaquin [Levofloxacin] Rash    Pcn [Penicillins] Rash    Percocet [Oxycodone-Acetaminophen] Nausea and Vomiting     \"It upsets my stomach. \"       Medication Documentation Review Audit     Reviewed by Dereje Swanson RN (Registered Nurse) on 01/28/21 at 1157    Medication Sig Documenting Provider Last Dose Status Taking?   atorvastatin (Lipitor) 20 mg tablet Take 20 mg by mouth nightly. Provider, Historical  Active Yes   buPROPion XL (WELLBUTRIN XL) 300 mg XL tablet TAKE ONE TABLET BY MOUTH EVERY MORNING April Vigil MD  Active Yes   diclofenac (VOLTAREN) 1 % gel Apply 2 g to affected area as needed for Pain. Provider, Historical  Active Yes   diclofenac EC (VOLTAREN) 75 mg EC tablet Take 75 mg by mouth as needed for Pain. Provider, Historical  Active Yes   elderberry fruit (ELDERBERRY PO) Take 1 Tab by mouth daily. Provider, Historical  Active Yes   MAGNESIUM OXIDE PO Take 1 Tab by mouth daily. Provider, Historical  Active Yes   methocarbamoL (ROBAXIN) 750 mg tablet Take 1-2 tablets by mouth TID PRN Leah Milian NP  Active    ondansetron (ZOFRAN ODT) 4 mg disintegrating tablet Take 1 Tab by mouth every eight (8) hours as needed for Nausea. Makayla Parson MD  Active    sertraline (Zoloft) 100 mg tablet Take 100 mg by mouth nightly.  Provider, Historical  Active Yes   Trulicity 1.74 SS/5.5 mL sub-q pen INJECT THE CONTENTS OF ONE Alton Goss MD  Active Yes                Past Medical History:   Diagnosis Date    Anxiety and depression     Arthritis     Headache     High cholesterol      Past Surgical History:   Procedure Laterality Date    HX  SECTION      HX DILATION AND CURETTAGE      HX GYN      curacloge x2    HX HERNIA REPAIR      HX ORTHOPAEDIC  2013    tumor excision right 3rd finger    HX ORTHOPAEDIC  10/14    right knee arthroscopic meniscus trimming dr Junior Ojeda    HX PELVIC LAPAROSCOPY      HX TUMOR REMOVAL      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    HX WISDOM TEETH EXTRACTION       Social History     Tobacco Use    Smoking status: Never Smoker    Smokeless tobacco: Never Used   Substance Use Topics    Alcohol use: Yes     Comment: rare    Drug use: No     Family History   Problem Relation Age of Onset    Cancer Father         bladder and skin cancer    Elevated Lipids Father     Stroke Father         x 2    Dementia Mother     Anesth Problems Neg Hx     Clotting Disorder Neg Hx        Objective     Vitals:    21 1208   Weight: 103.9 kg (229 lb)   Height: 5' 6\" (1.676 m)       Constitutional:  Appears well,  No Acute Distress, Vitals noted  Psychiatric:   Affect normal, Alert and Oriented to person/place/time    Eyes:   Pupils equally round and reactive, EOMI, conjunctiva clear, eyelids normal  ENT:   External ears and nose normal, teeth normal, gums normal, TMs and Orophyarynx normal  Neck:   General inspection and Thyroid normal.  No abnormal cervical or supraclavicular nodes    Lungs:   Clear to auscultation, good respiratory effort  Heart: Ausculation normal.  Regular rhythm. No cardiac murmurs.   No carotid bruits or palpable thrills  Chest wall normal  Musculoskeletal: Gait analgic  Extremities:   Without edema, good peripheral pulses  Skin:   Warm to palpation, without rashes, bruising, or suspicious lesions     Recent Results (from the past 72 hour(s))   CBC WITH AUTOMATED DIFF    Collection Time: 01/28/21 11:46 AM   Result Value Ref Range    WBC 6.0 3.6 - 11.0 K/uL    RBC 4.49 3.80 - 5.20 M/uL    HGB 13.8 11.5 - 16.0 g/dL    HCT 40.6 35.0 - 47.0 %    MCV 90.4 80.0 - 99.0 FL    MCH 30.7 26.0 - 34.0 PG    MCHC 34.0 30.0 - 36.5 g/dL    RDW 12.9 11.5 - 14.5 %    PLATELET 771 141 - 407 K/uL    MPV 8.8 (L) 8.9 - 12.9 FL    NRBC 0.0 0  WBC    ABSOLUTE NRBC 0.00 0.00 - 0.01 K/uL    NEUTROPHILS 60 32 - 75 %    LYMPHOCYTES 26 12 - 49 %    MONOCYTES 6 5 - 13 %    EOSINOPHILS 7 0 - 7 %    BASOPHILS 1 0 - 1 %    IMMATURE GRANULOCYTES 0 0.0 - 0.5 %    ABS. NEUTROPHILS 3.6 1.8 - 8.0 K/UL    ABS. LYMPHOCYTES 1.6 0.8 - 3.5 K/UL    ABS. MONOCYTES 0.4 0.0 - 1.0 K/UL    ABS. EOSINOPHILS 0.4 0.0 - 0.4 K/UL    ABS. BASOPHILS 0.1 0.0 - 0.1 K/UL    ABS. IMM. GRANS. 0.0 0.00 - 0.04 K/UL    DF AUTOMATED     METABOLIC PANEL, COMPREHENSIVE    Collection Time: 01/28/21 11:46 AM   Result Value Ref Range    Sodium 142 136 - 145 mmol/L    Potassium 4.2 3.5 - 5.1 mmol/L    Chloride 109 (H) 97 - 108 mmol/L    CO2 26 21 - 32 mmol/L    Anion gap 7 5 - 15 mmol/L    Glucose 117 (H) 65 - 100 mg/dL    BUN 21 (H) 6 - 20 MG/DL    Creatinine 0.74 0.55 - 1.02 MG/DL    BUN/Creatinine ratio 28 (H) 12 - 20      GFR est AA >60 >60 ml/min/1.73m2    GFR est non-AA >60 >60 ml/min/1.73m2    Calcium 8.7 8.5 - 10.1 MG/DL    Bilirubin, total 0.3 0.2 - 1.0 MG/DL    ALT (SGPT) 30 12 - 78 U/L    AST (SGOT) 18 15 - 37 U/L    Alk.  phosphatase 71 45 - 117 U/L    Protein, total 6.8 6.4 - 8.2 g/dL    Albumin 3.8 3.5 - 5.0 g/dL    Globulin 3.0 2.0 - 4.0 g/dL    A-G Ratio 1.3 1.1 - 2.2     HCG QL SERUM    Collection Time: 01/28/21 11:46 AM   Result Value Ref Range    HCG, Ql. Negative NEG     HEMOGLOBIN A1C WITH EAG    Collection Time: 01/28/21 11:46 AM   Result Value Ref Range    Hemoglobin A1c 5.3 4.0 - 5.6 %    Est. average glucose 105 mg/dL   CULTURE, MRSA    Collection Time: 01/28/21 11:46 AM    Specimen: Nares; Nasal   Result Value Ref Range    Special Requests: NO SPECIAL REQUESTS      Culture result: MRSA NOT PRESENT AT 17 HOURS     PROTHROMBIN TIME + INR    Collection Time: 01/28/21 11:46 AM   Result Value Ref Range    INR 1.0 0.9 - 1.1      Prothrombin time 10.4 9.0 - 11.1 sec   PTT    Collection Time: 01/28/21 11:46 AM   Result Value Ref Range    aPTT 24.5 22.1 - 31.0 sec    aPTT, therapeutic range     58.0 - 77.0 SECS   URINALYSIS W/ REFLEX CULTURE    Collection Time: 01/28/21 11:46 AM    Specimen: Urine   Result Value Ref Range    Color YELLOW/STRAW      Appearance CLEAR CLEAR      Specific gravity 1.024 1.003 - 1.030      pH (UA) 6.5 5.0 - 8.0      Protein Negative NEG mg/dL    Glucose Negative NEG mg/dL    Ketone Negative NEG mg/dL    Bilirubin Negative NEG      Blood Negative NEG      Urobilinogen 0.2 0.2 - 1.0 EU/dL    Nitrites Negative NEG      Leukocyte Esterase SMALL (A) NEG      WBC 0-4 0 - 4 /hpf    RBC 0-5 0 - 5 /hpf    Epithelial cells FEW FEW /lpf    Bacteria Negative NEG /hpf    UA:UC IF INDICATED CULTURE NOT INDICATED BY UA RESULT CNI     TYPE & SCREEN    Collection Time: 01/28/21 11:46 AM   Result Value Ref Range    Crossmatch Expiration 02/11/2021,2359     ABO/Rh(D) A POSITIVE     Antibody screen NEG    EKG, 12 LEAD, INITIAL    Collection Time: 01/28/21 12:39 PM   Result Value Ref Range    Ventricular Rate 84 BPM    Atrial Rate 84 BPM    P-R Interval 142 ms    QRS Duration 92 ms    Q-T Interval 398 ms    QTC Calculation (Bezet) 470 ms    Calculated P Axis 4 degrees    Calculated R Axis -7 degrees    Calculated T Axis 37 degrees    Diagnosis       Normal sinus rhythm  Poor R-wave Progression  Otherwise normal ECG  When compared with ECG of 10-OCT-2018 16:54,  No significant change was found  Confirmed by Daryle Buoy MD, JADE ([id number]) on 1/28/2021 4:51:48 PM         Assessment and Plan     Assessment/Plan:   1) OA Right Knee  2) Pre-Operative Evaluation    Labs and EKG reviewed.  MRSA pending    Preoperative Clearance  Per RCRI, the patient has a 0.4% risk of cardiac death, nonfatal MI, nonfatal cardiac arrest based on no risk factors. Per ACC/AHA guidelines, patient is low risk for a(n) intermediate risk surgery and may proceed to planned surgery with the above noted risk.     Julee Branch NP

## 2021-01-28 NOTE — H&P (VIEW-ONLY)
Preoperative Evaluation History and Physical with Surgical Risk Stratification 1/28/2021 CC: Right knee pain Surgery: RTK  
 
HPI:  
Noe Melvin is a 63 y.o. female referred for pre-operative evaluation by Dr. Aurea Gutierrez for surgery on 2/9/21. Ms. Arnulfo James states her knee pain has been present for 7 years and got worse the end of last year. She has varying degrees of pain depending on her activity. Walking and standing make her pain worse. She has swelling and buckling with a fall a few months ago when her leg gave out. She will get intermittent tingling in her right foot. 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 The patient was evaluated in the surgeon's office and it was determined that the most appropriate plan of care is to proceed with surgical intervention. Patient's PCP Yani Muñoz MD 
 
Review of Systems Constitutional: Negative for chills and fever HENT: Negative for congestion and sore throat Eyes: negative for blurred vision and double vision Respiratory: Negative for cough, shortness of breath and wheezing Mouth: Negative for loose, broken or chipped teeth. Cardiovascular: Negative for chest pain and palpitations Gastrointestinal: Negative for abdominal pain, nausea, diarrhea & constipation Genitourinary: Negative for dysuria and hematuria Musculoskeletal: Knee pain Skin: Negative for rash, open wounds. Neurological: Negative for dizziness, tremors and headaches Psychiatric: The patient is not nervous/anxious. Inherent Risk of Surgery Surgical risk:  Intermediate Low:  EIntermediate:  Joint Replacement Patient Cardiac Risk Assessment Revised Cardiac Risk Index (RCRI) Rate if cardiac death, nonfatal MI, nonfatal cardiac arrest by number of risk factor- 0.4% CHAPIN/AHA 2007 Guidelines:  
1) Surgery Emergency, Non-cardiac -> to surgery 2) If not, look at clinical predictors Intermediate Minor Blood Thinner: NA 
 
METS  
 
 EQUAL TO 4 Care for self Walk indoors around house Walk 2-3 blocks on level ground (2-3 mph) Light work around house (dust, dishes) Other Risk Factors:  
Screening for ETOH use:  Done and low risk Smoking status:  Never Personal or FH of bleeding problems:  No 
Personal or FH of blood clots:  No 
Personal or FH of anesthesia problems:   No 
 
Pulmonary Risk: 
Asthma or COPD:  No 
Body mass index is 36.96 kg/m². Known BRANDON:  No 
 
Past Medical, Surgical, Social History Allergies: Allergies Allergen Reactions  Levaquin [Levofloxacin] Rash  Pcn [Penicillins] Rash  Percocet [Oxycodone-Acetaminophen] Nausea and Vomiting \"It upsets my stomach. \"  
 
 
Medication Documentation Review Audit Reviewed by Estefani Vega RN (Registered Nurse) on 01/28/21 at 5318 Medication Sig Documenting Provider Last Dose Status Taking?  
atorvastatin (Lipitor) 20 mg tablet Take 20 mg by mouth nightly. Provider, Historical  Active Yes  
buPROPion XL (WELLBUTRIN XL) 300 mg XL tablet TAKE ONE TABLET BY MOUTH EVERY MORNING Dianne Vigil MD  Active Yes  
diclofenac (VOLTAREN) 1 % gel Apply 2 g to affected area as needed for Pain. Provider, Historical  Active Yes  
diclofenac EC (VOLTAREN) 75 mg EC tablet Take 75 mg by mouth as needed for Pain. Provider, Historical  Active Yes  
elderberry fruit (ELDERBERRY PO) Take 1 Tab by mouth daily. Provider, Historical  Active Yes MAGNESIUM OXIDE PO Take 1 Tab by mouth daily. Provider, Historical  Active Yes  
methocarbamoL (ROBAXIN) 750 mg tablet Take 1-2 tablets by mouth TID PRN Pierre Milian NP  Active   
ondansetron (ZOFRAN ODT) 4 mg disintegrating tablet Take 1 Tab by mouth every eight (8) hours as needed for Nausea. Rosalina Browne MD  Active   
sertraline (Zoloft) 100 mg tablet Take 100 mg by mouth nightly. Provider, Historical  Active Yes Trulicity 4.15 MV/5.6 mL sub-q pen Celeste Hodges MD  Active Yes Past Medical History:  
Diagnosis Date  Anxiety and depression  Arthritis  Headache  High cholesterol Past Surgical History:  
Procedure Laterality Date [facility] [place]  HX DILATION AND CURETTAGE  1991  
 HX GYN    
 curacloge x2  HX HERNIA REPAIR    
 HX ORTHOPAEDIC  2013  
 tumor excision right 3rd finger  HX ORTHOPAEDIC  10/14  
 right knee arthroscopic meniscus trimming dr Mode Heck  HX PELVIC LAPAROSCOPY    
 HX TUMOR REMOVAL    
 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  
 
Family History Problem Relation Age of Onset  Cancer Father   
     bladder and skin cancer  Elevated Lipids Father  Stroke Father   
     x 2  
 Dementia Mother  Anesth Problems Neg Hx  Clotting Disorder Neg Hx Objective Vitals:  
 01/29/21 1208 Weight: 103.9 kg (229 lb) Height: 5' 6\" (1.676 m) Constitutional:  Appears well,  No Acute Distress, Vitals noted Psychiatric:   Affect normal, Alert and Oriented to person/place/time Eyes:   Pupils equally round and reactive, EOMI, conjunctiva clear, eyelids normal 
ENT:   External ears and nose normal, teeth normal, gums normal, TMs and Orophyarynx normal 
Neck:   General inspection and Thyroid normal.  No abnormal cervical or supraclavicular nodes Lungs:   Clear to auscultation, good respiratory effort Heart: Ausculation normal.  Regular rhythm. No cardiac murmurs. No carotid bruits or palpable thrills Chest wall normal 
Musculoskeletal: Gait analgic Extremities:   Without edema, good peripheral pulses Skin:   Warm to palpation, without rashes, bruising, or suspicious lesions Recent Results (from the past 72 hour(s)) CBC WITH AUTOMATED DIFF Collection Time: 01/28/21 11:46 AM  
Result Value Ref Range WBC 6.0 3.6 - 11.0 K/uL  
 RBC 4.49 3.80 - 5.20 M/uL  
 HGB 13.8 11.5 - 16.0 g/dL HCT 40.6 35.0 - 47.0 % MCV 90.4 80.0 - 99.0 FL  
 MCH 30.7 26.0 - 34.0 PG  
 MCHC 34.0 30.0 - 36.5 g/dL  
 RDW 12.9 11.5 - 14.5 % PLATELET 774 535 - 809 K/uL MPV 8.8 (L) 8.9 - 12.9 FL  
 NRBC 0.0 0  WBC ABSOLUTE NRBC 0.00 0.00 - 0.01 K/uL NEUTROPHILS 60 32 - 75 % LYMPHOCYTES 26 12 - 49 % MONOCYTES 6 5 - 13 % EOSINOPHILS 7 0 - 7 % BASOPHILS 1 0 - 1 % IMMATURE GRANULOCYTES 0 0.0 - 0.5 % ABS. NEUTROPHILS 3.6 1.8 - 8.0 K/UL  
 ABS. LYMPHOCYTES 1.6 0.8 - 3.5 K/UL  
 ABS. MONOCYTES 0.4 0.0 - 1.0 K/UL  
 ABS. EOSINOPHILS 0.4 0.0 - 0.4 K/UL  
 ABS. BASOPHILS 0.1 0.0 - 0.1 K/UL  
 ABS. IMM. GRANS. 0.0 0.00 - 0.04 K/UL  
 DF AUTOMATED METABOLIC PANEL, COMPREHENSIVE Collection Time: 01/28/21 11:46 AM  
Result Value Ref Range Sodium 142 136 - 145 mmol/L Potassium 4.2 3.5 - 5.1 mmol/L Chloride 109 (H) 97 - 108 mmol/L  
 CO2 26 21 - 32 mmol/L Anion gap 7 5 - 15 mmol/L Glucose 117 (H) 65 - 100 mg/dL BUN 21 (H) 6 - 20 MG/DL Creatinine 0.74 0.55 - 1.02 MG/DL  
 BUN/Creatinine ratio 28 (H) 12 - 20 GFR est AA >60 >60 ml/min/1.73m2 GFR est non-AA >60 >60 ml/min/1.73m2 Calcium 8.7 8.5 - 10.1 MG/DL Bilirubin, total 0.3 0.2 - 1.0 MG/DL  
 ALT (SGPT) 30 12 - 78 U/L  
 AST (SGOT) 18 15 - 37 U/L Alk. phosphatase 71 45 - 117 U/L Protein, total 6.8 6.4 - 8.2 g/dL Albumin 3.8 3.5 - 5.0 g/dL Globulin 3.0 2.0 - 4.0 g/dL A-G Ratio 1.3 1.1 - 2.2 HCG QL SERUM Collection Time: 01/28/21 11:46 AM  
Result Value Ref Range HCG, Ql. Negative NEG    
HEMOGLOBIN A1C WITH EAG Collection Time: 01/28/21 11:46 AM  
Result Value Ref Range Hemoglobin A1c 5.3 4.0 - 5.6 % Est. average glucose 105 mg/dL CULTURE, MRSA Collection Time: 01/28/21 11:46 AM  
 Specimen: Nares; Nasal  
Result Value Ref Range Special Requests: NO SPECIAL REQUESTS Culture result: MRSA NOT PRESENT AT 17 HOURS    
PROTHROMBIN TIME + INR Collection Time: 01/28/21 11:46 AM  
Result Value Ref Range INR 1.0 0.9 - 1.1 Prothrombin time 10.4 9.0 - 11.1 sec PTT Collection Time: 01/28/21 11:46 AM  
Result Value Ref Range aPTT 24.5 22.1 - 31.0 sec  
 aPTT, therapeutic range     58.0 - 77.0 SECS  
URINALYSIS W/ REFLEX CULTURE Collection Time: 01/28/21 11:46 AM  
 Specimen: Urine Result Value Ref Range Color YELLOW/STRAW Appearance CLEAR CLEAR Specific gravity 1.024 1.003 - 1.030    
 pH (UA) 6.5 5.0 - 8.0 Protein Negative NEG mg/dL Glucose Negative NEG mg/dL Ketone Negative NEG mg/dL Bilirubin Negative NEG Blood Negative NEG Urobilinogen 0.2 0.2 - 1.0 EU/dL Nitrites Negative NEG Leukocyte Esterase SMALL (A) NEG    
 WBC 0-4 0 - 4 /hpf  
 RBC 0-5 0 - 5 /hpf Epithelial cells FEW FEW /lpf Bacteria Negative NEG /hpf  
 UA:UC IF INDICATED CULTURE NOT INDICATED BY UA RESULT CNI    
TYPE & SCREEN Collection Time: 01/28/21 11:46 AM  
Result Value Ref Range Crossmatch Expiration 02/11/2021,2359 ABO/Rh(D) A POSITIVE Antibody screen NEG   
EKG, 12 LEAD, INITIAL Collection Time: 01/28/21 12:39 PM  
Result Value Ref Range Ventricular Rate 84 BPM  
 Atrial Rate 84 BPM  
 P-R Interval 142 ms QRS Duration 92 ms Q-T Interval 398 ms QTC Calculation (Bezet) 470 ms Calculated P Axis 4 degrees Calculated R Axis -7 degrees Calculated T Axis 37 degrees Diagnosis Normal sinus rhythm Poor R-wave Progression Otherwise normal ECG When compared with ECG of 10-OCT-2018 16:54, No significant change was found Confirmed by Xiang Spivey MD, [id number] ([id number]) on 1/28/2021 4:51:48 PM 
  
 
 
 Assessment and Plan Assessment/Plan:  
1) OA Right Knee 2) Pre-Operative Evaluation Labs and EKG reviewed. MRSA pending Preoperative Clearance Per RCRI, the patient has a 0.4% risk of cardiac death, nonfatal MI, nonfatal cardiac arrest based on no risk factors. Per ACC/AHA guidelines, patient is low risk for a(n) intermediate risk surgery and may proceed to planned surgery with the above noted risk.  
 
Juan Jack NP

## 2021-01-29 VITALS — HEIGHT: 66 IN | BODY MASS INDEX: 36.8 KG/M2 | WEIGHT: 229 LBS

## 2021-01-29 LAB
BACTERIA SPEC CULT: NORMAL
BACTERIA SPEC CULT: NORMAL
SERVICE CMNT-IMP: NORMAL

## 2021-02-05 ENCOUNTER — HOSPITAL ENCOUNTER (OUTPATIENT)
Dept: PREADMISSION TESTING | Age: 56
Discharge: HOME | End: 2021-02-05
Payer: COMMERCIAL

## 2021-02-05 PROCEDURE — U0003 INFECTIOUS AGENT DETECTION BY NUCLEIC ACID (DNA OR RNA); SEVERE ACUTE RESPIRATORY SYNDROME CORONAVIRUS 2 (SARS-COV-2) (CORONAVIRUS DISEASE [COVID-19]), AMPLIFIED PROBE TECHNIQUE, MAKING USE OF HIGH THROUGHPUT TECHNOLOGIES AS DESCRIBED BY CMS-2020-01-R: HCPCS

## 2021-02-06 LAB — SARS-COV-2, COV2NT: NOT DETECTED

## 2021-02-08 ENCOUNTER — ANESTHESIA EVENT (OUTPATIENT)
Dept: SURGERY | Age: 56
End: 2021-02-08
Payer: COMMERCIAL

## 2021-02-08 NOTE — PERIOP NOTES
The orders for surgery state that the [facility] device will be used during the surgery, but the surgical posting does not reflect this. Left a VM for Tawana at Dr. Misty Westfall office requesting clarification of this. Requested that she call surgical posting to have \"kaden\" added to the surgical posting if the device is going to be used.   DOS: 2/9/2021

## 2021-02-09 ENCOUNTER — ANESTHESIA (OUTPATIENT)
Dept: SURGERY | Age: 56
End: 2021-02-09
Payer: COMMERCIAL

## 2021-02-09 ENCOUNTER — HOSPITAL ENCOUNTER (OUTPATIENT)
Age: 56
Setting detail: OBSERVATION
Discharge: HOME | End: 2021-02-10
Attending: ORTHOPAEDIC SURGERY | Admitting: ORTHOPAEDIC SURGERY
Payer: COMMERCIAL

## 2021-02-09 ENCOUNTER — APPOINTMENT (OUTPATIENT)
Dept: GENERAL RADIOLOGY | Age: 56
End: 2021-02-09
Attending: STUDENT IN AN ORGANIZED HEALTH CARE EDUCATION/TRAINING PROGRAM
Payer: COMMERCIAL

## 2021-02-09 DIAGNOSIS — Z96.652 HISTORY OF TOTAL LEFT KNEE REPLACEMENT: Primary | ICD-10-CM

## 2021-02-09 PROBLEM — M17.11 PRIMARY OSTEOARTHRITIS OF RIGHT KNEE: Status: ACTIVE | Noted: 2021-02-09

## 2021-02-09 LAB
GLUCOSE BLD STRIP.AUTO-MCNC: 102 MG/DL (ref 65–100)
GLUCOSE BLD STRIP.AUTO-MCNC: 103 MG/DL (ref 65–100)
GLUCOSE BLD STRIP.AUTO-MCNC: 131 MG/DL (ref 65–100)
GLUCOSE BLD STRIP.AUTO-MCNC: 189 MG/DL (ref 65–100)
SERVICE CMNT-IMP: ABNORMAL

## 2021-02-09 PROCEDURE — 74011000250 HC RX REV CODE- 250: Performed by: ORTHOPAEDIC SURGERY

## 2021-02-09 PROCEDURE — 2709999900 HC NON-CHARGEABLE SUPPLY: Performed by: ORTHOPAEDIC SURGERY

## 2021-02-09 PROCEDURE — C1776 JOINT DEVICE (IMPLANTABLE): HCPCS | Performed by: ORTHOPAEDIC SURGERY

## 2021-02-09 PROCEDURE — 64447 NJX AA&/STRD FEMORAL NRV IMG: CPT

## 2021-02-09 PROCEDURE — 77030002933 HC SUT MCRYL J&J -A: Performed by: ORTHOPAEDIC SURGERY

## 2021-02-09 PROCEDURE — 99218 HC RM OBSERVATION: CPT

## 2021-02-09 PROCEDURE — 77030038149 HC BLD SAW SAG STRY -D: Performed by: ORTHOPAEDIC SURGERY

## 2021-02-09 PROCEDURE — 74011000272 HC RX REV CODE- 272: Performed by: ORTHOPAEDIC SURGERY

## 2021-02-09 PROCEDURE — 65270000029 HC RM PRIVATE

## 2021-02-09 PROCEDURE — 74011000250 HC RX REV CODE- 250: Performed by: NURSE ANESTHETIST, CERTIFIED REGISTERED

## 2021-02-09 PROCEDURE — 74011000250 HC RX REV CODE- 250

## 2021-02-09 PROCEDURE — 97116 GAIT TRAINING THERAPY: CPT

## 2021-02-09 PROCEDURE — 76210000032 HC AMBSU PH I REC 3 TO 3.5 HR: Performed by: ORTHOPAEDIC SURGERY

## 2021-02-09 PROCEDURE — 74011000258 HC RX REV CODE- 258: Performed by: NURSE ANESTHETIST, CERTIFIED REGISTERED

## 2021-02-09 PROCEDURE — 74011250636 HC RX REV CODE- 250/636: Performed by: STUDENT IN AN ORGANIZED HEALTH CARE EDUCATION/TRAINING PROGRAM

## 2021-02-09 PROCEDURE — 73560 X-RAY EXAM OF KNEE 1 OR 2: CPT

## 2021-02-09 PROCEDURE — 74011250636 HC RX REV CODE- 250/636: Performed by: NURSE ANESTHETIST, CERTIFIED REGISTERED

## 2021-02-09 PROCEDURE — 77030029820: Performed by: ORTHOPAEDIC SURGERY

## 2021-02-09 PROCEDURE — 77030040922 HC BLNKT HYPOTHRM STRY -A

## 2021-02-09 PROCEDURE — 76030000023 HC AMB SURG 3 TO 3.5 HR INTENSV-TIER 1: Performed by: ORTHOPAEDIC SURGERY

## 2021-02-09 PROCEDURE — 77030006835 HC BLD SAW SAG STRY -B: Performed by: ORTHOPAEDIC SURGERY

## 2021-02-09 PROCEDURE — 74011250637 HC RX REV CODE- 250/637: Performed by: STUDENT IN AN ORGANIZED HEALTH CARE EDUCATION/TRAINING PROGRAM

## 2021-02-09 PROCEDURE — 76060000066 HC AMB SURG ANES 3 TO 3.5 HR: Performed by: ORTHOPAEDIC SURGERY

## 2021-02-09 PROCEDURE — 74011250636 HC RX REV CODE- 250/636

## 2021-02-09 PROCEDURE — 82962 GLUCOSE BLOOD TEST: CPT

## 2021-02-09 PROCEDURE — 74011250636 HC RX REV CODE- 250/636: Performed by: ANESTHESIOLOGY

## 2021-02-09 PROCEDURE — 77030041680 HC PNCL ELECSURG SMK EVAC CNMD -B: Performed by: ORTHOPAEDIC SURGERY

## 2021-02-09 PROCEDURE — 77030031139 HC SUT VCRL2 J&J -A: Performed by: ORTHOPAEDIC SURGERY

## 2021-02-09 PROCEDURE — 77030029829 HC TIB INST CKPNT DISP STRY -B: Performed by: ORTHOPAEDIC SURGERY

## 2021-02-09 PROCEDURE — 77030003601 HC NDL NRV BLK BBMI -A

## 2021-02-09 PROCEDURE — 77030000032 HC CUF TRNQT ZIMM -B: Performed by: ORTHOPAEDIC SURGERY

## 2021-02-09 PROCEDURE — 74011250637 HC RX REV CODE- 250/637: Performed by: ANESTHESIOLOGY

## 2021-02-09 PROCEDURE — 77030041075 HC DRSG AG OPTIFRM MDII -B: Performed by: ORTHOPAEDIC SURGERY

## 2021-02-09 PROCEDURE — 77030029830 HC FEM INST CKPNT DISP STRY -B: Performed by: ORTHOPAEDIC SURGERY

## 2021-02-09 PROCEDURE — 77030018673: Performed by: ORTHOPAEDIC SURGERY

## 2021-02-09 PROCEDURE — C1713 ANCHOR/SCREW BN/BN,TIS/BN: HCPCS | Performed by: ORTHOPAEDIC SURGERY

## 2021-02-09 PROCEDURE — 74011000250 HC RX REV CODE- 250: Performed by: STUDENT IN AN ORGANIZED HEALTH CARE EDUCATION/TRAINING PROGRAM

## 2021-02-09 PROCEDURE — 77030033067 HC SUT PDO STRATFX SPIR J&J -B: Performed by: ORTHOPAEDIC SURGERY

## 2021-02-09 PROCEDURE — 97161 PT EVAL LOW COMPLEX 20 MIN: CPT

## 2021-02-09 PROCEDURE — 74011250636 HC RX REV CODE- 250/636: Performed by: ORTHOPAEDIC SURGERY

## 2021-02-09 PROCEDURE — 74011250637 HC RX REV CODE- 250/637: Performed by: NURSE PRACTITIONER

## 2021-02-09 PROCEDURE — 77030028907 HC WRP KNEE WO BGS SOLM -B

## 2021-02-09 PROCEDURE — 77030040361 HC SLV COMPR DVT MDII -B

## 2021-02-09 DEVICE — TIBIAL BEARING INSERT
Type: IMPLANTABLE DEVICE | Site: KNEE | Status: FUNCTIONAL
Brand: TRIATHLON

## 2021-02-09 DEVICE — CRUCIATE RETAINING FEMORAL
Type: IMPLANTABLE DEVICE | Site: KNEE | Status: FUNCTIONAL
Brand: TRIATHLON

## 2021-02-09 DEVICE — TIBIAL COMPONENT
Type: IMPLANTABLE DEVICE | Site: KNEE | Status: FUNCTIONAL
Brand: TRIATHLON

## 2021-02-09 DEVICE — KNEE K2 TOT HEMI ADV CMTLS -- IMPL CAPPED K2: Type: IMPLANTABLE DEVICE | Status: FUNCTIONAL

## 2021-02-09 DEVICE — PATELLA
Type: IMPLANTABLE DEVICE | Site: KNEE | Status: FUNCTIONAL
Brand: TRIATHLON

## 2021-02-09 RX ORDER — OXYCODONE HYDROCHLORIDE 5 MG/1
10 TABLET ORAL
Status: DISCONTINUED | OUTPATIENT
Start: 2021-02-09 | End: 2021-02-09 | Stop reason: HOSPADM

## 2021-02-09 RX ORDER — ACETAMINOPHEN 325 MG/1
975 TABLET ORAL ONCE
Status: COMPLETED | OUTPATIENT
Start: 2021-02-09 | End: 2021-02-09

## 2021-02-09 RX ORDER — BUPIVACAINE HYDROCHLORIDE 5 MG/ML
INJECTION, SOLUTION EPIDURAL; INTRACAUDAL AS NEEDED
Status: DISCONTINUED | OUTPATIENT
Start: 2021-02-09 | End: 2021-02-09 | Stop reason: HOSPADM

## 2021-02-09 RX ORDER — FENTANYL CITRATE 50 UG/ML
INJECTION, SOLUTION INTRAMUSCULAR; INTRAVENOUS AS NEEDED
Status: DISCONTINUED | OUTPATIENT
Start: 2021-02-09 | End: 2021-02-09 | Stop reason: HOSPADM

## 2021-02-09 RX ORDER — ASPIRIN 81 MG/1
81 TABLET ORAL 2 TIMES DAILY
Status: DISCONTINUED | OUTPATIENT
Start: 2021-02-09 | End: 2021-02-10 | Stop reason: HOSPADM

## 2021-02-09 RX ORDER — NALOXONE HYDROCHLORIDE 0.4 MG/ML
0.4 INJECTION, SOLUTION INTRAMUSCULAR; INTRAVENOUS; SUBCUTANEOUS AS NEEDED
Status: DISCONTINUED | OUTPATIENT
Start: 2021-02-09 | End: 2021-02-10 | Stop reason: HOSPADM

## 2021-02-09 RX ORDER — ONDANSETRON 2 MG/ML
4 INJECTION INTRAMUSCULAR; INTRAVENOUS
Status: DISPENSED | OUTPATIENT
Start: 2021-02-09 | End: 2021-02-10

## 2021-02-09 RX ORDER — AMOXICILLIN 250 MG
1 CAPSULE ORAL 2 TIMES DAILY
Status: DISCONTINUED | OUTPATIENT
Start: 2021-02-09 | End: 2021-02-10 | Stop reason: HOSPADM

## 2021-02-09 RX ORDER — CELECOXIB 100 MG/1
100 CAPSULE ORAL ONCE
Status: COMPLETED | OUTPATIENT
Start: 2021-02-09 | End: 2021-02-09

## 2021-02-09 RX ORDER — VANCOMYCIN HYDROCHLORIDE 1 G/20ML
INJECTION, POWDER, LYOPHILIZED, FOR SOLUTION INTRAVENOUS AS NEEDED
Status: DISCONTINUED | OUTPATIENT
Start: 2021-02-09 | End: 2021-02-09 | Stop reason: HOSPADM

## 2021-02-09 RX ORDER — DIPHENHYDRAMINE HYDROCHLORIDE 50 MG/ML
12.5 INJECTION, SOLUTION INTRAMUSCULAR; INTRAVENOUS AS NEEDED
Status: DISCONTINUED | OUTPATIENT
Start: 2021-02-09 | End: 2021-02-09 | Stop reason: HOSPADM

## 2021-02-09 RX ORDER — SODIUM CHLORIDE, SODIUM LACTATE, POTASSIUM CHLORIDE, CALCIUM CHLORIDE 600; 310; 30; 20 MG/100ML; MG/100ML; MG/100ML; MG/100ML
125 INJECTION, SOLUTION INTRAVENOUS CONTINUOUS
Status: DISCONTINUED | OUTPATIENT
Start: 2021-02-09 | End: 2021-02-10 | Stop reason: HOSPADM

## 2021-02-09 RX ORDER — SODIUM CHLORIDE 9 MG/ML
125 INJECTION, SOLUTION INTRAVENOUS CONTINUOUS
Status: DISPENSED | OUTPATIENT
Start: 2021-02-09 | End: 2021-02-10

## 2021-02-09 RX ORDER — POVIDONE-IODINE 10 %
SOLUTION, NON-ORAL TOPICAL AS NEEDED
Status: DISCONTINUED | OUTPATIENT
Start: 2021-02-09 | End: 2021-02-09 | Stop reason: HOSPADM

## 2021-02-09 RX ORDER — HYDROCODONE BITARTRATE AND ACETAMINOPHEN 10; 325 MG/1; MG/1
1 TABLET ORAL
Status: DISCONTINUED | OUTPATIENT
Start: 2021-02-09 | End: 2021-02-10 | Stop reason: HOSPADM

## 2021-02-09 RX ORDER — HYDROXYZINE HYDROCHLORIDE 10 MG/1
10 TABLET, FILM COATED ORAL
Status: DISCONTINUED | OUTPATIENT
Start: 2021-02-09 | End: 2021-02-10 | Stop reason: HOSPADM

## 2021-02-09 RX ORDER — BUPROPION HYDROCHLORIDE 150 MG/1
300 TABLET ORAL
Status: DISCONTINUED | OUTPATIENT
Start: 2021-02-10 | End: 2021-02-10 | Stop reason: HOSPADM

## 2021-02-09 RX ORDER — SODIUM CHLORIDE, SODIUM LACTATE, POTASSIUM CHLORIDE, CALCIUM CHLORIDE 600; 310; 30; 20 MG/100ML; MG/100ML; MG/100ML; MG/100ML
INJECTION, SOLUTION INTRAVENOUS
Status: DISCONTINUED | OUTPATIENT
Start: 2021-02-09 | End: 2021-02-09

## 2021-02-09 RX ORDER — SODIUM CHLORIDE 0.9 % (FLUSH) 0.9 %
5-40 SYRINGE (ML) INJECTION AS NEEDED
Status: DISCONTINUED | OUTPATIENT
Start: 2021-02-09 | End: 2021-02-10 | Stop reason: HOSPADM

## 2021-02-09 RX ORDER — POLYETHYLENE GLYCOL 3350 17 G/17G
17 POWDER, FOR SOLUTION ORAL DAILY
Status: DISCONTINUED | OUTPATIENT
Start: 2021-02-10 | End: 2021-02-10 | Stop reason: HOSPADM

## 2021-02-09 RX ORDER — FLUMAZENIL 0.1 MG/ML
0.2 INJECTION INTRAVENOUS
Status: DISCONTINUED | OUTPATIENT
Start: 2021-02-09 | End: 2021-02-10 | Stop reason: HOSPADM

## 2021-02-09 RX ORDER — ACETAMINOPHEN 500 MG
1000 TABLET ORAL EVERY 6 HOURS
Status: DISCONTINUED | OUTPATIENT
Start: 2021-02-09 | End: 2021-02-09

## 2021-02-09 RX ORDER — OXYCODONE HCL 10 MG/1
20 TABLET, FILM COATED, EXTENDED RELEASE ORAL ONCE
Status: COMPLETED | OUTPATIENT
Start: 2021-02-09 | End: 2021-02-09

## 2021-02-09 RX ORDER — HYDROMORPHONE HYDROCHLORIDE 1 MG/ML
.25-1 INJECTION, SOLUTION INTRAMUSCULAR; INTRAVENOUS; SUBCUTANEOUS
Status: DISCONTINUED | OUTPATIENT
Start: 2021-02-09 | End: 2021-02-09 | Stop reason: HOSPADM

## 2021-02-09 RX ORDER — SERTRALINE HYDROCHLORIDE 50 MG/1
100 TABLET, FILM COATED ORAL
Status: DISCONTINUED | OUTPATIENT
Start: 2021-02-09 | End: 2021-02-10 | Stop reason: HOSPADM

## 2021-02-09 RX ORDER — DEXAMETHASONE SODIUM PHOSPHATE 4 MG/ML
INJECTION, SOLUTION INTRA-ARTICULAR; INTRALESIONAL; INTRAMUSCULAR; INTRAVENOUS; SOFT TISSUE AS NEEDED
Status: DISCONTINUED | OUTPATIENT
Start: 2021-02-09 | End: 2021-02-09 | Stop reason: HOSPADM

## 2021-02-09 RX ORDER — OXYCODONE HYDROCHLORIDE 5 MG/1
5 TABLET ORAL
Status: DISCONTINUED | OUTPATIENT
Start: 2021-02-09 | End: 2021-02-09

## 2021-02-09 RX ORDER — DEXAMETHASONE SODIUM PHOSPHATE 4 MG/ML
4 INJECTION, SOLUTION INTRA-ARTICULAR; INTRALESIONAL; INTRAMUSCULAR; INTRAVENOUS; SOFT TISSUE
Status: DISCONTINUED | OUTPATIENT
Start: 2021-02-09 | End: 2021-02-10 | Stop reason: HOSPADM

## 2021-02-09 RX ORDER — FAMOTIDINE 20 MG/1
20 TABLET, FILM COATED ORAL 2 TIMES DAILY
Status: DISCONTINUED | OUTPATIENT
Start: 2021-02-09 | End: 2021-02-10 | Stop reason: HOSPADM

## 2021-02-09 RX ORDER — LIDOCAINE HYDROCHLORIDE 20 MG/ML
INJECTION, SOLUTION EPIDURAL; INFILTRATION; INTRACAUDAL; PERINEURAL AS NEEDED
Status: DISCONTINUED | OUTPATIENT
Start: 2021-02-09 | End: 2021-02-09 | Stop reason: HOSPADM

## 2021-02-09 RX ORDER — ATORVASTATIN CALCIUM 20 MG/1
20 TABLET, FILM COATED ORAL
Status: DISCONTINUED | OUTPATIENT
Start: 2021-02-09 | End: 2021-02-10 | Stop reason: HOSPADM

## 2021-02-09 RX ORDER — ONDANSETRON 2 MG/ML
INJECTION INTRAMUSCULAR; INTRAVENOUS AS NEEDED
Status: DISCONTINUED | OUTPATIENT
Start: 2021-02-09 | End: 2021-02-09 | Stop reason: HOSPADM

## 2021-02-09 RX ORDER — SODIUM CHLORIDE 0.9 % (FLUSH) 0.9 %
5-40 SYRINGE (ML) INJECTION EVERY 8 HOURS
Status: DISCONTINUED | OUTPATIENT
Start: 2021-02-09 | End: 2021-02-10 | Stop reason: HOSPADM

## 2021-02-09 RX ORDER — FACIAL-BODY WIPES
10 EACH TOPICAL DAILY PRN
Status: DISCONTINUED | OUTPATIENT
Start: 2021-02-11 | End: 2021-02-10 | Stop reason: HOSPADM

## 2021-02-09 RX ORDER — ROPIVACAINE HYDROCHLORIDE 5 MG/ML
INJECTION, SOLUTION EPIDURAL; INFILTRATION; PERINEURAL AS NEEDED
Status: DISCONTINUED | OUTPATIENT
Start: 2021-02-09 | End: 2021-02-09 | Stop reason: HOSPADM

## 2021-02-09 RX ORDER — TRANEXAMIC ACID 100 MG/ML
INJECTION, SOLUTION INTRAVENOUS AS NEEDED
Status: DISCONTINUED | OUTPATIENT
Start: 2021-02-09 | End: 2021-02-09 | Stop reason: HOSPADM

## 2021-02-09 RX ORDER — LIDOCAINE HYDROCHLORIDE 10 MG/ML
0.1 INJECTION, SOLUTION EPIDURAL; INFILTRATION; INTRACAUDAL; PERINEURAL AS NEEDED
Status: DISCONTINUED | OUTPATIENT
Start: 2021-02-09 | End: 2021-02-10 | Stop reason: HOSPADM

## 2021-02-09 RX ORDER — SODIUM CHLORIDE, SODIUM LACTATE, POTASSIUM CHLORIDE, CALCIUM CHLORIDE 600; 310; 30; 20 MG/100ML; MG/100ML; MG/100ML; MG/100ML
125 INJECTION, SOLUTION INTRAVENOUS CONTINUOUS
Status: DISPENSED | OUTPATIENT
Start: 2021-02-09 | End: 2021-02-10

## 2021-02-09 RX ORDER — KETOROLAC TROMETHAMINE 30 MG/ML
15 INJECTION, SOLUTION INTRAMUSCULAR; INTRAVENOUS
Status: COMPLETED | OUTPATIENT
Start: 2021-02-09 | End: 2021-02-09

## 2021-02-09 RX ORDER — HYDROMORPHONE HYDROCHLORIDE 1 MG/ML
0.5 INJECTION, SOLUTION INTRAMUSCULAR; INTRAVENOUS; SUBCUTANEOUS
Status: ACTIVE | OUTPATIENT
Start: 2021-02-09 | End: 2021-02-10

## 2021-02-09 RX ORDER — NALOXONE HYDROCHLORIDE 0.4 MG/ML
0.2 INJECTION, SOLUTION INTRAMUSCULAR; INTRAVENOUS; SUBCUTANEOUS
Status: DISCONTINUED | OUTPATIENT
Start: 2021-02-09 | End: 2021-02-10 | Stop reason: HOSPADM

## 2021-02-09 RX ORDER — OXYCODONE HYDROCHLORIDE 5 MG/1
10 TABLET ORAL
Status: DISCONTINUED | OUTPATIENT
Start: 2021-02-09 | End: 2021-02-09

## 2021-02-09 RX ORDER — MIDAZOLAM HYDROCHLORIDE 1 MG/ML
INJECTION, SOLUTION INTRAMUSCULAR; INTRAVENOUS AS NEEDED
Status: DISCONTINUED | OUTPATIENT
Start: 2021-02-09 | End: 2021-02-09 | Stop reason: HOSPADM

## 2021-02-09 RX ORDER — PROPOFOL 10 MG/ML
INJECTION, EMULSION INTRAVENOUS
Status: DISCONTINUED | OUTPATIENT
Start: 2021-02-09 | End: 2021-02-09 | Stop reason: HOSPADM

## 2021-02-09 RX ADMIN — PHENYLEPHRINE HYDROCHLORIDE 100 MCG: 10 INJECTION INTRAVENOUS at 08:23

## 2021-02-09 RX ADMIN — ONDANSETRON 4 MG: 2 INJECTION INTRAMUSCULAR; INTRAVENOUS at 20:19

## 2021-02-09 RX ADMIN — BUPIVACAINE HYDROCHLORIDE 2.6 ML: 5 INJECTION, SOLUTION EPIDURAL; INTRACAUDAL; PERINEURAL at 07:24

## 2021-02-09 RX ADMIN — HYDROCODONE BITARTRATE AND ACETAMINOPHEN 1 TABLET: 10; 325 TABLET ORAL at 15:01

## 2021-02-09 RX ADMIN — SODIUM CHLORIDE, POTASSIUM CHLORIDE, SODIUM LACTATE AND CALCIUM CHLORIDE: 600; 310; 30; 20 INJECTION, SOLUTION INTRAVENOUS at 06:44

## 2021-02-09 RX ADMIN — FENTANYL CITRATE 50 MCG: 0.05 INJECTION, SOLUTION INTRAMUSCULAR; INTRAVENOUS at 07:08

## 2021-02-09 RX ADMIN — ATORVASTATIN CALCIUM 20 MG: 20 TABLET, FILM COATED ORAL at 22:49

## 2021-02-09 RX ADMIN — ONDANSETRON HYDROCHLORIDE 4 MG: 2 SOLUTION INTRAMUSCULAR; INTRAVENOUS at 07:49

## 2021-02-09 RX ADMIN — ASPIRIN 81 MG: 81 TABLET, COATED ORAL at 18:42

## 2021-02-09 RX ADMIN — MIDAZOLAM HYDROCHLORIDE 2 MG: 2 INJECTION, SOLUTION INTRAMUSCULAR; INTRAVENOUS at 07:40

## 2021-02-09 RX ADMIN — PHENYLEPHRINE HYDROCHLORIDE 100 MCG: 10 INJECTION INTRAVENOUS at 08:40

## 2021-02-09 RX ADMIN — OXYCODONE HYDROCHLORIDE 20 MG: 10 TABLET, FILM COATED, EXTENDED RELEASE ORAL at 06:13

## 2021-02-09 RX ADMIN — PHENYLEPHRINE HYDROCHLORIDE 100 MCG: 10 INJECTION INTRAVENOUS at 07:53

## 2021-02-09 RX ADMIN — KETOROLAC TROMETHAMINE 15 MG: 30 INJECTION, SOLUTION INTRAMUSCULAR at 11:46

## 2021-02-09 RX ADMIN — SODIUM CHLORIDE 125 ML/HR: 9 INJECTION, SOLUTION INTRAVENOUS at 11:31

## 2021-02-09 RX ADMIN — HYDROCODONE BITARTRATE AND ACETAMINOPHEN 1 TABLET: 10; 325 TABLET ORAL at 20:10

## 2021-02-09 RX ADMIN — CELECOXIB 100 MG: 100 CAPSULE ORAL at 06:13

## 2021-02-09 RX ADMIN — ACETAMINOPHEN 975 MG: 325 TABLET ORAL at 06:13

## 2021-02-09 RX ADMIN — SODIUM CHLORIDE, POTASSIUM CHLORIDE, SODIUM LACTATE AND CALCIUM CHLORIDE: 600; 310; 30; 20 INJECTION, SOLUTION INTRAVENOUS at 10:28

## 2021-02-09 RX ADMIN — DEXAMETHASONE SODIUM PHOSPHATE 8 MG: 4 INJECTION, SOLUTION INTRAMUSCULAR; INTRAVENOUS at 07:48

## 2021-02-09 RX ADMIN — TRANEXAMIC ACID 1000 MG: 100 INJECTION, SOLUTION INTRAVENOUS at 10:04

## 2021-02-09 RX ADMIN — CEFAZOLIN 2 G: 1 INJECTION, POWDER, FOR SOLUTION INTRAMUSCULAR; INTRAVENOUS at 14:03

## 2021-02-09 RX ADMIN — PHENYLEPHRINE HYDROCHLORIDE 100 MCG: 10 INJECTION INTRAVENOUS at 07:45

## 2021-02-09 RX ADMIN — SODIUM CHLORIDE, POTASSIUM CHLORIDE, SODIUM LACTATE AND CALCIUM CHLORIDE 1000 ML: 600; 310; 30; 20 INJECTION, SOLUTION INTRAVENOUS at 06:16

## 2021-02-09 RX ADMIN — SODIUM CHLORIDE 125 ML/HR: 9 INJECTION, SOLUTION INTRAVENOUS at 18:47

## 2021-02-09 RX ADMIN — TRANEXAMIC ACID 1000 MG: 100 INJECTION, SOLUTION INTRAVENOUS at 07:41

## 2021-02-09 RX ADMIN — ROPIVACAINE HYDROCHLORIDE 30 ML: 5 INJECTION, SOLUTION EPIDURAL; INFILTRATION; PERINEURAL at 07:16

## 2021-02-09 RX ADMIN — FAMOTIDINE 20 MG: 20 TABLET, FILM COATED ORAL at 18:42

## 2021-02-09 RX ADMIN — PROPOFOL 50 MCG/KG/MIN: 10 INJECTION, EMULSION INTRAVENOUS at 07:42

## 2021-02-09 RX ADMIN — PHENYLEPHRINE HYDROCHLORIDE 100 MCG: 10 INJECTION INTRAVENOUS at 07:40

## 2021-02-09 RX ADMIN — SERTRALINE 100 MG: 50 TABLET, FILM COATED ORAL at 22:49

## 2021-02-09 RX ADMIN — CEFAZOLIN SODIUM 2 G: 1 POWDER, FOR SOLUTION INTRAMUSCULAR; INTRAVENOUS at 07:52

## 2021-02-09 RX ADMIN — LIDOCAINE HYDROCHLORIDE 60 MG: 20 INJECTION, SOLUTION INTRAVENOUS at 07:42

## 2021-02-09 RX ADMIN — FENTANYL CITRATE 50 MCG: 0.05 INJECTION, SOLUTION INTRAMUSCULAR; INTRAVENOUS at 07:17

## 2021-02-09 RX ADMIN — DOCUSATE SODIUM 50MG AND SENNOSIDES 8.6MG 1 TABLET: 8.6; 5 TABLET, FILM COATED ORAL at 18:42

## 2021-02-09 RX ADMIN — Medication 10 ML: at 22:00

## 2021-02-09 RX ADMIN — MIDAZOLAM HYDROCHLORIDE 2 MG: 2 INJECTION, SOLUTION INTRAMUSCULAR; INTRAVENOUS at 07:08

## 2021-02-09 RX ADMIN — PHENYLEPHRINE HYDROCHLORIDE 100 MCG: 10 INJECTION INTRAVENOUS at 07:26

## 2021-02-09 RX ADMIN — CEFAZOLIN 2 G: 1 INJECTION, POWDER, FOR SOLUTION INTRAMUSCULAR; INTRAVENOUS at 22:49

## 2021-02-09 RX ADMIN — SODIUM CHLORIDE, POTASSIUM CHLORIDE, SODIUM LACTATE AND CALCIUM CHLORIDE: 600; 310; 30; 20 INJECTION, SOLUTION INTRAVENOUS at 07:18

## 2021-02-09 RX ADMIN — Medication 10 ML: at 18:42

## 2021-02-09 RX ADMIN — PHENYLEPHRINE HYDROCHLORIDE 200 MCG: 10 INJECTION INTRAVENOUS at 08:01

## 2021-02-09 NOTE — PROGRESS NOTES
Bedside and Verbal shift change report given to Maria Esther Lloyd (oncoming nurse) by Kim Will RN (offgoing nurse). Report included the following information SBAR, Kardex, Intake/Output, MAR, Recent Results and Med Rec Status.

## 2021-02-09 NOTE — PROGRESS NOTES
Problem: Mobility Impaired (Adult and Pediatric)  Goal: *Acute Goals and Plan of Care (Insert Text)  Description: FUNCTIONAL STATUS PRIOR TO ADMISSION: Patient was independent and active without use of DME. 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 HOME SUPPORT PRIOR TO ADMISSION: The patient lived with  but did not require assist.    Physical Therapy Goals  Initiated 2/9/2021    1. Patient will move from supine to sit and sit to supine  in bed with independence within 4 days. 2. Patient will perform sit to stand with modified independence within 4 days. 3. Patient will ambulate with modified independence for 100 feet with the least restrictive device within 4 days. 4. Patient will ascend/descend 4 stairs with 1 handrail(s) with minimal assistance/contact guard assist within 4 days. 5. Patient will perform home exercise program per protocol with independence within 4 days. 6. Patient will demonstrate AROM 0-90 degrees in operative joint within 4 days. Outcome: Progressing Towards Goal   PHYSICAL THERAPY EVALUATION  Patient: Denton Cabot (1 y.o. female)  Date: 2/9/2021  Primary Diagnosis: Primary osteoarthritis of right knee [M17.11]  Procedure(s) (LRB):  RIGHT TOTAL KNEE ARTHROPLASTY - ANGEL (ANES SPINAL WITH REGIONAL BLOCK) (Right) Day of Surgery   Precautions:   WBAT, Fall    ASSESSMENT  Based on the objective data described below, the patient presents with decreased ROM and strength to RLE, decreased bed mobility, transfers and gait following admission for right TKA, makoplasty. Patient has all DME and will need OP PT upon discharge. Current Level of Function Impacting Discharge (mobility/balance): Educated patient on bed exercises and provided handout. She has good SLR and dorsiflexion on operative leg. Patient stood and ambulated to bedside commode and back, about 6 feet, with rolling walker +2 CGA. No buckling noted. HR a bit fast but patient states this is baseline.  BP stable (see doc flow sheets). Functional Outcome Measure: The patient scored 50/100 on the Barthel outcome measure. Other factors to consider for discharge: none     Patient will benefit from skilled therapy intervention to address the above noted impairments. PLAN :  Recommendations and Planned Interventions: bed mobility training, transfer training, gait training, and therapeutic exercises      Frequency/Duration: Patient will be followed by physical therapy:  twice daily to address goals. Recommendation for discharge: (in order for the patient to meet his/her long term goals)  Outpatient physical therapy follow up recommended for TKA    This discharge recommendation:  Has not yet been discussed the attending provider and/or case management    IF patient discharges home will need the following DME: patient owns DME required for discharge         SUBJECTIVE:   Patient stated I feel good; I have been out of surgery for a while now. 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    HX WISDOM TEETH EXTRACTION         Personal factors and/or comorbidities impacting plan of care: none    Home Situation  Home Environment: Private residence  # Steps to Enter: 6  Rails to Enter: Yes  One/Two Story Residence: Split level  Living Alone: No  Support Systems: None  Patient Expects to be Discharged to[de-identified] Private residence  Current DME Used/Available at Home: [employer]    EXAMINATION/PRESENTATION/DECISION MAKING:   Critical Behavior:  Neurologic State: Alert  Orientation Level: Oriented X4     Safety/Judgement: Good awareness of safety precautions  Hearing: Auditory  Auditory Impairment: None      Range Of Motion:  AROM: Within functional limits              RLE AROM  R Knee Flexion: 80  R Knee Extension: 10        Strength:    Strength: Within functional limits(has good SLR on operative leg)                    Tone & Sensation:   Tone: Normal              Sensation: Intact                       Functional Mobility:  Bed Mobility:     Supine to Sit: Stand-by assistance;Modified independent  Sit to Supine: Contact guard assistance  Scooting: Stand-by assistance  Transfers:  Sit to Stand: Assist x2;Contact guard assistance  Stand to Sit: Assist x2;Contact guard assistance                       Balance:   Sitting: Intact  Standing: Intact; With support  Ambulation/Gait Training:  Distance (ft): 6 Feet (ft)  Assistive Device: Gait belt;Walker, rolling  Ambulation - Level of Assistance: Assist x2;Contact guard assistance        Gait Abnormalities: Step to gait  Right Side Weight Bearing: As tolerated                                                  Therapeutic Exercises: Ankle pumps, quad and heel sets, heel slides, SLR    Functional Measure:  Barthel Index:    Bathin  Bladder: 10  Bowels: 10  Groomin  Dressin  Feeding: 10  Mobility: 0  Stairs: 0  Toilet Use: 5  Transfer (Bed to Chair and Back): 5  Total: 50/100       The Barthel ADL Index: Guidelines  1. The index should be used as a record of what a patient does, not as a record of what a patient could do. 2. The main aim is to establish degree of independence from any help, physical or verbal, however minor and for whatever reason. 3. The need for supervision renders the patient not independent. 4. A patient's performance should be established using the best available evidence.  Asking the patient, friends/relatives and nurses are the usual sources, but direct observation and common sense are also important. However direct testing is not needed. 5. Usually the patient's performance over the preceding 24-48 hours is important, but occasionally longer periods will be relevant. 6. Middle categories imply that the patient supplies over 50 per cent of the effort. 7. Use of aids to be independent is allowed. Isamar Peacock., Barthel, D.W. ([id number]). Functional evaluation: the Barthel Index. [address] [facility] (14)2. ALIYAH Sinclair, Yolanda Malone., Naveed López., [place], [address] [facility] (). Measuring the change indisability after inpatient rehabilitation; comparison of the responsiveness of the Barthel Index and Functional [place] Measure. Journal of Neurology, Neurosurgery, and Psychiatry, 66(4), 698-731. DEBBI Hood, NIKA Carrillo, & Miryam Cuevas MCOLLIN. (2004.) Assessment of post-stroke quality of life in cost-effectiveness studies: The usefulness of the Barthel Index and the EuroQoL-5D. Quality of Life Research, 15, 293-26           Physical Therapy Evaluation Charge Determination   History Examination Presentation Decision-Making   LOW Complexity : Zero comorbidities / personal factors that will impact the outcome / POC LOW Complexity : 1-2 Standardized tests and measures addressing body structure, function, activity limitation and / or participation in recreation  LOW Complexity : Stable, uncomplicated  Other outcome measures Barthel  LOW       Based on the above components, the patient evaluation is determined to be of the following complexity level: LOW     Pain Ratin/10 (nursing aware and preparing to medicate patient)    Activity Tolerance:   Good    After treatment patient left in no apparent distress:   Supine in bed, Call bell within reach, Bed / chair alarm activated, and Side rails x 3    COMMUNICATION/EDUCATION:   The patients plan of care was discussed with: Registered nurse.      Fall prevention education was provided and the patient/caregiver indicated understanding. and Patient/family agree to work toward stated goals and plan of care.     Thank you for this referral.  Jim Cota, PT   Time Calculation: 30 mins

## 2021-02-09 NOTE — PERIOP NOTES
TRANSFER - OUT REPORT:    Verbal report given to RAOUL Cadena(name) on Sadia Howard  being transferred to [facility](unit) for routine progression of care       Report consisted of patients Situation, Background, Assessment and   Recommendations(SBAR). Information from the following report(s) SBAR, Kardex and MAR was reviewed with the receiving nurse. Lines:   Peripheral IV 02/09/21 Left Arm (Active)   Site Assessment Clean, dry, & intact 02/09/21 1230   Phlebitis Assessment 0 02/09/21 1230   Infiltration Assessment 0 02/09/21 1230   Dressing Status Clean, dry, & intact 02/09/21 1230   Dressing Type Transparent 02/09/21 1230   Hub Color/Line Status Pink; Infusing;Patent 02/09/21 1230   Alcohol Cap Used Yes 02/09/21 1230        Opportunity for questions and clarification was provided.       Patient transported with:   Registered Nurse

## 2021-02-09 NOTE — ANESTHESIA PROCEDURE NOTES
Spinal Block    Start time: 2/9/2021 7:17 AM  End time: 2/9/2021 7:24 AM  Performed by: Diana Reed CRNA  Authorized by: Yelena Berman MD     Pre-procedure:   Indications: at surgeon's request and primary anesthetic  Preanesthetic Checklist: patient identified, risks and benefits discussed, anesthesia consent, site marked, patient being monitored and timeout performed    Timeout Time: 07:08          Spinal Block:   Patient Position:  Seated  Prep Region:  Lumbar  Prep: chlorhexidine      Location:  L3-4  Technique:  Single shot        Needle:   Needle Type:  Pencan  Needle Gauge:  25 G  Attempts:  1      Events: CSF confirmed, no blood with aspiration and no paresthesia        Assessment:  Insertion:  Uncomplicated  Patient tolerance:  Patient tolerated the procedure well with no immediate complications

## 2021-02-09 NOTE — OP NOTES
OPERATIVE REPORT     Admit Date: 2/9/2021  Admit Diagnosis: Primary osteoarthritis of right knee [M17.11]    Date of Procedure: 2/9/2021   Preoperative Diagnosis: PRIMARY OA OF RIGHT KNEE  Postoperative Diagnosis: PRIMARY OA OF RIGHT KNEE    Procedure: Procedure(s):  RIGHT TOTAL KNEE ARTHROPLASTY - ANGEL (ANES SPINAL WITH REGIONAL BLOCK)  Surgeon: Michaela Schmitt MD  Assistant(s): YARIEL Robertson  Anesthesia: Spinal   Antibiotic: ANCEF 2 g  Estimated Blood Loss:100  Tourniquet Time: 60 @ 250 mmHg   Specimens: * No specimens in log *   Complications: None         Implants Utilized:  Juan Triathlon Cruciate Retaining Femoral Cementless Beaded Component, size #4 CR RT, [employer] Triathlon Tritanium Tibial Baseplate, size #5, [employer] Tri[employer]lon X3 Tibial Bearing Insert - CS, size #5x13mm, [employer] Tri[employer]lon X3 Asymmetric Metal Backed Patella size [id number]. INDICATIONS:   The patient is a 63 y.o., female who has complained of a long history of right knee pain. The patient  has failed conservative treatment and presents for definitive operative care. Informed consent obtained including a discussion of the risks and benefits, which include, but are not limited to, bleeding, infection, neurovascular damage, wound complications, pain and stiffness in the knee, incomplete relief of symptoms or dissatisfaction with surgery, periprosthetic loosening, fracture, dislocation and DVT, the patient consented for the procedure. DESCRIPTION OF PROCEDURE:           The patient was seen in the preoperative holding area. The patient was positively identified. The limb was initialed,  questions were answered. The patient was subsequently taken to the operating room. The patient underwent spinal anesthesia. The patient was positioned in the supine position. All bony prominences were well padded. The limb was prepped and draped in a sterile fashion. The appropriate pause for safety was performed.      After adequate anesthesia, the correct lower extremity was prepped and draped in sterile fashion. The patient was positioned supine on the operating room table. Once positioned a formal time-out was performed that identified the operative extremity. Before proceeding with surgery we ensured all implants and instruments were available and preoperative imaging/templating was available and in the room which confirmed the operative extremity. A mid-line parapatellar incision was used along with medial arthrotomy. Soft tissue were removed from the patellar fat pad and notch. The MCL was mobilized and the tibia subluxed. Patellar preparation was completed to include removal of the fat pad, patellar cut and protector placed. Next we proceeded with placement of our femoral and tibial arrays. The femoral array was placed inside of our incision 1 handbreadth proximal to the joint line. The tibial array was placed in a similar fashion 1 handbreadth below the joint line with poke hole incisions just off the crest of the tibia. Arrays were confirmed to be in appropriate position and tightened. The medial-based soft tissue was then retracted as well as well as the lateral tissue. The femoral and tibial trackers were placed. The hip center or rotation was established. Registration was performed on the femur and tibia. Osteophytes were removed. The knee was balanced in both flexion and extension with force applied. The computer model of implants and position was verified. Once this was complete the MAKOplasty robot was positioned. Standard cuts were made for the distal femur and posterior chamfer first. The blade was changed and then the remaining femoral and then tibial cuts were made. We then removed our bony resections and excess bone. Then using a lamina  we removed any posterior osteophytes from the medial and lateral recess along with any residual soft tissue or meniscus.           Trial implants were placed and range of motion along with overall fit was established with very good integrity of the MCL noted. The patella then was sized and drill holes created. Once happy with the trial the tibial preparation was completed for Press-Fit fixation. Tibial baseplate placement and keel preparation were performed along with drill holes for the tibial baseplate. We then inspected throughout the knee for any areas of sclerotic bone and any areas of sclerotic bone were further drilled and prepared for cementless fixation. The ruddy pins and trackers were removed and accounted for prior to closure. All the bony surfaces were irrigated. The tibia was placed first, then the poly, residual osteophytes were removed and then the femur.  The patella was then placed, all bony surfaces were verified.  The knee was then soaked with dilute betadine solution and then irrigated once more time.          The knee was very stable and it was taken through a full range of motion. The capsulotomy was closed with interrupted #1 Vicryls and #2 Stratafix suture in a running fashion after placing vancomycin powder in the wound and obtaining hemostasis.  The sub-cutaneous tissue was closed with 2-0 Vicryl in subcutaneous tissue and running 3-0 monocryl Stratafix in the dermis with adhesive glue on the skin. A sterile dressing was applied. The patient was awoken from anesthesia and taken to the recovery room in a stable condiition.     OPERATIVE FINDINGS : laxity after cuts requiring up size poly to 13, severe OA in PF and medial compartments.       Arnulfo Squires MD

## 2021-02-09 NOTE — ANESTHESIA POSTPROCEDURE EVALUATION
Procedure(s):  RIGHT TOTAL KNEE ARTHROPLASTY - ANGEL (ANES SPINAL WITH REGIONAL BLOCK).     regional, spinal    Anesthesia Post Evaluation      Multimodal analgesia: multimodal analgesia not used between 6 hours prior to anesthesia start to PACU discharge  Patient location during evaluation: PACU  Patient participation: complete - patient participated  Level of consciousness: awake  Pain management: adequate  Airway patency: patent  Anesthetic complications: no  Cardiovascular status: acceptable, blood pressure returned to baseline and hemodynamically stable  Respiratory status: acceptable  Hydration status: acceptable  Post anesthesia nausea and vomiting:  controlled      INITIAL Post-op Vital signs:   Vitals Value Taken Time   /77 02/09/21 1300   Temp 36.8 °C (98.3 °F) 02/09/21 1050   Pulse 92 02/09/21 1300   Resp 17 02/09/21 1300   SpO2 95 % 02/09/21 1300

## 2021-02-09 NOTE — ANESTHESIA PREPROCEDURE EVALUATION
Relevant Problems   NEUROLOGY   (+) Anxiety and depression   (+) New onset of headaches after age 47      ENDOCRINE   (+) Obesity, morbid (HCC)   (+) Type 2 diabetes mellitus with complication, without long-term current use of insulin (HCC)       Anesthetic History   No history of anesthetic complications            Review of Systems / Medical History  Patient summary reviewed, nursing notes reviewed and pertinent labs reviewed    Pulmonary  Within defined limits                 Neuro/Psych         Headaches     Cardiovascular  Within defined limits                Exercise tolerance: >4 METS     GI/Hepatic/Renal  Within defined limits              Endo/Other    Diabetes    Morbid obesity and arthritis     Other Findings              Physical Exam    Airway  Mallampati: II    Neck ROM: normal range of motion   Mouth opening: Normal     Cardiovascular  Regular rate and rhythm,  S1 and S2 normal,  no murmur, click, rub, or gallop  Rhythm: regular  Rate: normal         Dental  No notable dental hx       Pulmonary  Breath sounds clear to auscultation               Abdominal  GI exam deferred       Other Findings            Anesthetic Plan    ASA: 3  Anesthesia type: regional and spinal - saphenous block          Induction: Intravenous  Anesthetic plan and risks discussed with: Patient

## 2021-02-09 NOTE — ANESTHESIA PROCEDURE NOTES
Peripheral Block    Start time: 2/9/2021 7:08 AM  End time: 2/9/2021 7:16 AM  Performed by: Terri Naidu CRNA  Authorized by: Yonis Scruggs MD       Pre-procedure: Indications: at surgeon's request and post-op pain management    Preanesthetic Checklist: patient identified, risks and benefits discussed, site marked, timeout performed, anesthesia consent given and patient being monitored    Timeout Time: 07:08          Block Type:   Block Type:   Adductor canal  Laterality:  Right  Monitoring:  Continuous pulse ox, frequent vital sign checks, heart rate and responsive to questions  Injection Technique:  Single shot  Procedures: ultrasound guided    Patient Position: supine  Prep: chlorhexidine    Location:  Lower thigh  Needle Type:  Stimuplex  Needle Gauge:  21 G  Needle Localization:  Ultrasound guidance    Assessment:  Number of attempts:  1  Injection Assessment:  Incremental injection every 5 mL, local visualized surrounding nerve on ultrasound, negative aspiration for blood, no paresthesia and no intravascular symptoms  Patient tolerance:  Patient tolerated the procedure well with no immediate complications

## 2021-02-09 NOTE — PERIOP NOTES
4th floor Charge RN ( Tammi  RN)  notified of admission/pending return and awaiting receiving ( same  RN) call-back for assignment to/return to room 403.

## 2021-02-09 NOTE — INTERVAL H&P NOTE
Update History & Physical 
 
The Patient's History and Physical of January 28th, 2021 was reviewed with the patient and I examined the patient. There was no change. The surgical site was confirmed by the patient and me. Plan:  The risk, benefits, expected outcome, and alternative to the recommended procedure have been discussed with the patient. Patient understands and wants to proceed with the procedure.  
 
Electronically signed by Maye Longoria MD on 2/9/2021 at 6:45 AM

## 2021-02-09 NOTE — PROGRESS NOTES
Problem: Falls - Risk of  Goal: *Absence of Falls  Description: Document Graham Mancini Fall Risk and appropriate interventions in the flowsheet.   Outcome: Progressing Towards Goal  Note: Fall Risk Interventions:  Mobility Interventions: Bed/chair exit alarm         Medication Interventions: Bed/chair exit alarm    Elimination Interventions: Call light in reach    History of Falls Interventions: Consult care management for discharge planning         Problem: Patient Education: Go to Patient Education Activity  Goal: Patient/Family Education  Outcome: Progressing Towards Goal     Problem: Patient Education: Go to Patient Education Activity  Goal: Patient/Family Education  Outcome: Progressing Towards Goal     Problem: Patient Education: Go to Patient Education Activity  Goal: Patient/Family Education  Outcome: Progressing Towards Goal     Problem: Knee Replacement: Day of Surgery/Unit  Goal: Off Pathway (Use only if patient is Off Pathway)  Outcome: Progressing Towards Goal  Goal: Activity/Safety  Outcome: Progressing Towards Goal  Goal: Consults, if ordered  Outcome: Progressing Towards Goal  Goal: Diagnostic Test/Procedures  Outcome: Progressing Towards Goal  Goal: Nutrition/Diet  Outcome: Progressing Towards Goal  Goal: Medications  Outcome: Progressing Towards Goal  Goal: Respiratory  Outcome: Progressing Towards Goal  Goal: Treatments/Interventions/Procedures  Outcome: Progressing Towards Goal  Goal: Psychosocial  Outcome: Progressing Towards Goal  Goal: *Initiate mobility  Outcome: Progressing Towards Goal  Goal: *Optimal pain control at patient's stated goal  Outcome: Progressing Towards Goal  Goal: *Hemodynamically stable  Outcome: Progressing Towards Goal     Problem: Knee Replacement: Post-Op Day 2  Goal: Off Pathway (Use only if patient is Off Pathway)  Outcome: Progressing Towards Goal  Goal: Activity/Safety  Outcome: Progressing Towards Goal  Goal: Diagnostic Test/Procedures  Outcome: Progressing Towards Goal  Goal: Medications  Outcome: Progressing Towards Goal  Goal: Respiratory  Outcome: Progressing Towards Goal  Goal: Treatments/Interventions/Procedures  Outcome: Progressing Towards Goal  Goal: Psychosocial  Outcome: Progressing Towards Goal  Goal: *Met physical therapy criteria for discharge to the next level of care  Outcome: Progressing Towards Goal  Goal: *Optimal pain control with oral analgesia  Outcome: Progressing Towards Goal  Goal: *Hemodynamically stable  Outcome: Progressing Towards Goal  Goal: *Tolerating diet  Outcome: Progressing Towards Goal  Goal: *Patient verbalizes understanding of discharge instructions  Outcome: Progressing Towards Goal     Problem: Pain  Goal: *Control of Pain  Outcome: Progressing Towards Goal  Goal: *PALLIATIVE CARE:  Alleviation of Pain  Outcome: Progressing Towards Goal     Problem: Patient Education: Go to Patient Education Activity  Goal: Patient/Family Education  Outcome: Progressing Towards Goal

## 2021-02-10 VITALS
RESPIRATION RATE: 17 BRPM | SYSTOLIC BLOOD PRESSURE: 97 MMHG | TEMPERATURE: 99.3 F | BODY MASS INDEX: 39.41 KG/M2 | HEART RATE: 77 BPM | WEIGHT: 236.55 LBS | HEIGHT: 65 IN | OXYGEN SATURATION: 95 % | DIASTOLIC BLOOD PRESSURE: 63 MMHG

## 2021-02-10 LAB
ANION GAP SERPL CALC-SCNC: 5 MMOL/L (ref 5–15)
BUN SERPL-MCNC: 11 MG/DL (ref 6–20)
BUN/CREAT SERPL: 15 (ref 12–20)
CALCIUM SERPL-MCNC: 8.1 MG/DL (ref 8.5–10.1)
CHLORIDE SERPL-SCNC: 110 MMOL/L (ref 97–108)
CO2 SERPL-SCNC: 27 MMOL/L (ref 21–32)
COMMENT, HOLDF: NORMAL
CREAT SERPL-MCNC: 0.71 MG/DL (ref 0.55–1.02)
GLUCOSE SERPL-MCNC: 105 MG/DL (ref 65–100)
HGB BLD-MCNC: 11.6 G/DL (ref 11.5–16)
POTASSIUM SERPL-SCNC: 4.2 MMOL/L (ref 3.5–5.1)
SAMPLES BEING HELD,HOLD: NORMAL
SODIUM SERPL-SCNC: 142 MMOL/L (ref 136–145)

## 2021-02-10 PROCEDURE — 36415 COLL VENOUS BLD VENIPUNCTURE: CPT

## 2021-02-10 PROCEDURE — 99218 HC RM OBSERVATION: CPT

## 2021-02-10 PROCEDURE — 74011250637 HC RX REV CODE- 250/637: Performed by: STUDENT IN AN ORGANIZED HEALTH CARE EDUCATION/TRAINING PROGRAM

## 2021-02-10 PROCEDURE — 97116 GAIT TRAINING THERAPY: CPT

## 2021-02-10 PROCEDURE — 85018 HEMOGLOBIN: CPT

## 2021-02-10 PROCEDURE — 74011250636 HC RX REV CODE- 250/636: Performed by: STUDENT IN AN ORGANIZED HEALTH CARE EDUCATION/TRAINING PROGRAM

## 2021-02-10 PROCEDURE — 80048 BASIC METABOLIC PNL TOTAL CA: CPT

## 2021-02-10 PROCEDURE — 97530 THERAPEUTIC ACTIVITIES: CPT

## 2021-02-10 PROCEDURE — 74011250637 HC RX REV CODE- 250/637: Performed by: NURSE PRACTITIONER

## 2021-02-10 PROCEDURE — 74011000250 HC RX REV CODE- 250: Performed by: STUDENT IN AN ORGANIZED HEALTH CARE EDUCATION/TRAINING PROGRAM

## 2021-02-10 PROCEDURE — 96374 THER/PROPH/DIAG INJ IV PUSH: CPT

## 2021-02-10 RX ORDER — HYDROCODONE BITARTRATE AND ACETAMINOPHEN 5; 325 MG/1; MG/1
1-2 TABLET ORAL
Qty: 42 TAB | Refills: 0 | Status: SHIPPED | OUTPATIENT
Start: 2021-02-10 | End: 2021-02-17

## 2021-02-10 RX ORDER — HYDROXYZINE HYDROCHLORIDE 10 MG/1
10 TABLET, FILM COATED ORAL
Qty: 21 TAB | Refills: 0 | Status: SHIPPED | OUTPATIENT
Start: 2021-02-10 | End: 2021-02-20

## 2021-02-10 RX ORDER — POLYETHYLENE GLYCOL 3350 17 G/17G
17 POWDER, FOR SOLUTION ORAL DAILY
Qty: 30 PACKET | Refills: 0 | Status: SHIPPED | OUTPATIENT
Start: 2021-02-10 | End: 2021-03-12

## 2021-02-10 RX ORDER — AMOXICILLIN 250 MG
1 CAPSULE ORAL 2 TIMES DAILY
Qty: 30 TAB | Refills: 0 | Status: SHIPPED | OUTPATIENT
Start: 2021-02-10 | End: 2021-02-25

## 2021-02-10 RX ORDER — OXYCODONE HYDROCHLORIDE 5 MG/1
5 TABLET ORAL
Qty: 42 TAB | Refills: 0 | Status: SHIPPED | OUTPATIENT
Start: 2021-02-10 | End: 2021-02-10

## 2021-02-10 RX ORDER — ASPIRIN 81 MG/1
81 TABLET ORAL 2 TIMES DAILY
Qty: 60 TAB | Refills: 0 | Status: SHIPPED | OUTPATIENT
Start: 2021-02-10 | End: 2021-03-12

## 2021-02-10 RX ORDER — CELECOXIB 200 MG/1
200 CAPSULE ORAL 2 TIMES DAILY
Qty: 60 CAP | Refills: 2 | Status: SHIPPED | OUTPATIENT
Start: 2021-02-10 | End: 2021-05-11

## 2021-02-10 RX ORDER — TRAMADOL HYDROCHLORIDE 50 MG/1
50 TABLET ORAL
Qty: 30 TAB | Refills: 0 | Status: SHIPPED | OUTPATIENT
Start: 2021-02-10 | End: 2021-02-20

## 2021-02-10 RX ADMIN — HYDROCODONE BITARTRATE AND ACETAMINOPHEN 1 TABLET: 10; 325 TABLET ORAL at 12:03

## 2021-02-10 RX ADMIN — DOCUSATE SODIUM 50MG AND SENNOSIDES 8.6MG 1 TABLET: 8.6; 5 TABLET, FILM COATED ORAL at 12:02

## 2021-02-10 RX ADMIN — CEFAZOLIN 2 G: 1 INJECTION, POWDER, FOR SOLUTION INTRAMUSCULAR; INTRAVENOUS at 06:12

## 2021-02-10 RX ADMIN — HYDROCODONE BITARTRATE AND ACETAMINOPHEN 1 TABLET: 10; 325 TABLET ORAL at 02:24

## 2021-02-10 RX ADMIN — POLYETHYLENE GLYCOL 3350 17 G: 17 POWDER, FOR SOLUTION ORAL at 12:02

## 2021-02-10 RX ADMIN — HYDROCODONE BITARTRATE AND ACETAMINOPHEN 1 TABLET: 10; 325 TABLET ORAL at 06:25

## 2021-02-10 RX ADMIN — ONDANSETRON 4 MG: 2 INJECTION INTRAMUSCULAR; INTRAVENOUS at 10:49

## 2021-02-10 RX ADMIN — ASPIRIN 81 MG: 81 TABLET, COATED ORAL at 12:02

## 2021-02-10 RX ADMIN — FAMOTIDINE 20 MG: 20 TABLET, FILM COATED ORAL at 12:02

## 2021-02-10 RX ADMIN — BUPROPION HYDROCHLORIDE 300 MG: 150 TABLET, EXTENDED RELEASE ORAL at 06:12

## 2021-02-10 NOTE — PROGRESS NOTES
Nurse handed pattient a copy of discharge instructions. Instructions included a list of new medications which have been E-scribed to her pharmacy.  and son at her side. Nurse read and explained these instructions and medications to them.  Verbalized understanding

## 2021-02-10 NOTE — PROGRESS NOTES
Bedside and Verbal shift change report given to Lucas Cabrales RN (oncoming nurse) by Bhaskar Kinney RN (offgoing nurse). Report included the following information SBAR, Kardex, Intake/Output, MAR, Accordion and Recent Results.

## 2021-02-10 NOTE — PROGRESS NOTES
TOTAL KNEE ARTHROPLASTY DAILY NOTE    POD  1 Day Post-Op s/p Procedure(s):  RIGHT TOTAL KNEE ARTHROPLASTY - ANGEL (ANES SPINAL WITH REGIONAL BLOCK)     ASSESSMENT / PLAN :   1. Pain Control : Excellent - Able to sleep and participate with therapy  2. Wound or incisional issue : Healing incision with small areas of intermittent bloody drainage  3. Therapy / Weight Bearing Recommendations : Weight bear as tolerated with use of a walker and two person assist while mobilizing  4. DVT Prophylaxis : Aspirin and mechanical lower extremity compression device  5. Disposition : Discharge home today with self-care. 6. Medical Concerns : None        SUBJECTIVE :     BERTRAM overnight. Pain controlled. Working with PT. Voiding without difficulty. Tolerating a regular diet. Denies CP/SOB/Abd pain/or other complaints. OBJECTIVE :     Vitals:    02/09/21 1557 02/09/21 1929 02/10/21 0005 02/10/21 0456   BP: (!) 104/58 99/60 130/65 111/73   Pulse: 100 94 92 76   Resp: 16 16 16 16   Temp: 98.1 °F (36.7 °C) 98.1 °F (36.7 °C) 98 °F (36.7 °C) 97.9 °F (36.6 °C)   SpO2: 94% 95% 98% 97%   Weight:       Height:       LMP: 12/21/2016       Alert and oriented x3. NAD  Examination of the right knee reveals that the dressing is clean, dry and intact. 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 Patient mobility  Gait  Gait Abnormalities: Step to gait  Ambulation - Level of Assistance: Assist x2, Contact guard assistance  Distance (ft): 6 Feet (ft)  Assistive Device: Gait belt, Walker, rolling          Arnulfo Coleman [place]  Supervising Physician: Dr. Hussain Valentino.  [address] [place] [address] [phone]  Medical Staff : [facility] Jasmyn Lozano  Office : [phone]

## 2021-02-10 NOTE — PROGRESS NOTES
Problem: Mobility Impaired (Adult and Pediatric)  Goal: *Acute Goals and Plan of Care (Insert Text)  Description: FUNCTIONAL STATUS PRIOR TO ADMISSION: Patient was independent and active without use of DME. 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 HOME SUPPORT PRIOR TO ADMISSION: The patient lived with  but did not require assist.    Physical Therapy Goals  Initiated 2/9/2021    1. Patient will move from supine to sit and sit to supine  in bed with independence within 4 days. 2. Patient will perform sit to stand with modified independence within 4 days. 3. Patient will ambulate with modified independence for 100 feet with the least restrictive device within 4 days. 4. Patient will ascend/descend 4 stairs with 1 handrail(s) with minimal assistance/contact guard assist within 4 days. 5. Patient will perform home exercise program per protocol with independence within 4 days. 6. Patient will demonstrate AROM 0-90 degrees in operative joint within 4 days. Outcome: Progressing Towards Goal    PHYSICAL THERAPY TREATMENT  Patient: Ashely Rosenthal (88 y.o. female)  Date: 2/10/2021  Diagnosis: Primary osteoarthritis of right knee [M17.11] <principal problem not specified>  Procedure(s) (LRB):  RIGHT TOTAL KNEE ARTHROPLASTY - ANGEL (ANES SPINAL WITH REGIONAL BLOCK) (Right) 1 Day Post-Op  Precautions: WBAT, Fall  Chart, physical therapy assessment, plan of care and goals were reviewed. ASSESSMENT  Patient continues with skilled PT services and is progressing towards goals. Pt is currently modified independent-SBA for bed mobility tasks and CGA for functional transfers. Pt ambulated increased walking distance of 100ft with CGA demonstrating antalgic gait pattern and verbalizing 8/10 pain. Pt successfully ascended/descended 8 stairs with and without SPC demonstrating competence with appropriate sequencing of BLE. Pt reported comfortable and confident in negotiating home environment.  Pt is cleared from physical therapy and can safely return home. Current Level of Function Impacting Discharge (mobility/balance): Modified independent-SBA bed mobility, CGA functional transfers and ambulation, CGA for stairs    Other factors to consider for discharge: Strong family support, high PLOF         PLAN :  Patient continues to benefit from skilled intervention to address the above impairments. Continue treatment per established plan of care. to address goals. Recommendation for discharge: (in order for the patient to meet his/her long term goals)  Outpatient physical therapy follow up recommended for improving R knee AROM/PROM and strengthening of RLE. This discharge recommendation:  Has been made in collaboration with the attending provider and/or case management    IF patient discharges home will need the following DME: patient owns DME required for discharge       SUBJECTIVE:   Patient stated I feel my knee much more today than yesterday.     OBJECTIVE DATA SUMMARY:   Critical Behavior:  Neurologic State: Alert  Orientation Level: Oriented X4  Cognition: Appropriate decision making  Safety/Judgement: Good awareness of safety precautions    Range of Motion:   R knee AROM 5-60 degrees                         Functional Mobility Training:  Bed Mobility:     Supine to Sit: Modified independent     Scooting: Stand-by assistance        Transfers:  Sit to Stand: Assist x1;Contact guard assistance; Pt affirms assistance of family at time of discharge. Stand to Sit: Contact guard assistance;Assist x1         Pt education provided regarding safe sequencing for car transfers and pt verbalized understanding. Balance:  Sitting: Intact; Without support  Standing: Intact; With support  Ambulation/Gait Training:  Distance (ft): 100 Feet (ft)  Assistive Device: Gait belt;Walker, rolling  Ambulation - Level of Assistance: Contact guard assistance;Assist x1        Gait Abnormalities: Antalgic; Step to gait  Right Side Weight Bearing: As tolerated     Base of Support: Shift to left  Stance: Right decreased  Speed/Inés: Pace decreased (<100 feet/min)  Step Length: Right shortened    Stairs:  Number of Stairs Trained: 8   4 stairs with B hand rails/4 stairs with single hand rail and SPC  Stairs - Level of Assistance: Contact guard assistance;Assist X1   Rail Use: Right      Exercises: verbally reviewed LE exercises. Pt verbalizes appropriate techniques and declines performance with PT. Pain Ratin/10  Activity reduced pain, pt declined application of cold pack at conclusion of session    Activity Tolerance:   Good and SpO2 stable on RA    After treatment patient left in no apparent distress:   Sitting in chair, Call bell within reach and Bed / chair alarm activated    COMMUNICATION/COLLABORATION:   The patients plan of care was discussed with: Registered nurse.      Wendy Rodriguez, SPT   Time Calculation: 26 mins    Co-Signed by Carol Gaona, PT, DPT, Eugene Wilcox

## 2021-02-10 NOTE — PROGRESS NOTES
The patient was provided a [employer] link to view the pre-operative Joint Replacement Class Video  A Patient Education Book specific to total hip or knee joint replacement surgery was given to the patient in [facility]. The content of the class was presented using an audio power point presentation specific for patients undergoing total hip and knee replacement surgery. Incentive spirometer and CHG bath kits were verbally reviewed. Day of surgery routine and expectations, hospital routine and expectations, nutrition, alcohol, nicotine, medications, infection control, pain management, DVT precautions and equipment, ice therapy, durable medical equipment, exercises, mobility expectations and precautions, home preparation and safety were reviewed in class video. My contact information was shared with the patient to provide further information as requested by the patient related to their upcoming surgery. Patient states she viewed the joint class video.

## 2021-02-10 NOTE — DISCHARGE INSTRUCTIONS
TOTAL JOINT REPLACEMENT POST OPERATIVE INSTRUCTIONS   Follow-Up Appointment:  o You should already be scheduled for a 2-week follow-up appointment, but if you are unsure about your follow-up date, please call our office at [phone]. o If you do not have this appointment, it should be scheduled 2 weeks from the date of your surgery     Activity:  o Unless you have been specifically instructed otherwise, you can advance your activity as tolerated.   o You have no hip precautions. Be sure any physical therapist who is working with you understands this and encourage them to call my office with any questions.   o You may also bear weight fully on your hip with a walker and transition to a cane or crutch as soon as you feel comfortable and strong enough. That being said, advance your activity in a stepwise and cautious manner. You will likely feel better than your hip is ready for.    o Please refrain from any high level activities until we see you back at your follow up appointment. This includes golfing, exercising, and other more intense activities. o Perform your exercises as often as possible, at least 2 times a day.  o Application of the ice packs will prevent and treat inflammation and reduce pain and swelling. You should ice the incisional area at least 3 times a day, 20-30 minutes at a time.  Dressings / Wound Care:  o Your waterproof dressing should be removed between 7-10 days from surgery. You can do this yourself or your home therapy personnel can do it for you.  o Dermabond (skin glue) may be used to help close the incision, which appears as a thin, transparent covering over the incision. Do not pick or pull at this covering, this will fall off naturally within 2-3 weeks. o Do not apply antibiotic ointment to your incisions. o Once your waterproof dressing has been removed, you may change bandages daily if you like or leave open to air.   These can be purchased at your local pharmacy. o Most incisions are closed with absorbable sutures but if not the sutures or staples will be removed at your 2 week follow up.  Showering / Bathing:  o If your incision is dry without drainage you may shower following your discharge home. The dressing is waterproof as long as well affixed to skin.  o You may shower with the waterproof dressing in place, but dry dressings should be removed before showering.   o It is fine to have water run over the incision after the waterproof dressing has been removed. o Do not vigorously scrub your incision. o Do not take a bath or get into a swimming pool / SocialRep until you follow up with Dr. Pantera Charles. o Do not soak your incision under water for 6 weeks. o If there is continued drainage or you are concerned contact Dr. Stefania Peres office prior to showering [phone] ext. [phone]     Diet:  o You may advance to your regular diet as tolerated. o Proper nutrition is crucial to healing. Make sure you are eating as healthy as possible and following a balanced diet after your surgery. o Avoid processed foods and eat mainly fruits and vegetables.  Medications:  o It is our goal to keep you as comfortable as possible after your surgery. Please take your medications as prescribed without exceeding the recommended dosage. o It is also important to understand that pain has a cycle. It begins and increases until medication interrupts it. The aim of good pain control is to stop the pain before it becomes intolerable. The key is to stay ahead of the pain.  o We encourage patients to discontinue pain medications as soon as possible after surgery. o The side effects of these medications can be substantial and the narcotic medications are not mandatory. You may substitute a prescribed narcotic with over-the-counter Tylenol. Norco has Tylenol in it. Do not take both Tylenol and Norco together.  Do not exceed more than 4000mg per day of Tylenol.  o We along with your narcotic will likely give you two other pain medications Celebrex and tramadol. o Pain medications may cause constipation- Colace twice daily and Miralax while taking the narcotic medication should help prevent constipation. o Other possible side effects of pain medication include dizziness, headache, nausea, vomiting, and urinary retention. o Discontinue the pain medication if you develop itching, rash, shortness of breath, or difficulties swallowing. If these symptoms become severe or are not relieved by discontinuing the medication, you should seek immediate medical attention. o Refills of pain medication are authorized during office hours only (8 AM- 5 PM  Monday through Friday). Our office will not prescribe narcotics beyond 6 weeks from the date of your surgery. o Narcotics will not be called into the pharmacy and, in most cases, you must be seen clinically.  Blood Thinner:  o You will be given a prescription for either Aspirin or Xarelto based on your health history.  o You should take these medications as prescribed for 30 days following your surgery and then an 81mg for 2 additional months if you don't already take one.  Driving:  o You should not return to driving until you are off all narcotic pain medications and able to safely and quickly apply the brakes. This is normally 2-4 weeks for left sided joint replacements and 2-6 weeks for right-sided joint replacements.  Airports and metal detectors  o ID cards used to be routinely given after joint replacement, however they do not save you any time and aren't helpful to TSA or security. Most joint replacements do not set off metal detectors. If the detector is set off, just tell the  you have a joint replacement.  Signs/Symptoms of Concern: Contact Dr. Nydia Zee office if any of the following signs or symptoms develop.  Please be advised if a problem arises which you feel requires immediate medical attention you should seek medical attention at the closest ER. o Temperature greater than 101.5 for more than 8 hrs  o Fever and/or chills that persist for greater than 8 hr.  o A sudden increase in pain/swelling/ or tenderness in the back of your calf or thigh that isn't relieved with ice/elevation and pain medication. o Increased drainage from your incision, increased redness or warmth at the area of your incision or a sudden increase in swelling or redness that persists despite ice and elevation     Feedback  o I want to provide the very best care for you. Feel free to email me comments and suggestions on how I can improve the experience for you and future patients. I will be here with you every step of the way. It is my privilege to be your surgeon. Email: Theresa Ashton@"[email or web address]. webme or Website: [place]  SPECIAL TOTAL KNEE INSTRUCTIONS  1. Full extension at the knee is the most important aspect of your range of motion. Avoid placing a pillow or bump behind the knee. Rather, place the heel up on a bump or pillow and allow gravity to help straighten the knee. 2. You may weight bear as tolerated on the knee and during the day you should bend the knee as much as possible. 3. Drainage from the incision more than 4 days from surgery is concerning. Contact my office if there is any question [phone] ext. [phone].  4. Steps to take for knee drainage :  a. Keep the knee fully extended, avoid knee flexion beyond 45 degrees until the drainage stops. b. Apply gauze dressing over the incision followed by an ace-wrap applied snugly  c. Hold physical therapy  d. If the wound continues to drain after 4-6 hours, call the office and hold all blood thinners.

## 2021-02-10 NOTE — FACE TO FACE
Rounded on patient, f/u from Joint Pre-op Patient Education Class. Patient states class information was valuable in preparing for surgery. Patient states their home space is prepared and safe for recovery. Reviewed ice application, exercises and incentive spirometry use. Questions answered.

## 2021-02-10 NOTE — PROGRESS NOTES
2/10/2021  9:07 AM  Reason for Admission: Elective - Primary OA right knee requiring Surgery    Assessment:   [x]In person with pt   []Via p/c with pt   []With family member in person. Who/Relation:     []With family member via p/c. Who/Relation:   []Chart Review    RUR:  8%  Risk Level: [x]Low []Moderate []High  Value-based purchasing: [] Yes [x] No  Bundle patient: [] Yes [x] No   Specify:     Advance Directive: Full Code. No AD on file. Assessment:    Age: 63    Sex: [] Male [x]Female     Residency: [x]Private residence []Apartment []Assisted Living []LTC []Other:   Exterior Steps: 6  Interior Steps: split level    Lives With: [x]With spouse []Other family members []Underage children []Alone []Care provider []Other:    Prior functioning:  [x]Independent []Dependent []Partial dependence   Pt requires assistance with: []Bathing []Dressing []Toileting []Ambulation     Prior DME required:  [x]None []RW []Cane []Crutches []Bedside commode []CPAP []Home O2 (Liter/Provider: ) []Nebulizer   []Shower Chair []Wheelchair []Hospital Bed []Hayley []Stair lift []Rollator []Other:    DME available: []None [x]RW []Cane []Crutches []Bedside commode []CPAP []Home O2 (Liter/Provider: ) []Nebulizer   []Shower Chair []Wheelchair []Hospital Bed []Hayley []Stair lift []Rollator []Other:    Rehab history: []None [x]Outpatient PT []Home Health (Provider/Date: ) []SNF (Provider/Date: ) []IPR (Provider/Date: ) []LTC (Provider/Date: )    Discharge Concerns: []Yes [x]No []Unknown   Describe: Insurer:  [facility]    PCP: Sudeep Salas MD   Name of Practice: [facility]   Current patient: [x]Yes []No   Approximate date of last visit: 8/18/20   Access to virtual PCP visits: []Yes [x]No    Pharmacy:  [id number] [facility] Transport: Family        Transition of care plan:    []Unable to determine at this time. Awaiting clinical progress, and disposition recommendations.     [] Home with outpatient follow-up    [x] Home with Outpatient PT and outpatient follow-up   Pt aware of OP appt? [x]Yes, Provider: [address] [place] [address]   []Not scheduled   Transport provider:  Family    [] Home with family assistance as needed and outpatient follow-up   Family able to assist:    Schedule:  Transport provider:      [] Home with Home Health   - Provider:     []SNF/IPR   -[]Preferences given:   []Listing provided and preferences requested   -Status: []Pending []Accepted:    -Auth required: []Yes []No    -Auth initiated date:   -3 midnight stay required: []Yes []No  Date satisfied:     [] Other:     Aurelia Bui Ma    Care Management Interventions  PCP Verified by CM: Yes([place])  Mode of Transport at Discharge:  Other (see comment)(Family)  MyChart Signup: No  Discharge Durable Medical Equipment: No  Physical Therapy Consult: Yes  Occupational Therapy Consult: No  Speech Therapy Consult: No  Current Support Network: Lives with Spouse  Confirm Follow Up Transport: Family  Discharge Location  Discharge Placement: Home with outpatient services

## 2021-02-10 NOTE — DISCHARGE SUMMARY
[place] [address]       DISCHARGE SUMMARY     Patient: Efrem Hernández                             Medical Record Number: [id number]                : 1965  Age: 63 y.o. Admit Date: 2021  Discharge Date: 2/10/2021    Admission Diagnosis: Primary osteoarthritis of right knee [M17.11]  Discharge Diagnosis: PRIMARY OA OF RIGHT KNEE    Procedures: Procedure(s):  RIGHT TOTAL KNEE ARTHROPLASTY - ANGEL (ANES SPINAL WITH REGIONAL BLOCK)    Surgeon: Mary Almaraz MD    Anesthesia: spinal  Complications: None     History of Present Illness:  Efrem Hernández is a 63 y.o. female with a history of Right knee pain, swelling, and marked loss of function. Despite conservative management and after clinical and radiographic evaluation, it was determined that she suffered from end-stage osteoarthritis and would benefit from Procedure(s):  RIGHT TOTAL KNEE ARTHROPLASTY - ANGEL ([address] [place] [address]), which she consented to undergo after a discussion of the risks, benefits, alternatives, rehab concerns, and potential complications of surgery. Hospital Course:  Kelly Ruvalcaba tolerated the procedure well. She was transferred  to the recovery room in stable condition. After a brief stay the patient was then transferred to the Joint Replacement Unit at [place] [address]. They received prophylactic intravenous antibiotics. DVT prophylaxis included SCDs, early ambulation, and ASA was started the evening of surgery. The patient was able to tolerate a regular diet and their pain was reasonably well controlled. The patient progressed well throughout the hospitalization and was deemed stable for discharge on above listed date. Disposition:  Home    Discharge Medications:  Cannot display discharge medications since this patient is not currently admitted.         My Medications      START taking these medications      Instructions Each Dose to Equal Morning Noon Evening Bedtime   aspirin delayed-release 81 mg tablet  Your last dose was: Today at [phone]  Your next dose is: 2/10/2021 Bedtime      Take 1 Tab by mouth two (2) times a day for 30 days. 81 mg         celecoxib 200 mg capsule  Commonly known as: CELEBREX  Your last dose was: February 9, 2021 6:13 AM  Your next dose is: 2/10/2021 Evening      Take 1 Cap by mouth two (2) times a day for 90 days. 200 mg         HYDROcodone-acetaminophen 5-325 mg per tablet  Commonly known as: NORCO      Take 1-2 Tabs by mouth every four (4) hours as needed for Pain for up to 7 days. Max Daily Amount: 12 Tabs. 1-2 Tab         hydrOXYzine HCL 10 mg tablet  Commonly known as: ATARAX  Your last dose was: Not taken in hospital  Your next dose is: Starting 2/10/2021 you may take every 8 hours if needed      Take 1 Tab by mouth every eight (8) hours as needed for Itching for up to 10 days. 10 mg         polyethylene glycol 17 gram packet  Commonly known as: MIRALAX  Your last dose was: February 10, 2021 12:02 PM  Your next dose is: 2/11/2021 AM      Take 1 Packet by mouth daily for 30 days. 17 g         senna-docusate 8.6-50 mg per tablet  Commonly known as: PERICOLACE  Your last dose was: February 10, 2021 12:02 PM  Your next dose is: 2/10/2021 Evening      Take 1 Tab by mouth two (2) times a day for 15 days. 1 Tab         traMADoL 50 mg tablet  Commonly known as: ULTRAM  Your last dose was: Not taken in hospital  Your next dose is: Starting 2/10/2021 you may take every 6 hours if needed      Take 1 Tab by mouth every six (6) hours as needed for Pain for up to 10 days.  Max Daily Amount: 200 mg.   50 mg            CONTINUE taking these medications      Instructions Each Dose to Equal Morning Noon Evening Bedtime   buPROPion  mg XL tablet  Commonly known as: WELLBUTRIN XL  Your last dose was: February 10, 2021 6:12 AM  Your next dose is: 2/11/2021 AM      TAKE ONE TABLET BY MOUTH EVERY MORNING          ELDERBERRY PO  Your last dose was: Not taken in hospital  Your next dose is: 2/10/2021 Resume      Take 1 Tab by mouth daily. 1 Tab         Lipitor 20 mg tablet  Generic drug: atorvastatin  Your last dose was: February 9, 2021 10:49 PM  Your next dose is: 2/10/2021 Night      Take 20 mg by mouth nightly. 20 mg         MAGNESIUM OXIDE PO  Your last dose was: Not taken in hospital  Your next dose is: 2/10/2021 Resume      Take 1 Tab by mouth daily. 1 Tab         Trulicity 9.24 VI/3.8 mL sub-q pen  Generic drug: dulaglutide  Your last dose was: Not taken in hospital  Your next dose is: 2/10/2021 Resume      0.75 mg by SubCUTAneous route every seven (7) days. Indications: thursdays   0.75 mg         Zoloft 100 mg tablet  Generic drug: sertraline  Your last dose was: February 9, 2021 10:49 PM  Your next dose is: 2/10/2021 Night      Take 100 mg by mouth nightly. 100 mg            STOP taking these medications    diclofenac 1 % Gel  Commonly known as: VOLTAREN        diclofenac EC 75 mg EC tablet  Commonly known as: VOLTAREN        MOTRIN PO              Where to Get Your Medications      These medications were sent to [id number] - [place], [address]  [address], [place] [address]    Phone: [phone]   · aspirin delayed-release 81 mg tablet  · celecoxib 200 mg capsule  · HYDROcodone-acetaminophen 5-325 mg per tablet  · hydrOXYzine HCL 10 mg tablet  · polyethylene glycol 17 gram packet  · senna-docusate 8.6-50 mg per tablet  · traMADoL 50 mg tablet           Patient Instructions: The patient will notify me for any increased bleeding, drainage, or progressively worsening pain, or other concerning symptoms. They have been instructed to report to the emergency room immediately for any chest pain or shortness of breath. Activity: WBAT on operative leg    DVT prophylaxis: ASA 81mg PO BIDx 30 days    Wound Care: The patient has a waterproof dressing in place.  They can shower with the dressing in place. They will remove the dressing in 7-10 days. At that time, they can get the incision wet in the shower, and will pat dry afterwards. Follow-up visit at [address] [place] [address] for 2 week post-op. Signed:  Shayla Beltrán PA-C  2/10/2021      [place] [place], [place]  Supervising Physician: Dr. Lorna Mcneil.  [address] [place] [address] [phone]  Medical Staff : Luz Daniel  Office : [phone]

## 2021-02-11 ENCOUNTER — PATIENT OUTREACH (OUTPATIENT)
Dept: OTHER | Age: 56
End: 2021-02-11

## 2021-02-11 NOTE — PROGRESS NOTES
Care Transitions Initial Follow Up Call    Call within 2 business days of discharge: Yes     Patient: Oleksandr Gomes Patient : 1965 MRN: [id number]    Last Discharge [address]       Complaint Diagnosis Description Type Department Provider    21  History of total left knee replacement Admission (Discharged) [id number] Nathanael Esposito MD        [facility] for a scheduled procedure. Admission dates: 21 - 2/10/21. Procedure: Procedure(s):  RIGHT TOTAL KNEE ARTHROPLASTY - ANGEL (ANES SPINAL WITH REGIONAL BLOCK)  Surgeon: Pippa Salas MD    Pt reported she is doing ok, but does report post-op pain 8/10 on pain scale with pain med. Reported dressing is dry and intact and denies s/s of infection. Patient denies C/P, SOB, cough, wheezing, fever, pain/swelling of legs or feet other than surgical leg, diarrhea or difficulty urinating. Pt reported last BM was 21. Advised to use Miralax daily. Pt reported intermittent nausea, worse with ambulation. Reported taking pain med with food. Appetite/hydration fair. Challenges to be reviewed by the provider   Additional needs identified to be addressed with provider yes  advance care planning- not on file and not checking BS    Discussed COVID-19 related testing which was available at this time. Test results were negative. Patient informed of results, if available? yes        Method of communication with provider : none    Advance Care Planning:   Does patient have an Advance Directive: not on file     Inpatient Readmission Risk score: Unplanned Readmit Risk Score: 8    Was this a readmission?  no   Patient stated reason for the admission: n/a    Patients top risk factors for readmission: lack of knowledge about disease, level of motivation and medical condition diabetes  Interventions to address risk factors: Scheduled appointment with PCP-TBD in 6 mo's from last appt, Scheduled appointment with [address] 3/18/21, but will need to schedule a F/U in 7-10 days per D/C instructions, Obtained and reviewed discharge summary and/or continuity of care documents and Education of patient/family/caregiver/guardian to support self-management-advised to check BS, curretly not checking. Pt stated that she doesn't have DM, but history an med rec reflects? Care Transition Nurse (CTN) contacted the patient by telephone to perform post hospital discharge assessment. Verified name and  with patient as identifiers. Provided introduction to self, and explanation of the CTN role. CTN reviewed discharge instructions, medical action plan and red flags with patient who verbalized understanding. Were discharge instructions available to patient? yes. Reviewed appropriate site of care based on symptoms and resources available to patient including: PCP, Specialist, Benefits related nurse triage line, Urgent Care Clinics, [facility], When to call 911, [employer] and [employer]. Patient given an opportunity to ask questions and does not have any further questions or concerns at this time. The patient agrees to contact the PCP office for questions related to their healthcare. Medication reconciliation was performed with patient, who verbalizes understanding of administration of home medications. Advised obtaining a 90-day supply of all daily and as-needed medications. Referral to Pharm D needed: no     Home Health/Outpatient orders at discharge: [facility]: n/a out pt PT ONLY  Date of initial visit: 21    Durable Medical Equipment ordered at discharge: Cane/Walker/Crutches  [address] received: walker    Covid Risk Education    Patient has following risk factors of: diabetes. Education provided regarding infection prevention, and signs and symptoms of COVID-19 and when to seek medical attention with patient who verbalized understanding.  Discussed exposure protocols and quarantine From [facility]: Are you at higher risk for severe illness?  and given an opportunity for questions and concerns. The patient agrees to contact the COVID-19 hotline [phone] or PCP office for questions related to COVID-19. For more information on steps you can take to protect yourself, see CDC's How to Protect Yourself     Patient/family/caregiver given information for [employer] and agrees to enroll no  Patient's preferred e-mail: n/a  Patient's preferred phone number: n/a    Discussed follow-up appointments. If no appointment was previously scheduled, appointment scheduling offered: yes Is follow up appointment scheduled within 7 days of discharge? TBD     [facility] follow up appointment(s):   Future Appointments   Date Time Provider Esthela Napier   2/22/2021  9:15 AM Pierre Woodward, PT [facility] follow up appointment(s): surgeon - 3/8/21, but advised to call office for an earlier appt. Need to be seen in 7-10 days per d/c instructions. Plan for follow-up call in 10-14 days based on severity of symptoms and risk factors. Plan for next call: symptom management-related to post surgery, follow up appointment-surgeon and referrals-out pt PT  CTN provided contact information for future needs. Goals Addressed                 This Visit's Progress     Attends follow-up appointments as directed. PCP - TBD in Feb for 6 mo F/U  Surgeon - 3/8/21, but instructed pt to call office to schedule an earlier appt for 7-10 days per d/c instructions. Out pt PT - 2/16/21   RTW - TBD       Knowledge and adherence of prescribed medication (ie. action, side effects, missed dose, etc.). Taking prescribed meds including pain med. Advised to use Miralax to prevent constipation.  Patient verbalizes understanding of self -management goals of living with Diabetes.         Pt reported \"I don't have diabetes,\" but according to history in chart, pt has DM 2 and is on Trulicity. Pt is not checking BS    Lab Results   Component Value Date/Time    Hemoglobin A1c 5.3 01/28/2021 11:46 AM    Hemoglobin A1c (POC) 5.6 08/18/2020 08:28 AM             Supportive resources in place to maintain patient in the community (ie. Home Health, DME equipment, refer to, medication assistant plan, etc.)        [facility] - # [phone] 24/7  Nurse Access line 24/7  Be Well  [address] [facility] 97 Urgent [facility] [phone]  [facility] Now - Faith  [facility] Workday  IT - [phone]  [facility] Help - [phone]         Understands red flags post discharge. Temperature greater than 101.5 for more than 8 hrs  Fever and/or chills that persist for greater than 8 hr. A sudden increase in pain/swelling/ or tenderness in the back of your calf or thigh that isn't relieved with ice/  elevation and pain medication.   Increased drainage from your incision, increased redness or warmth at the area of your incision or a sudden  increase in swelling or redness that persists despite ice and elevation

## 2021-02-16 ENCOUNTER — TELEPHONE (OUTPATIENT)
Dept: SURGERY | Age: 56
End: 2021-02-16

## 2021-02-16 NOTE — TELEPHONE ENCOUNTER
Post Discharge Phone placed to patient after Joint Replacement surgery   Spoke with patient states she ids doing well  Patient is taking tramadol, threw away the hydrocodone by mistake, will call  For more tramadol  States pain is tolerable and pain medication is effective.    Patient was able to fill prescriptions, no medication questions    States discharge instructions were clear and easy to understand has no questions  Patient is using a walker without difficulty, moving every hour  Able to do exercises regularly  Bruising is minimal  Swelling is moderate  Patient is elevating leg and using ice with good relief  States dressing is intact without drainage, small one fell off yesterday  Denies N/V, appetite is good  Constipation is none, +BM  Follow up appointment is scheduled with surgeon   PT is scheduled Monday  Denies fever, cough, chest pain, SOB  Denies any unusual symptoms  Discussed calling surgeon or PCP for concerns  Reminded patient to fill out survey  Opportunity given for patient to ask questions

## 2021-02-22 ENCOUNTER — HOSPITAL ENCOUNTER (OUTPATIENT)
Dept: PHYSICAL THERAPY | Age: 56
Discharge: HOME | End: 2021-02-22
Payer: COMMERCIAL

## 2021-02-22 PROCEDURE — 97110 THERAPEUTIC EXERCISES: CPT | Performed by: PHYSICAL THERAPIST

## 2021-02-22 PROCEDURE — 97161 PT EVAL LOW COMPLEX 20 MIN: CPT | Performed by: PHYSICAL THERAPIST

## 2021-02-22 NOTE — PROGRESS NOTES
Physical Therapy at [facility],   a part of [address]  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 Assessment/ key information:   Kelly is a pleasant 63 y.o. female presenting to OPPT s/p R TKR performed on 21. Pt overall is showing good progress, however was limited by pain today due to lack of taking pain medication prior. She is able to ambulate with the use of an AD, needs minimal UE support for transfers, and isable to perform a step-to pattern while ascending and descending stairs. Pt is limited by impaired sensation of the RLE due to swelling. She is limited in ROM of the R knee passively and actively. She is also limited by RLE weakness. She is showing good quad contraction and performance of functional testing being about 2 weeks out since surgery. Pt would benefit from skilled PT with emphasis on improving ROM, RLE strength, and ease with functional tasks, in order to return to work and complete ADLs without pain or limitation.        Evaluation Complexity History MEDIUM  Complexity : 1-2 comorbidities / personal factors will impact the outcome/ POC ; Examination HIGH Complexity : 4+ Standardized tests and measures addressing body structure, function, activity limitation and / or participation in recreation  ;Presentation LOW Complexity : Stable, uncomplicated  ;Clinical Decision Making MEDIUM Complexity : FOTO score of 26-74  Overall Complexity Rating: LOW     Problem List: pain affecting function, decrease ROM, decrease strength, edema affecting function, impaired gait/ balance, decrease ADL/ functional abilitiies, decrease activity tolerance, decrease flexibility/ joint mobility and decrease transfer abilities   Treatment Plan may include any combination of the following: Therapeutic exercise, Therapeutic activities, Neuromuscular re-education, Physical agent/modality, Gait/balance training, Manual therapy, Patient education, Self Care training, Functional mobility training, Home safety training and Stair training  Patient / Family readiness to learn indicated by: asking questions, trying to perform skills and interest  Persons(s) to be included in education: patient (P)  Barriers to Learning/Limitations: None  Patient Goal (s): to get back to work and normal  Patient Self Reported Health Status: good  Rehabilitation Potential: good    Short Term Goals: To be accomplished in 4 weeks:  1. Pt will be able to perform knee flexion AROM to 90 degrees, in order to perform reciprocal stairs to get to her second floor. 2. Pt will be able to perform MMT of 4/5 of all RLE, in order to complete ADLs without an increase in pain. 3. Pt will be able to be independent with HEP to help with carryover outside of therapy   Long Term Goals: To be accomplished in 12 weeks:  1. Pt will be able perform knee flexion AROM to 110 degrees, in order to perform reciprocal stairs to get to her second floor. 2. Pt will be able perform all MMT of RLE to a 5/5, in order to complete ADLs without an increase in pain. 3. Pt will be able return to work and complete an entire shift, without an increase in pain   Frequency / Duration: Patient to be seen 2 times per week for 12 weeks.     Patient/ Caregiver education and instruction: self care, activity modification and exercises    [x]  Plan of care has been reviewed with PTA        Certification Period: 2/22/21-5/22/21  Jenny Macko, SPT 2/22/2021     ________________________________________________________________________    I certify that the above Therapy Services are being furnished while the patient is under my care. I agree with the treatment plan and certify that this therapy is necessary.     Physician's Signature:____________________  Date:____________Time: _________         Bobo Helms MD

## 2021-02-22 NOTE — PROGRESS NOTES
PT INITIAL EVALUATION NOTE - [facility] 2-15    Patient Name: Hao Mccall  Date:2021  : 1965  [x]  Patient  Verified  Payor: [address] / Plan: [facility] [employer] [address] [place] [address] Street / Product Type: Commerical /    In time:9:15 AM  Out time:10:00 AM  Total Treatment Time (min): 45  Total Timed Codes (min): 15  Visit #: 1     Treatment Area: Total knee replacement status, right [Z96.651]    SUBJECTIVE  Pain Level (0-10 scale): 7/10  Any medication changes, allergies to medications, adverse drug reactions, diagnosis change, or new procedure performed?: [] No    [x] Yes (see summary sheet for update)  Subjective: Had R total knee replacement on 21; Makko procedure. Current exercise regimen is: quad sets, hamstring sets, SLR, and heel slides. She is taking tramadol for pain. She is feeling muscle pain today from her R hip to tip of toe, she has not taken pain meds today, but took pain meds last night. She is currently sleeping fine. She uses a cane to go from bed to bathroom but usually uses rolling walker. 5 stairs to get into home, L handrail. 6 steps to get to second level, L handrail. Main bedroom and bathroom are on second floor. Can sit for maybe 10-20 minutes before needing to stand. Can stand for about 5 minutes.      PLOF: working; nurse   Mechanism of Injury: arthritis   Previous Treatment/Compliance: given exercises after therapy: quad sets, hamstring sets, SLR, heel slides  PMHx/Surgical Hx: N/A  Work Hx: nurse   Living Situation:  and son   Pt Goals: \"to get back to work and normal\"   Barriers: depression  Motivation: good  Substance use: not mentioned  Cognition: A & O x 3          OBJECTIVE/EXAMINATION  Posture:  Elevated shoulders in sitting, due to pain   Other Observations:  Was able to ambulate with heel strike and toe roll off   Gait and Functional Mobility: doesn't need AD for transfers   Swelling: R>L, R warm to touch but no signs of infection     Lower Extremity AROM:        R  L  Knee Flexion  72 (74 PROM)        Knee Extension Lacking 15       LOWER QUARTER   MUSCLE STRENGTH  KEY       R  L  0 - No Contraction  L1, L2 Psoas  3 p!  4+  1 - Trace   L3 Quads  3+ p!  5  2 - Poor   L4 Tib Ant  5 p!  5  3 - Fair    L5 EHL  -  -    4 - Good   S1 FHL  4+ p!  5  5 - Normal   S2 Hams  3 p!  5          MMT: See above    Neurological: Sensation Decreased: Medial knee, Medial thigh and back of the thigh  Functional Testing:    Transfers: no AD needed, UE support to lower and stand    Walking: with FWW   Stairs: step to pattern for ascending and descending of stairs     Modality rationale: decrease edema, decrease inflammation and decrease pain to improve the patients ability to ambulate out of facility without an increase in pain    Min Type Additional Details    [] Estim: []Att   []Unatt        []TENS instruct                  []IFC  []Premod   []NMES                     []Other:  []w/US   []w/ice   []w/heat  Position:  Location:    []  Traction: [] Cervical       []Lumbar                       [] Prone          []Supine                       []Intermittent   []Continuous Lbs:  [] before manual  [] after manual  []w/heat    []  Ultrasound: []Continuous   [] Pulsed at:                           []1MHz   []3MHz Location:  W/cm2:    [] Paraffin         Location:   []w/heat    []  Ice     []  Heat  []  Ice massage Position:  Location:    []  Laser  []  Other: Position:  Location:   15   [x]  Vasopneumatic Device Pressure:       [x] lo [x] med [] hi   Temperature: 34 degrees     [x] Skin assessment post-treatment:  [x]intact []redness- no adverse reaction    []redness  adverse reaction:     15 min Therapeutic Exercise:  [x] See flow sheet :   Rationale: increase ROM to improve the patients ability to complete ADLs without an increase in pain or limitation           With   [x] TE   [] TA   [] Neuro   [] SC   [] other: Patient Education: [x] Review HEP    [x] Progressed/Changed HEP based on:   [x] positioning   [x] body mechanics   [] transfers   [x] heat/ice application    [] other:        Pain Level (0-10 scale) post treatment: nothing reported above baseline     ASSESSMENT/Changes in Function:   Kelly is a pleasant 63 y.o. female presenting to OPPT s/p R TKR performed on 2/9/21. Pt overall is showing good progress, however was limited by pain today due to lack of taking pain medication prior. She is able to ambulate with the use of an AD, needs minimal UE support for transfers, and isable to perform a step-to pattern while ascending and descending stairs. Pt is limited by impaired sensation of the RLE due to swelling. She is limited in ROM of the R knee passively and actively. She is also limited by RLE weakness. She is showing good quad contraction and performance of functional testing being about 2 weeks out since surgery. Pt would benefit from skilled PT with emphasis on improving ROM, RLE strength, and ease with functional tasks, in order to return to work and complete ADLs without pain or limitation.      [x]  See Plan of Care      Eusebia Gallego, SPT  2/22/2021

## 2021-02-24 ENCOUNTER — APPOINTMENT (OUTPATIENT)
Dept: PHYSICAL THERAPY | Age: 56
End: 2021-02-24
Payer: COMMERCIAL

## 2021-02-25 ENCOUNTER — PATIENT OUTREACH (OUTPATIENT)
Dept: OTHER | Age: 56
End: 2021-02-25

## 2021-02-25 ENCOUNTER — HOSPITAL ENCOUNTER (OUTPATIENT)
Dept: PHYSICAL THERAPY | Age: 56
Discharge: HOME | End: 2021-02-25
Payer: COMMERCIAL

## 2021-02-25 PROCEDURE — 97110 THERAPEUTIC EXERCISES: CPT | Performed by: PHYSICAL THERAPIST

## 2021-02-25 PROCEDURE — 97016 VASOPNEUMATIC DEVICE THERAPY: CPT | Performed by: PHYSICAL THERAPIST

## 2021-02-25 NOTE — PROGRESS NOTES
PT DAILY TREATMENT NOTE 2-15    Patient Name: Vaishnavi Lerner  Date:2021  : 1965  [x]  Patient  Verified  Payor: [id number] / Plan: [facility] MEDICAL [address] / Product Type: Commerical /    In time:11:35 AM  Out time:12:30 PM  Total Treatment Time (min): 55  Visit #:  2    Treatment Area: Total knee replacement status, right [Z96.651]    SUBJECTIVE  Pain Level (0-10 scale): 10  Any medication changes, allergies to medications, adverse drug reactions, diagnosis change, or new procedure performed?: [x] No    [] Yes (see summary sheet for update)  Subjective functional status/changes:   [] No changes reported  Pt reported that she feels better today because she took the pain medicine.      OBJECTIVE    Modality rationale: decrease edema, decrease inflammation and decrease pain to improve the patients ability to ambulate out of the facility without an increase in pain    Min Type Additional Details       [] Estim: []Att   []Unatt    []TENS instruct                  []IFC  []Premod   []NMES                     []Other:  []w/US   []w/ice   []w/heat  Position:  Location:       []  Traction: [] Cervical       []Lumbar                       [] Prone          []Supine                       []Intermittent   []Continuous Lbs:  [] before manual  [] after manual  []w/heat    []  Ultrasound: []Continuous   [] Pulsed                       at: []1MHz   []3MHz Location:  W/cm2:    [] Paraffin         Location:   []w/heat    []  Ice     []  Heat  []  Ice massage Position:  Location:    []  Laser  []  Other: Position:  Location:   15   [x]  Vasopneumatic Device Pressure:       [] lo [x] med [] hi   Temperature: 34     [x] Skin assessment post-treatment:  [x]intact []redness- no adverse reaction    []redness  adverse reaction:         40 min Therapeutic Exercise:  [x] See flow sheet :   Rationale: increase ROM and increase strength to improve the patients ability to complete ADLs without an increase in pain     With   [x] TE   [] TA   [] Neuro   [] SC   [] other: Patient Education: [x] Review HEP    [x] Progressed/Changed HEP based on:   [x] positioning   [] body mechanics   [] transfers   [x] heat/ice application    [] other:      Other Objective/Functional Measures: Pt was able to perform standing exercises today without an increase in pain. Also performed flossing on the bike and was able to get full rotation on the last cycle. Pain Level (0-10 scale) post treatment: 2/10; \"just feel sore like I've been working it\"     ASSESSMENT/Changes in Function:   Pt had good tolerance of session today with emphasis on improving RLE mobility. Good performance on new exercises today, was able to complete multiple in standing. Good demonstration of new stretches. Pt shows improvement in gait, showing little to no compensations. Able to perform bike today, was unable to complete full cycle until the last 3 seconds. Will continue to progress bike duration and completion of full cycles. Pt would continue to benefit from skilled PT with emphasis on improving RLE mobility, strength, and balance in order to return to work without limitations. Patient will continue to benefit from skilled PT services to modify and progress therapeutic interventions, address functional mobility deficits, address ROM deficits, address strength deficits, analyze and address soft tissue restrictions, analyze and cue movement patterns and instruct in home and community integration to attain remaining goals.      [x]  See Plan of Care  []  See progress note/recertification  []  See Discharge Summary         Progress towards goals / Updated goals:  See above    PLAN  [x]  Upgrade activities as tolerated     [x]  Continue plan of care  [x]  Update interventions per flow sheet       []  Discharge due to:_  []  Other:_      Cary Smallwood, SPT 2/25/2021

## 2021-02-25 NOTE — PROGRESS NOTES
Additional needs identified to be addressed with provider no  advance care planning- not on file         Method of communication with provider : none    Care Transition Nurse (CTN) contacted the patient by telephone to follow up after admission on 21. Verified name and  with patient as identifiers. Addressed changes since last contact: symptom management-related to post surgery, follow up appointment-surgeon - 3/8/21 and [facility]  Discharge needs reviewed: PT Cane/Walker/Crutches  Follow up appointment completed? no no Was follow up appointment scheduled within 7 days of discharge? no     Advance Care Planning:   Does patient have an Advance Directive:  not on file     CTN reviewed discharge instructions, medical action plan and red flags with patient and discussed any barriers to care and/or understanding of plan of care after discharge. Discussed appropriate site of care based on symptoms and resources available to patient including: PCP, Specialist, Benefits related nurse triage line, Urgent Care Clinics, [address], When to call 911, [employer] [facility] and [address]. The patient agrees to contact the PCP office for questions related to their healthcare.      Patients top risk factors for readmission: medical condition diabetes  Interventions to address risk factors: Scheduled appointment with PCP-TBD, Scheduled appointment with Specialist-3/8/21, Obtained and reviewed discharge summary and/or continuity of care documents and Communication with home health agencies or other community services the patient is currently [facility] follow up appointment(s):   Future Appointments   Date Time Provider Esthela Napier   3/3/2021  8:30 AM Franklin Valera PT [facility]   3/8/2021  8:30 AM Katia Mittal [place]   3/10/2021  8:30 AM Franklin Valera PT [facility]   3/15/2021  8:30 AM Katia Mittal [place]   3/17/2021  8:30 AM Concepción Duke, PT [facility]   3/22/2021  8:30 AM Concepción Duke, STANLEY [facility]   3/24/2021  8:30 AM Ella Antonio [place]     Non-[facility] follow up appointment(s): surgeon - 3/8/21    Plan for follow-up call in 10-14 days based on severity of symptoms and risk factors. Plan for next call: symptom management-related to post surgery and follow up appointment-surgeon  CTN provided contact information for future needs. Goals Addressed                 This Visit's Progress     Attends follow-up appointments as directed. PCP - TBD in Feb for 6 mo F/U  Surgeon - 3/8/21, but instructed pt to call office to schedule an earlier appt for 7-10 days per d/c instructions. Out pt PT - 2/16/21   RTW - TBD    2/25/21  PCP - TBD  Surgeon - 3/8/21  RTW - TBD       Knowledge and adherence of prescribed medication (ie. action, side effects, missed dose, etc.). On track     Taking prescribed meds including pain med. Advised to use Miralax to prevent constipation.  Supportive resources in place to maintain patient in the community (ie. Home Health, DME equipment, refer to, medication assistant plan, etc.)   On track     [facility] - # [phone] 24/7  Nurse Access line 24/7  Be Well  [address] [facility] [address] Urgent [facility] [phone]  [employer] Now - Faith  [facility] Workday  IT - [phone]  [facility] Help - 1- [phone]         Understands red flags post discharge. On track     Temperature greater than 101.5 for more than 8 hrs  Fever and/or chills that persist for greater than 8 hr. A sudden increase in pain/swelling/ or tenderness in the back of your calf or thigh that isn't relieved with ice/  elevation and pain medication.   Increased drainage from your incision, increased redness or warmth at the area of your incision or a sudden  increase in swelling or redness that persists despite ice and elevation    2/25/21 denies red flags

## 2021-03-01 ENCOUNTER — APPOINTMENT (OUTPATIENT)
Dept: PHYSICAL THERAPY | Age: 56
End: 2021-03-01
Payer: COMMERCIAL

## 2021-03-03 ENCOUNTER — HOSPITAL ENCOUNTER (OUTPATIENT)
Dept: PHYSICAL THERAPY | Age: 56
Discharge: HOME | End: 2021-03-03
Payer: COMMERCIAL

## 2021-03-03 PROCEDURE — 97110 THERAPEUTIC EXERCISES: CPT | Performed by: PHYSICAL THERAPIST

## 2021-03-03 PROCEDURE — 97016 VASOPNEUMATIC DEVICE THERAPY: CPT | Performed by: PHYSICAL THERAPIST

## 2021-03-03 NOTE — PROGRESS NOTES
PT DAILY TREATMENT NOTE 2-15    Patient Name: Leo Ma  Date:3/3/2021  : 1965  [x]  Patient  Verified  Payor: [id number] / Plan: [facility] MEDICAL [address] / Product Type: Commerical /    In time:8:35 AM  Out time:9:40 AM  Total Treatment Time (min): 65  Visit #:  3    Treatment Area: Total knee replacement status, right [Z96.651]    SUBJECTIVE  Pain Level (0-10 scale): 4/10; hamstring and calf  Any medication changes, allergies to medications, adverse drug reactions, diagnosis change, or new procedure performed?: [x] No    [] Yes (see summary sheet for update)  Subjective functional status/changes:   [] No changes reported  Pt reported that her pain today isn't in her knee but in her hamstring and calf.      OBJECTIVE    Modality rationale: decrease inflammation and decrease pain to improve the patients ability to ambulate out of the facility without an increase in pain   Min Type Additional Details       [] Estim: []Att   []Unatt    []TENS instruct                  []IFC  []Premod   []NMES                     []Other:  []w/US   []w/ice   []w/heat  Position:  Location:       []  Traction: [] Cervical       []Lumbar                       [] Prone          []Supine                       []Intermittent   []Continuous Lbs:  [] before manual  [] after manual  []w/heat    []  Ultrasound: []Continuous   [] Pulsed                       at: []1MHz   []3MHz Location:  W/cm2:    [] Paraffin         Location:   []w/heat    []  Ice     []  Heat  []  Ice massage Position:  Location:    []  Laser  []  Other: Position:  Location:    15   [x]  Vasopneumatic Device Pressure:       [] lo [x] med [] hi   Temperature: 34     [x] Skin assessment post-treatment:  [x]intact []redness- no adverse reaction    []redness  adverse reaction:         45 min Therapeutic Exercise:  [x] See flow sheet :   Rationale: increase ROM and increase strength to improve the patients ability to complete ADLs without an increase in pain     5 min Manual Therapy:  Hamstring and calf palpation   Rationale: increase ROM and increase tissue extensibility  to improve the patients ability to complete HEP without an increase in pain          With   [x] TE   [] TA   [] Neuro   [] SC   [] other: Patient Education: [x] Review HEP    [x] Progressed/Changed HEP based on:   [x] positioning   [] body mechanics   [] transfers   [x] heat/ice application    [] other:      Other Objective/Functional Measures: Pt was able to complete forward/backward weight shifts for full 10 reps today. Pain Level (0-10 scale) post treatment: nothing reported above baseline    ASSESSMENT/Changes in Function:   Pt had good tolerance of session today with emphasis on improving ROM and mobility, in order to complete ADLs without an increase in pain. Good performance of exercises today, was able to perform 10 forward/backward weight shifts this session. Palpated R posterior chain today, pt was tender to palpation of calf muscles and popliteal fossa. There was swelling present. Pt was educated on importance of stretching, ice, and elevation to decrease swelling and pain. Overall, pt is showing progress in therapy but continues to be limited by pain. Will continue to benefit from skilled PT with emphasis on increasing ROM and strength, in order to improve QOL. Patient will continue to benefit from skilled PT services to modify and progress therapeutic interventions, address functional mobility deficits, address ROM deficits, address strength deficits, analyze and address soft tissue restrictions, analyze and cue movement patterns, analyze and modify body mechanics/ergonomics and instruct in home and community integration to attain remaining goals.      [x]  See Plan of Care  []  See progress note/recertification  []  See Discharge Summary         Progress towards goals / Updated goals:  See above    PLAN  [x]  Upgrade activities as tolerated     [x] Continue plan of care  [x]  Update interventions per flow sheet       []  Discharge due to:_  []  Other:_      Sherrie Long, SPT 3/3/2021      3 TE  1 VD

## 2021-03-08 ENCOUNTER — HOSPITAL ENCOUNTER (OUTPATIENT)
Dept: PHYSICAL THERAPY | Age: 56
Discharge: HOME | End: 2021-03-08
Payer: COMMERCIAL

## 2021-03-08 PROCEDURE — 97016 VASOPNEUMATIC DEVICE THERAPY: CPT

## 2021-03-08 PROCEDURE — 97110 THERAPEUTIC EXERCISES: CPT

## 2021-03-08 NOTE — PROGRESS NOTES
PT DAILY TREATMENT NOTE 2-15    Patient Name: Prabhu Stewart  Date:3/8/2021  : 1965  [x]  Patient  Verified  Payor: [id number] / Plan: [facility] MEDICAL [place] [address] / Product Type: Commerical /    In time:8:30a  Out time: 9:30a  Total Treatment Time (min): 60  Visit #:  4    Treatment Area: Total knee replacement status, right [Z96.651]    SUBJECTIVE  Pain Level (0-10 scale): 2-3/10  Any medication changes, allergies to medications, adverse drug reactions, diagnosis change, or new procedure performed?: [x] No    [] Yes (see summary sheet for update)  Subjective functional status/changes:   [] No changes reported  Patient reports she feels a little better, but continues to have gastroc and hamstring tightness and discomfort. Patient reports pain is not constant.     OBJECTIVE    Modality rationale: decrease inflammation and decrease pain to improve the patients ability to ambulate out of the facility without an increase in pain   Min Type Additional Details       [] Estim: []Att   []Unatt    []TENS instruct                  []IFC  []Premod   []NMES                     []Other:  []w/US   []w/ice   []w/heat  Position:  Location:       []  Traction: [] Cervical       []Lumbar                       [] Prone          []Supine                       []Intermittent   []Continuous Lbs:  [] before manual  [] after manual  []w/heat    []  Ultrasound: []Continuous   [] Pulsed                       at: []1MHz   []3MHz Location:  W/cm2:    [] Paraffin         Location:   []w/heat    []  Ice     []  Heat  []  Ice massage Position:  Location:    []  Laser  []  Other: Position:  Location:    15   [x]  Vasopneumatic Device Pressure:       [] lo [x] med [] hi   Temperature: 34     [x] Skin assessment post-treatment:  [x]intact []redness- no adverse reaction    []redness  adverse reaction:         45 min Therapeutic Exercise:  [x] See flow sheet :   Rationale: increase ROM and increase strength to improve the patients ability to complete ADLs without an increase in pain           With   [x] TE   [] TA   [] Neuro   [] SC   [] other: Patient Education: [x] Review HEP    [x] Progressed/Changed HEP based on:   [x] positioning   [] body mechanics   [] transfers   [x] heat/ice application    [] other:      Other Objective/Functional Measures: HEP updated    R Knee AROM:  Flexion: 77 deg, AAROM: 80 deg  Extension:  5 deg lacking    Pain Level (0-10 scale) post treatment: 2    ASSESSMENT/Changes in Function:       Patient will continue to benefit from skilled PT services to modify and progress therapeutic interventions, address functional mobility deficits, address ROM deficits, address strength deficits, analyze and address soft tissue restrictions, analyze and cue movement patterns, analyze and modify body mechanics/ergonomics and instruct in home and community integration to attain remaining goals. []  See Plan of Care  [x]  See progress note/recertification  []  See Discharge Summary         Progress towards goals / Updated goals:  Patient has attended 4 therapy visit and is making steady gains in ROM. Patient continues to have increased tightness and pain posterior knee along the popliteal fosa and gastroc. Short Term Goals: To be accomplished in 4 weeks:  1. Pt will be able to perform knee flexion AROM to 90 degrees, in order to perform reciprocal stairs to get to her second floor. Progressing  2. Pt will be able to perform MMT of 4/5 of all RLE, in order to complete ADLs without an increase in pain. Prgoressing  3. Pt will be able to be independent with HEP to help with carryover outside of therapy Met  Long Term Goals: To be accomplished in 12 weeks:  1. Pt will be able perform knee flexion AROM to 110 degrees, in order to perform reciprocal stairs to get to her second floor. 2. Pt will be able perform all MMT of RLE to a 5/5, in order to complete ADLs without an increase in pain.    3. Pt will be able return to work and complete an entire shift, without an increase in pain   Frequency / Duration: Patient to be seen 2 times per week for 12 weeks.     PLAN  [x]  Upgrade activities as tolerated     [x]  Continue plan of care  [x]  Update interventions per flow sheet       []  Discharge due to:_  []  Other:_      Cherise Junior PTA 3/8/2021

## 2021-03-08 NOTE — PROGRESS NOTES
Physical Therapy at [place] [facility],   a part of [facility]  [address], [address]  [place] [address]  Phone: [phone]      Fax:  [phone]    Progress Note    Name: Kelly Ruvalcaba   : 1965   MD: Arnulfo Squirse MD       Treatment Diagnosis: Total knee replacement status, right [Z96.651]  Start of Care: 21    Visits from Start of Care: 4  Missed Visits: 0    Summary of Care: Therapeutic Exercise,  Manual Therapy, N Gait Training,  Vaso-pneumatic Compression,  Patient Education, Home Exercise Program         Assessment / Recommendations:     Patient has attended 4 therapy visit and is making steady, significant gains in ROM noted below. Patient continues to have increased tightness and pain posterior knee along the popliteal fosa and gastroc which contributes to high levels of pain.  The patient would benefit from continued PT to reach short and long term goals.    R Knee AROM:  Flexion:           77 deg, AAROM: 80 deg  Extension:       5 deg lacking     Short Term Goals: To be accomplished in 4 weeks:  1. Pt will be able to perform knee flexion AROM to 90 degrees, in order to perform reciprocal stairs to get to her second floor. Progressing  2. Pt will be able to perform MMT of 4/5 of all RLE, in order to complete ADLs without an increase in pain. Prgoressing  3. Pt will be able to be independent with HEP to help with carryover outside of therapy Met  Long Term Goals: To be accomplished in 12 weeks:  1. Pt will be able perform knee flexion AROM to 110 degrees, in order to perform reciprocal stairs to get to her second floor.   2. Pt will be able perform all MMT of RLE to a 5/5, in order to complete ADLs without an increase in pain.   3. Pt will be able return to work and complete an entire shift, without an increase in pain     Kayley Kumari, PT 3/8/2021

## 2021-03-10 ENCOUNTER — HOSPITAL ENCOUNTER (OUTPATIENT)
Dept: PHYSICAL THERAPY | Age: 56
Discharge: HOME | End: 2021-03-10
Payer: COMMERCIAL

## 2021-03-10 PROCEDURE — 97110 THERAPEUTIC EXERCISES: CPT | Performed by: PHYSICAL THERAPIST

## 2021-03-10 PROCEDURE — 97016 VASOPNEUMATIC DEVICE THERAPY: CPT | Performed by: PHYSICAL THERAPIST

## 2021-03-10 NOTE — PROGRESS NOTES
PT DAILY TREATMENT NOTE 2-15    Patient Name: Catracho Pop  Date:3/10/2021  : 1965  [x]  Patient  Verified  Payor: [id number] / Plan: [facility] MEDICAL [address] / Product Type: Commerical /    In time:8:30 AM  Out time:9:50 AM  Total Treatment Time (min): 80  Visit #:  5    Treatment Area: Total knee replacement status, right [Z96.651]    SUBJECTIVE  Pain Level (0-10 scale): 5-6/10  Any medication changes, allergies to medications, adverse drug reactions, diagnosis change, or new procedure performed?: [x] No    [] Yes (see summary sheet for update)  Subjective functional status/changes:   [] No changes reported  Pt reports that she saw her doctor's NP and he said he wasn't happy with her knee flexion. If she doesn't get 110 degrees of knee flexion by  or she needs to get manipulation under anesthesia. OBJECTIVE    Modality rationale: decrease inflammation and decrease pain to improve the patients ability to ambulate out of the facility without an increase in pain.     Min Type Additional Details       [] Estim: []Att   []Unatt    []TENS instruct                  []IFC  []Premod   []NMES                     []Other:  []w/US   []w/ice   []w/heat  Position:  Location:       []  Traction: [] Cervical       []Lumbar                       [] Prone          []Supine                       []Intermittent   []Continuous Lbs:  [] before manual  [] after manual  []w/heat    []  Ultrasound: []Continuous   [] Pulsed                       at: []1MHz   []3MHz Location:  W/cm2:    [] Paraffin         Location:   []w/heat   5 in prone hang []  Ice     [x]  Heat  []  Ice massage Position: prone  Location: back of knee/hamstring/calf    []  Laser  []  Other: Position:  Location:   15   [x]  Vasopneumatic Device Pressure:       [] lo [x] med [] hi   Temperature: 34     [x] Skin assessment post-treatment:  [x]intact []redness- no adverse reaction    []redness  adverse reaction: 65 min Therapeutic Exercise:  [x] See flow sheet :   Rationale: increase ROM and increase strength to improve the patients ability to complete ADLs without an increase in pain. With   [x] TE   [] TA   [] Neuro   [] SC   [] other: Patient Education: [x] Review HEP    [x] Progressed/Changed HEP based on:   [x] positioning   [x] body mechanics   [] transfers   [x] heat/ice application    [] other:      Other Objective/Functional Measures: Pt was able to obtain 92 degrees of knee flexion with hamstring muscle energy techniques      Pain Level (0-10 scale) post treatment: nothing reported above baseline     ASSESSMENT/Changes in Function:   Pt had good tolerance of session today with emphasis on improving RLE ROM and strength, in order to complete ADLs without an increase in pain. Good performance of strengthening exercises this session, was able to progress a few exercises without an increase in pain. Had good active knee flexion ROM this session following hamstring muscle energy techniques to reduce hamstring spasm and increase ROM. Prone hang was also performed on heat this session to help with hamstring spasm. Overall, pt is showing improvement in her ROM and strength, however continues to be limited by pain and hamstring spasm. Will continue to benefit from skilled PT with emphasis on limitations, in order to improve QOL. Patient will continue to benefit from skilled PT services to modify and progress therapeutic interventions, address functional mobility deficits, address ROM deficits, address strength deficits, analyze and address soft tissue restrictions, analyze and cue movement patterns and instruct in home and community integration to attain remaining goals.      [x]  See Plan of Care  []  See progress note/recertification  []  See Discharge Summary         Progress towards goals / Updated goals:  See above    PLAN  [x]  Upgrade activities as tolerated     [x]  Continue plan of care  [x] Update interventions per flow sheet       []  Discharge due to:_  []  Other:_      Nestor Gonzalez, SPT 3/10/2021     4TE

## 2021-03-12 ENCOUNTER — HOSPITAL ENCOUNTER (OUTPATIENT)
Dept: PHYSICAL THERAPY | Age: 56
Discharge: HOME | End: 2021-03-12
Payer: COMMERCIAL

## 2021-03-12 PROCEDURE — 97110 THERAPEUTIC EXERCISES: CPT

## 2021-03-12 PROCEDURE — 97016 VASOPNEUMATIC DEVICE THERAPY: CPT

## 2021-03-12 PROCEDURE — 97140 MANUAL THERAPY 1/> REGIONS: CPT

## 2021-03-12 NOTE — PROGRESS NOTES
PT DAILY TREATMENT NOTE 2-15    Patient Name: Chastity Morris  Date:3/12/2021  : 1965  [x]  Patient  Verified  Payor: [id number] / Plan: [facility] MEDICAL [place] [address] / Product Type: Commerical /    In time:10:50a  Out time:11:50a  Total Treatment Time (min): 60  Visit #:  6    Treatment Area: Total knee replacement status, right [Z96.651]    SUBJECTIVE  Pain Level (0-10 scale): 5/10  Any medication changes, allergies to medications, adverse drug reactions, diagnosis change, or new procedure performed?: [x] No    [] Yes (see summary sheet for update)  Subjective functional status/changes:   [] No changes reported  Patient reports compliance with HEP and continues to have increased pain hamstring and gastroc. OBJECTIVE    Modality rationale: decrease inflammation and decrease pain to improve the patients ability to ambulate out of the facility without an increase in pain.     Min Type Additional Details       [] Estim: []Att   []Unatt    []TENS instruct                  []IFC  []Premod   []NMES                     []Other:  []w/US   []w/ice   []w/heat  Position:  Location:       []  Traction: [] Cervical       []Lumbar                       [] Prone          []Supine                       []Intermittent   []Continuous Lbs:  [] before manual  [] after manual  []w/heat    []  Ultrasound: []Continuous   [] Pulsed                       at: []1MHz   []3MHz Location:  W/cm2:    [] Paraffin         Location:   []w/heat   5 in prone hang []  Ice     [x]  Heat  []  Ice massage Position: prone  Location: back of knee/hamstring/calf    []  Laser  []  Other: Position:  Location:   15   [x]  Vasopneumatic Device Pressure:       [] lo [x] med [] hi   Temperature: 34     [x] Skin assessment post-treatment:  [x]intact []redness- no adverse reaction    []redness  adverse reaction:         30 min Therapeutic Exercise:  [x] See flow sheet :   Rationale: increase ROM and increase strength to improve the patient’s ability to complete ADLs without an increase in pain.       15 min Manual Therapy: MFR and thera-stick rolling gastroc and hamstring, contract relax hamstrings, hamstring stretch   Rationale: increase ROM and increase strength to improve the patient’s ability to complete ADLs without an increase in pain.             With   [x] TE   [] TA   [] Neuro   [] SC   [] other: Patient Education: [x] Review HEP    [x] Progressed/Changed HEP based on:   [x] positioning   [x] body mechanics   [] transfers   [x] heat/ice application    [] other:      Other Objective/Functional Measures: Pt was able to obtain 92 degrees of knee flexion with hamstring muscle energy techniques      Pain Level (0-10 scale) post treatment: 3-4     ASSESSMENT/Changes in Function: Patient did well with all interventions and some improved knee flexion noted. Patient continues to demonstrates increased pain hamstring and difficulty with knee flexion. She will do well with continued focus on improving ROM and decreasing posterior knee pain.        Patient will continue to benefit from skilled PT services to modify and progress therapeutic interventions, address functional mobility deficits, address ROM deficits, address strength deficits, analyze and address soft tissue restrictions, analyze and cue movement patterns and instruct in home and community integration to attain remaining goals.     []  See Plan of Care  []  See progress note/recertification  []  See Discharge Summary         Progress towards goals / Updated goals: Patient making slow steady progress towards ROM goals.     PLAN  [x]  Upgrade activities as tolerated     [x]  Continue plan of care  [x]  Update interventions per flow sheet       []  Discharge due to:_  []  Other:_      Dalia Antonio, KASHIF 3/12/2021

## 2021-03-15 ENCOUNTER — HOSPITAL ENCOUNTER (OUTPATIENT)
Dept: PHYSICAL THERAPY | Age: 56
Discharge: HOME | End: 2021-03-15
Payer: COMMERCIAL

## 2021-03-15 ENCOUNTER — APPOINTMENT (OUTPATIENT)
Dept: PHYSICAL THERAPY | Age: 56
End: 2021-03-15
Payer: COMMERCIAL

## 2021-03-15 PROCEDURE — 97016 VASOPNEUMATIC DEVICE THERAPY: CPT

## 2021-03-15 PROCEDURE — 97140 MANUAL THERAPY 1/> REGIONS: CPT

## 2021-03-15 PROCEDURE — 97110 THERAPEUTIC EXERCISES: CPT

## 2021-03-15 NOTE — PROGRESS NOTES
PT DAILY TREATMENT NOTE 2-15    Patient Name: Esther Cooper  Date:3/15/2021  : 1965  [x]  Patient  Verified  Payor: [id number] / Plan: [facility] MEDICAL [place] [address] / Product Type: Commerical /    In time:8:30a  Out time: 9:30a  Total Treatment Time (min): 60  Visit #:  7    Treatment Area: Total knee replacement status, right [Z96.651]    SUBJECTIVE  Pain Level (0-10 scale): 5/10  Any medication changes, allergies to medications, adverse drug reactions, diagnosis change, or new procedure performed?: [x] No    [] Yes (see summary sheet for update)  Subjective functional status/changes:   [x] No changes reported      OBJECTIVE    Modality rationale: decrease inflammation and decrease pain to improve the patients ability to ambulate out of the facility without an increase in pain. Min Type Additional Details      15 [x] Estim: []Att   []Unatt    []TENS instruct                  [x]IFC  []Premod   []NMES                     []Other:  []w/US   [x]w/ice   []w/heat  Position:supine  Location: R Knee    []  Traction: [] Cervical       []Lumbar                       [] Prone          []Supine                       []Intermittent   []Continuous Lbs:  [] before manual  [] after manual  []w/heat    []  Ultrasound: []Continuous   [] Pulsed                       at: []1MHz   []3MHz Location:  W/cm2:    [] Paraffin         Location:   []w/heat   5 in prone hang []  Ice     [x]  Heat  []  Ice massage Position: prone  Location: back of knee/hamstring/calf    []  Laser  []  Other: Position:  Location:   15   [x]  Vasopneumatic Device:concurrent Pressure:       [] lo [x] med [] hi   Temperature: 34     [x] Skin assessment post-treatment:  [x]intact []redness- no adverse reaction    []redness  adverse reaction:         30 min Therapeutic Exercise:  [x] See flow sheet :   Rationale: increase ROM and increase strength to improve the patients ability to complete ADLs without an increase in pain. 15 min Manual Therapy: MFR and thera-stick rolling gastroc and hamstring, contract relax hamstrings, hamstring stretch   Rationale: increase ROM and increase strength to improve the patients ability to complete ADLs without an increase in pain. With   [x] TE   [] TA   [] Neuro   [] SC   [] other: Patient Education: [x] Review HEP    [x] Progressed/Changed HEP based on:   [x] positioning   [x] body mechanics   [] transfers   [x] heat/ice application    [] other:      Other Objective/Functional Measures: TTP HS and gastroc    Knee Flexion: 95 deg    Pain Level (0-10 scale) post treatment: 6-7    ASSESSMENT/Changes in Function: Patient did well with all interventions and some improved knee flexion noted. Patient continues to demonstrates increased pain hamstring and difficulty with knee flexion. She will do well with continued focus on improving ROM and decreasing posterior knee pain. Patient will continue to benefit from skilled PT services to modify and progress therapeutic interventions, address functional mobility deficits, address ROM deficits, address strength deficits, analyze and address soft tissue restrictions, analyze and cue movement patterns and instruct in home and community integration to attain remaining goals. []  See Plan of Care  []  See progress note/recertification  []  See Discharge Summary         Progress towards goals / Updated goals: Patient making slow steady progress towards ROM goals.      PLAN  [x]  Upgrade activities as tolerated     [x]  Continue plan of care  [x]  Update interventions per flow sheet       []  Discharge due to:_  []  Other:_      Ramya Ingram, PTA 3/15/2021

## 2021-03-17 ENCOUNTER — HOSPITAL ENCOUNTER (OUTPATIENT)
Dept: PHYSICAL THERAPY | Age: 56
Discharge: HOME | End: 2021-03-17
Payer: COMMERCIAL

## 2021-03-17 PROCEDURE — 97016 VASOPNEUMATIC DEVICE THERAPY: CPT | Performed by: PHYSICAL THERAPIST

## 2021-03-17 PROCEDURE — 97110 THERAPEUTIC EXERCISES: CPT | Performed by: PHYSICAL THERAPIST

## 2021-03-17 NOTE — PROGRESS NOTES
PT DAILY TREATMENT NOTE 2-15    Patient Name: Cam Beckham  Date:3/17/2021  : 1965  [x]  Patient  Verified  Payor: [address] / Plan: [facility] MEDICAL [address] / Product Type: Commerical /    In time:8:30 AM  Out time:9:35 AM  Total Treatment Time (min): 65  Visit #:  8    Treatment Area: Total knee replacement status, right [Z96.511]    SUBJECTIVE  Pain Level (0-10 scale): 0/10  Any medication changes, allergies to medications, adverse drug reactions, diagnosis change, or new procedure performed?: [x] No    [] Yes (see summary sheet for update)  Subjective functional status/changes:   [] No changes reported  Pt reports that her hamstring has released and she feels a lot better today. Her calf is still tight.      OBJECTIVE    Modality rationale: decrease inflammation and decrease pain to improve the patients ability to ambulate out of the facility without an increase in pain    Min Type Additional Details    15   [x] Estim: []Att   [x]Unatt    []TENS instruct                  [x]IFC  []Premod   []NMES                     []Other:  []w/US   [x]w/VD   []w/heat  Position: supine  Location: lateral calf and quad       []  Traction: [] Cervical       []Lumbar                       [] Prone          []Supine                       []Intermittent   []Continuous Lbs:  [] before manual  [] after manual  []w/heat    []  Ultrasound: []Continuous   [] Pulsed                       at: []1MHz   []3MHz Location:  W/cm2:    [] Paraffin         Location:   []w/heat    []  Ice     []  Heat  []  Ice massage Position:  Location:    []  Laser  []  Other: Position:  Location:   See above   [x]  Vasopneumatic Device Pressure:       [] lo [x] med [] hi   Temperature: 34     [x] Skin assessment post-treatment:  [x]intact []redness- no adverse reaction    []redness  adverse reaction:         50 min Therapeutic Exercise:  [x] See flow sheet :   Rationale: increase ROM and increase strength to improve the patients ability to complete ADLs without an increase in pain          With   [x] TE   [] TA   [] Neuro   [] SC   [] other: Patient Education: [x] Review HEP    [x] Progressed/Changed HEP based on:   [x] positioning   [x] body mechanics   [] transfers   [x] heat/ice application    [] other:      Other Objective/Functional Measures: Pt was able to achieve 99 degrees of knee flexion on step. Pain Level (0-10 scale) post treatment: nothing reported above baseline    ASSESSMENT/Changes in Function:   Pt had good tolerance of session today with emphasis on RLE strength and ROM, in order to complete ADLs without an increase in pain. Good performance of mobility exercises today, was able to perform knee flexion PROM on the step to 99 degrees. Good demonstration of strengthening exercises today, was able to progress higher reps this session without an increase in pain. Overall, pt is showing progress in therapy but continues to be limited by muscle spasms, preventing full ROM. Would continue to benefit from skilled PT with emphasis on improving R knee strength and mobility, in order to improve QOL. Patient will continue to benefit from skilled PT services to modify and progress therapeutic interventions, address functional mobility deficits, address ROM deficits, address strength deficits, analyze and address soft tissue restrictions, analyze and modify body mechanics/ergonomics and instruct in home and community integration to attain remaining goals.      [x]  See Plan of Care  []  See progress note/recertification  []  See Discharge Summary         Progress towards goals / Updated goals:  See above    PLAN  [x]  Upgrade activities as tolerated     [x]  Continue plan of care  [x]  Update interventions per flow sheet       []  Discharge due to:_  []  Other:_      EDUIN Gutierrez 3/17/2021    3 TE  1 VD

## 2021-03-19 ENCOUNTER — HOSPITAL ENCOUNTER (OUTPATIENT)
Dept: PHYSICAL THERAPY | Age: 56
Discharge: HOME | End: 2021-03-19
Payer: COMMERCIAL

## 2021-03-19 PROCEDURE — 97140 MANUAL THERAPY 1/> REGIONS: CPT

## 2021-03-19 PROCEDURE — 97110 THERAPEUTIC EXERCISES: CPT

## 2021-03-19 PROCEDURE — 97016 VASOPNEUMATIC DEVICE THERAPY: CPT

## 2021-03-19 NOTE — PROGRESS NOTES
PT DAILY TREATMENT NOTE 2-15    Patient Name: Akiko Cantu  Date:3/19/2021  : 1965  [x]  Patient  Verified  Payor: [id number] / Plan: [facility] MEDICAL [place] [address] / Product Type: Commerical /    In time:7:45a  Out time:8:55a  Total Treatment Time (min): 70  Visit #:  9    Treatment Area: Total knee replacement status, right [Z96.651]    SUBJECTIVE  Pain Level (0-10 scale): 2/10  Any medication changes, allergies to medications, adverse drug reactions, diagnosis change, or new procedure performed?: [x] No    [] Yes (see summary sheet for update)  Subjective functional status/changes:   [] No changes reported  Patient reports she continues to have some tightness and pain today at the proximal gastroc.     OBJECTIVE    Modality rationale: decrease inflammation and decrease pain to improve the patients ability to ambulate out of the facility without an increase in pain    Min Type Additional Details    15   [x] Estim: []Att   [x]Unatt    []TENS instruct                  [x]IFC  []Premod   []NMES                     []Other:  []w/US   [x]w/VD   []w/heat  Position: supine  Location: lateral calf and quad       []  Traction: [] Cervical       []Lumbar                       [] Prone          []Supine                       []Intermittent   []Continuous Lbs:  [] before manual  [] after manual  []w/heat    []  Ultrasound: []Continuous   [] Pulsed                       at: []1MHz   []3MHz Location:  W/cm2:    [] Paraffin         Location:   []w/heat    []  Ice     []  Heat  []  Ice massage Position:  Location:    []  Laser  []  Other: Position:  Location:   See above   [x]  Vasopneumatic Device Pressure:       [] lo [x] med [] hi   Temperature: 34     [x] Skin assessment post-treatment:  [x]intact []redness- no adverse reaction    []redness  adverse reaction:         45 min Therapeutic Exercise:  [x] See flow sheet :   Rationale: increase ROM and increase strength to improve the patients ability to complete ADLs without an increase in pain    10 min Manual Therapy: Contract relax hamstrings, hamstring stretch, passive stretch into flexion with inferior mobs, scar massage   Rationale: increase ROM and increase strength to improve the patient’s ability to complete ADLs without an increase in pain.             With   [x] TE   [] TA   [] Neuro   [] SC   [] other: Patient Education: [x] Review HEP    [x] Progressed/Changed HEP based on:   [x] positioning   [x] body mechanics   [] transfers   [x] heat/ice application    [] other:      Other Objective/Functional Measures:   Pt presents with increased TTP along proximal scar with puss scab just superior to patella with puss filled area just under scab, with scar mobs today scab loosened and clear exudate released, area dressed with bandage, pt reports some \"poking\" feeling with scar mobs just before scab opened, pt instructed to call MD to ensure no remaining stitch or infection present.    Flexion: 102 degrees    Decreased pain after exercises in gastroc    Pain Level (0-10 scale) post treatment: 0    ASSESSMENT/Changes in Function: Patient with pain relief by end of visit.      Patient will continue to benefit from skilled PT services to modify and progress therapeutic interventions, address functional mobility deficits, address ROM deficits, address strength deficits, analyze and address soft tissue restrictions, analyze and modify body mechanics/ergonomics and instruct in home and community integration to attain remaining goals.     [x]  See Plan of Care  []  See progress note/recertification  []  See Discharge Summary         Progress towards goals / Updated goals: Patient making steady progress in flexion ROM and good overall progress towards goals.     PLAN  [x]  Upgrade activities as tolerated     [x]  Continue plan of care  [x]  Update interventions per flow sheet       []  Discharge due to:_  []  Other:_      Dalia Antonio, PTA 3/19/2021

## 2021-03-22 ENCOUNTER — HOSPITAL ENCOUNTER (OUTPATIENT)
Dept: PHYSICAL THERAPY | Age: 56
Discharge: HOME | End: 2021-03-22
Payer: COMMERCIAL

## 2021-03-22 PROCEDURE — 97110 THERAPEUTIC EXERCISES: CPT | Performed by: PHYSICAL THERAPIST

## 2021-03-22 PROCEDURE — 97016 VASOPNEUMATIC DEVICE THERAPY: CPT | Performed by: PHYSICAL THERAPIST

## 2021-03-22 NOTE — PROGRESS NOTES
Physical Therapy at [facility],   a part of  [facility]  [address] [facility] T[place]  Phone: [phone]      Fax:  [phone]    Progress Note    Name: Ashely Rosenthal   : 1965   MD: Brien Corley MD       Treatment Diagnosis: Total knee replacement status, right [Z96.651]  Start of Care: 21    Visits from Start of Care: 10  Missed Visits: 0    Summary of Care: Therapeutic Exercise,  Manual Therapy, Gait Training,  Vaso-pneumatic Compression,  Patient Education, Home Exercise Program       Assessment / Recommendations: The patient has attended 10 PT visits and continues to make improvements in knee ROM and strength each week  She is compliant with HEP and has not missed a PT visit. Her pain has significantly reduced over the past 2 weeks and motion has improved. Her extension is full but flexion continues to remain limited, likely due to muscular tension in hamstring and calf where she feels most of the pain and spasm. As this pain has reduced, her knee flexion has improved. I do not believe a manipulation is indicated due to the nature of her stiffness but she will discuss this further with her MD.       R Knee AROM:  Flexion: 104     (Improved from 72 deg at initial evaluation)  Extension:  0 (Improved from lacking 15 deg at initial evaluation)     Short Term Goals: To be accomplished in 4 weeks:  1. Pt will be able to perform knee flexion AROM to 90 degrees, in order to perform reciprocal stairs to get to her second floor. MET  2. Pt will be able to perform MMT of 4/5 of all RLE, in order to complete ADLs without an increase in pain. MET  3. Pt will be able to be independent with HEP to help with carryover outside of  MET  [address] [facility] [address] be accomplished in 12 weeks:  1.  Pt will be able perform knee flexion AROM to 110 degrees, in order to perform reciprocal stairs to get to her second floor.  Progressing towards  2. Pt will be able perform all MMT of RLE to a 5/5, in order to complete ADLs without an increase in pain.  Progressing towards  3.  Pt will be able return to work and complete an entire shift, without an increase in pain  Progressing towards    Deyvi Boucher, PT 3/22/2021

## 2021-03-22 NOTE — PROGRESS NOTES
PT DAILY TREATMENT NOTE 2-15    Patient Name: Prabhu Stewart  Date:3/22/2021  : 1965  [x]  Patient  Verified  Payor: [id number] / Plan: [facility] MEDICAL [address] / Product Type: Commerical /    In time:8:30 AM  Out time:9:35 AM  Total Treatment Time (min): 65  Visit #:  10    Treatment Area: Total knee replacement status, right [Z96.651]    SUBJECTIVE  Pain Level (0-10 scale): 1/10  Any medication changes, allergies to medications, adverse drug reactions, diagnosis change, or new procedure performed?: [x] No    [] Yes (see summary sheet for update)  Subjective functional status/changes:   [] No changes reported  Pt reports that she did yard work over the weekend and didn't have pain. She did some squats while picking up branches. She didn't have any pain afterwards. OBJECTIVE    Modality rationale: decrease inflammation and decrease pain to improve the patients ability to ambulate out of the facility without an increase in pain.     Min Type Additional Details       [] Estim: []Att   []Unatt    []TENS instruct                  []IFC  []Premod   []NMES                     []Other:  []w/US   []w/ice   []w/heat  Position:  Location:       []  Traction: [] Cervical       []Lumbar                       [] Prone          []Supine                       []Intermittent   []Continuous Lbs:  [] before manual  [] after manual  []w/heat    []  Ultrasound: []Continuous   [] Pulsed                       at: []1MHz   []3MHz Location:  W/cm2:    [] Paraffin         Location:   []w/heat    []  Ice     []  Heat  []  Ice massage Position:  Location:    []  Laser  []  Other: Position:  Location:   15   [x]  Vasopneumatic Device Pressure:       [] lo [x] med [] hi   Temperature: 34     [x] Skin assessment post-treatment:  [x]intact []redness- no adverse reaction    []redness  adverse reaction:         50 min Therapeutic Exercise:  [x] See flow sheet :   Rationale: increase ROM and increase strength to improve the patients ability to complete ADLs without an increase in pain. With   [x] TE   [] TA   [] Neuro   [] SC   [] other: Patient Education: [x] Review HEP    [x] Progressed/Changed HEP based on:   [x] positioning   [x] body mechanics   [] transfers   [x] heat/ice application    [] other:      Other Objective/Functional Measures:   Knee AROM  Flexion: 105 degrees  Extension: 0 degrees     Pain Level (0-10 scale) post treatment: nothing reported above baseline     ASSESSMENT/Changes in Function:   Pt had good tolerance of session today with emphasis on improving R knee ROM and strength, in order to complete ADLs without an increase in pain. Good performance of mobility exercises today, she was able to complete knee flexion AROM to 105 degrees. Good performance of strengthening exercises today, will progress to increasing strengthening intensity. Overall, pt is showing progress in therapy but continues to be limited by hamstring/calf muscle spasm. Will continue to benefit from skilled PT with emphasis on improving RLE strength and ROM, in order to improve QOL. Patient will continue to benefit from skilled PT services to modify and progress therapeutic interventions, address functional mobility deficits, address ROM deficits, address strength deficits, analyze and cue movement patterns, analyze and modify body mechanics/ergonomics and instruct in home and community integration to attain remaining goals.      [x]  See Plan of Care  []  See progress note/recertification  []  See Discharge Summary         Progress towards goals / Updated goals:  See above     PLAN  [x]  Upgrade activities as tolerated     [x]  Continue plan of care  [x]  Update interventions per flow sheet       []  Discharge due to:_  []  Other:_      Manan Klein, EDUIN 3/22/2021     3 TE  1 VD

## 2021-03-24 ENCOUNTER — HOSPITAL ENCOUNTER (OUTPATIENT)
Dept: PHYSICAL THERAPY | Age: 56
Discharge: HOME | End: 2021-03-24
Payer: COMMERCIAL

## 2021-03-24 ENCOUNTER — PATIENT OUTREACH (OUTPATIENT)
Dept: OTHER | Age: 56
End: 2021-03-24

## 2021-03-24 PROCEDURE — 97110 THERAPEUTIC EXERCISES: CPT

## 2021-03-24 PROCEDURE — 97016 VASOPNEUMATIC DEVICE THERAPY: CPT

## 2021-03-24 NOTE — PROGRESS NOTES
PT DAILY TREATMENT NOTE 2-15    Patient Name: Esther Cooper  Date:3/24/2021  : 1965  [x]  Patient  Verified  Payor: [id number] / Plan: [facility] MEDICAL [address] / Product Type: Commerical /    In time:8:30a  Out time:9:40a  Total Treatment Time (min): 70  Visit #:  11    Treatment Area: Total knee replacement status, right [Z96.651]    SUBJECTIVE  Pain Level (0-10 scale): 3/10  Any medication changes, allergies to medications, adverse drug reactions, diagnosis change, or new procedure performed?: [x] No    [] Yes (see summary sheet for update)  Subjective functional status/changes:   [] No changes reported  Pt reports she feels a little more stiff and tight today through the hamstring and gastroc. Patient reports she had a bad night last night, just could not get comfortable. Patient states she got new shoe and they feel good. OBJECTIVE    Modality rationale: decrease inflammation and decrease pain to improve the patients ability to ambulate out of the facility without an increase in pain.     Min Type Additional Details      15 [x] Estim: []Att   [x]Unatt    []TENS instruct                  [x]IFC  []Premod   []NMES                     []Other:  []w/US   [x]w/ice   []w/heat  Position: supine  Location:knee       []  Traction: [] Cervical       []Lumbar                       [] Prone          []Supine                       []Intermittent   []Continuous Lbs:  [] before manual  [] after manual  []w/heat    []  Ultrasound: []Continuous   [] Pulsed                       at: []1MHz   []3MHz Location:  W/cm2:    [] Paraffin         Location:   []w/heat   10  []  Ice     [x]  Heat:post bike  []  Ice massage Position: seated  Location:hamstring/gastroc    []  Laser  []  Other: Position:  Location:   15   [x]  Vasopneumatic Device:concurrent with e-stim Pressure:       [] lo [x] med [] hi   Temperature: 34     [x] Skin assessment post-treatment:  [x]intact []redness- no adverse reaction    []redness  adverse reaction:         45 min Therapeutic Exercise:  [x] See flow sheet :   Rationale: increase ROM and increase strength to improve the patients ability to complete ADLs without an increase in pain. With   [x] TE   [] TA   [] Neuro   [] SC   [] other: Patient Education: [x] Review HEP    [x] Progressed/Changed HEP based on:   [x] positioning   [x] body mechanics   [] transfers   [x] heat/ice application    [] other:      Other Objective/Functional Measures:       Pain Level (0-10 scale) post treatment: 1     ASSESSMENT/Changes in Function:  Patient demonstrates increased difficulty and pain with several exercises today, however is able to tolerate interventions with decreased pain by end of visit. Patient will continue to benefit from skilled PT services to modify and progress therapeutic interventions, address functional mobility deficits, address ROM deficits, address strength deficits, analyze and cue movement patterns, analyze and modify body mechanics/ergonomics and instruct in home and community integration to attain remaining goals. [x]  See Plan of Care  []  See progress note/recertification  []  See Discharge Summary         Progress towards goals / Updated goals: Patient making steady progress towards goals and continues to improve ROM. Patient to schedule 2x a week starting in April.      PLAN  [x]  Upgrade activities as tolerated     [x]  Continue plan of care  [x]  Update interventions per flow sheet       []  Discharge due to:_  []  Other:_      Brittnee Campos, PTA 3/24/2021

## 2021-03-24 NOTE — PROGRESS NOTES
Resolving current episode YEHUDA (Transitions of care complete). No further ED/UC or hospital admissions within 30 days post discharge. Patient attended follow-up appointments as directed. No outreach from patient to [address] [place] [address].

## 2021-03-25 ENCOUNTER — OFFICE VISIT (OUTPATIENT)
Dept: INTERNAL MEDICINE CLINIC | Age: 56
End: 2021-03-25
Payer: COMMERCIAL

## 2021-03-25 VITALS
WEIGHT: 238 LBS | RESPIRATION RATE: 18 BRPM | DIASTOLIC BLOOD PRESSURE: 69 MMHG | HEART RATE: 93 BPM | SYSTOLIC BLOOD PRESSURE: 105 MMHG | TEMPERATURE: 98.5 F | OXYGEN SATURATION: 95 % | BODY MASS INDEX: 39.65 KG/M2 | HEIGHT: 65 IN

## 2021-03-25 DIAGNOSIS — E78.5 HYPERLIPIDEMIA, UNSPECIFIED HYPERLIPIDEMIA TYPE: Primary | ICD-10-CM

## 2021-03-25 DIAGNOSIS — E11.8 TYPE 2 DIABETES MELLITUS WITH COMPLICATION, WITHOUT LONG-TERM CURRENT USE OF INSULIN (HCC): ICD-10-CM

## 2021-03-25 DIAGNOSIS — F32.A ANXIETY AND DEPRESSION: ICD-10-CM

## 2021-03-25 DIAGNOSIS — Z12.12 ENCOUNTER FOR COLORECTAL CANCER SCREENING: ICD-10-CM

## 2021-03-25 DIAGNOSIS — Z12.31 BREAST CANCER SCREENING BY MAMMOGRAM: ICD-10-CM

## 2021-03-25 DIAGNOSIS — E55.9 VITAMIN D DEFICIENCY: ICD-10-CM

## 2021-03-25 DIAGNOSIS — Z12.11 ENCOUNTER FOR COLORECTAL CANCER SCREENING: ICD-10-CM

## 2021-03-25 DIAGNOSIS — F41.9 ANXIETY AND DEPRESSION: ICD-10-CM

## 2021-03-25 PROCEDURE — 99214 OFFICE O/P EST MOD 30 MIN: CPT | Performed by: INTERNAL MEDICINE

## 2021-03-25 RX ORDER — DULAGLUTIDE 0.75 MG/.5ML
0.75 INJECTION, SOLUTION SUBCUTANEOUS
Qty: 12 PEN | Refills: 2 | Status: SHIPPED
Start: 2021-03-25 | End: 2021-05-20 | Stop reason: DRUGHIGH

## 2021-03-25 RX ORDER — BUPROPION HYDROCHLORIDE 300 MG/1
TABLET ORAL
Qty: 90 TAB | Refills: 1 | Status: SHIPPED | OUTPATIENT
Start: 2021-03-25 | End: 2021-07-08 | Stop reason: SDUPTHER

## 2021-03-25 RX ORDER — DICLOFENAC SODIUM 75 MG/1
75 TABLET, DELAYED RELEASE ORAL 2 TIMES DAILY
COMMUNITY
Start: 2021-03-22 | End: 2021-11-23 | Stop reason: ALTCHOICE

## 2021-03-25 RX ORDER — DICLOFENAC SODIUM 10 MG/G
GEL TOPICAL 4 TIMES DAILY
COMMUNITY
End: 2021-11-23 | Stop reason: ALTCHOICE

## 2021-03-25 NOTE — PROGRESS NOTES
Israel West is a 63 y.o. female who presents today for Follow-up and Medication Evaluation  . She has a history of   Patient Active Problem List   Diagnosis Code    New onset of headaches after age 52 R50.7    Visual changes H53.9    Obesity, morbid ([address] E66.01    Hyperlipidemia, unspecified hyperlipidemia type E78.5    Anxiety and depression F41.9, F32.9    IFG (impaired fasting glucose) R73.01    Type 2 diabetes mellitus with complication, without long-term current use of insulin ([facility]) E11.8    Primary osteoarthritis of right knee M17.11   . Today patient here for follow-up. .     Status post total knee arthroplasty. This was done last month. Overall she reports that she is feeling ok. ROM still a bit limited. Diabetes Mellitus [id number] looks great last month. Patient's weight remains stable. Last hemoglobin a1c   Lab Results   Component Value Date/Time    Hemoglobin A1c 5.3 01/28/2021 11:46 AM    Hemoglobin A1c (POC) 5.6 08/18/2020 08:28 AM     No components found for: POCA1C, HBA1C POC  medication compliance: compliant all of the time. Last Foot Exam: UTD  Microalbuminuria:   Lab Results   Component Value Date/Time    Microalbumin/Creat ratio (mg/g creat) 6 12/17/2019 11:15 AM    Microalbumin,urine random 1.02 12/17/2019 11:15 AM     Reports that mental health is doing well. She is still on Zoloft 100 mg as well as Wellbutrin. HM: Due for MMG and CRC screening. Wants to do the cologuard. ROS  Review of Systems   Constitutional: Negative for chills, fever and weight loss. HENT: Negative for congestion and sore throat. Eyes: Negative for blurred vision, double vision and photophobia. Respiratory: Negative for cough and shortness of breath. Cardiovascular: Negative for chest pain, palpitations and leg swelling. Gastrointestinal: Negative for abdominal pain, constipation, diarrhea, heartburn, nausea and vomiting.    Genitourinary: Negative for dysuria, frequency and urgency. Musculoskeletal: Positive for joint pain (improving). Negative for myalgias. Skin: Negative for rash. Neurological: Negative. Negative for headaches. Endo/Heme/Allergies: Does not bruise/bleed easily. Psychiatric/Behavioral: Negative for memory loss and suicidal ideas. Visit Vitals  /69   Pulse 93   Temp 98.5 °F (36.9 °C) (Oral)   Resp 18   Ht 5' 5\" (1.651 m)   Wt 238 lb (108 kg)   SpO2 95%   BMI 39.61 kg/m²       Physical Exam  Constitutional:       Appearance: She is well-developed. HENT:      Head: Normocephalic and atraumatic. Right Ear: Tympanic membrane normal.      Left Ear: Tympanic membrane normal.      Nose: Nose normal.   Neck:      Musculoskeletal: Normal range of motion. Cardiovascular:      Rate and Rhythm: Normal rate and regular rhythm. Heart sounds: No murmur. Pulmonary:      Effort: Pulmonary effort is normal. No respiratory distress. Abdominal:      General: Abdomen is flat. Palpations: Abdomen is soft. Musculoskeletal:      Comments: Healing incision to right knee. Mildly warm to the touch. Skin:     General: Skin is warm and dry. Neurological:      Mental Status: She is alert and oriented to person, place, and time. Psychiatric:         Behavior: Behavior normal.           Current Outpatient Medications   Medication Sig    diclofenac EC (VOLTAREN) 75 mg EC tablet Take 75 mg by mouth two (2) times a day.  diclofenac (VOLTAREN) 1 % gel Apply  to affected area four (4) times daily.  sertraline (Zoloft) 100 mg tablet Take 100 mg by mouth nightly.  atorvastatin (Lipitor) 20 mg tablet Take 20 mg by mouth nightly.  elderberry fruit (ELDERBERRY PO) Take 1 Tab by mouth daily.  MAGNESIUM OXIDE PO Take 1 Tab by mouth daily.  dulaglutide (Trulicity) 4.90 OK/3.5 mL sub-q pen 0.75 mg by SubCUTAneous route every seven (7) days.  Indications: thursdays    buPROPion XL (WELLBUTRIN XL) 300 mg XL tablet TAKE ONE TABLET BY MOUTH EVERY MORNING    celecoxib (CELEBREX) 200 mg capsule Take 1 Cap by mouth two (2) times a day for 90 days. No current facility-administered medications for this visit. 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    HX WISDOM TEETH EXTRACTION        Social History     Tobacco Use    Smoking status: Never Smoker    Smokeless tobacco: Never Used   Substance Use Topics    Alcohol use: Yes     Comment: rare      Family History   Problem Relation Age of Onset    Cancer Father         bladder and skin cancer    Elevated Lipids Father     Stroke Father         x 2    Dementia Mother     Anesth Problems Neg Hx     Clotting Disorder Neg Hx         Allergies   Allergen Reactions    Levaquin [Levofloxacin] Rash    Pcn [Penicillins] Rash    Percocet [Oxycodone-Acetaminophen] Nausea and Vomiting     \"It upsets my stomach. \"        Assessment/Plan  Diagnoses and all orders for this visit:    1. Hyperlipidemia, unspecified hyperlipidemia type-due for repeat testing.    -     LIPID PANEL; Future    2. Type 2 diabetes mellitus with complication, without long-term current use of insulin ([address]- Very well controlled. This is also helping her maintain her weight loss. Will refill. Off Metformin.  -     dulaglutide (Trulicity) 2.57 XO/4.4 mL sub-q pen; 0.5 mL by SubCUTAneous route every seven (7) days. 3. Anxiety and depression-mental health is doing well. Continue current therapies.   -     buPROPion XL (WELLBUTRIN XL) 300 mg XL tablet; TAKE ONE TABLET BY MOUTH EVERY MORNING    4. Vitamin D deficiency-repeat levels  -     VITAMIN D, 25 HYDROXY; Future    5. Breast cancer screening by mammogram  -     PELON MAMMO BI SCREENING INCL CAD; Future    6. Encounter for colorectal cancer screening  -     COLOGUARD TEST (FECAL DNA COLORECTAL CANCER SCREENING)            Cory Perkins MD  3/25/2021    This note was created with the help of speech recognition software [employer]) and may contain some 'sound alike' errors.

## 2021-03-25 NOTE — PROGRESS NOTES
Chastity Morris is a 63 y.o. female  Chief Complaint   Patient presents with    Follow-up    Medication Evaluation     Health Maintenance Due   Topic Date Due    Hepatitis C Screening  Never done    Pneumococcal 0-64 years (1 of 1 - PPSV23) Never done    Eye Exam Retinal or Dilated  Never done    COVID-19 Vaccine (1) Never done    Shingrix Vaccine Age 50> (1 of 2) Never done    Colorectal Cancer Screening Combo  Never done    Breast Cancer Screen Mammogram  Never done    PAP AKA CERVICAL CYTOLOGY  01/05/2020    Foot Exam Q1  08/27/2020    Flu Vaccine (1) 09/01/2020    MICROALBUMIN Q1  12/17/2020    Lipid Screen  12/17/2020     Visit Vitals  /69   Pulse 93   Temp 98.5 °F (36.9 °C) (Oral)   Resp 18   Ht 5' 5\" (1.651 m)   Wt 238 lb (108 kg)   SpO2 95%   BMI 39.61 kg/m²

## 2021-03-26 DIAGNOSIS — E55.9 VITAMIN D DEFICIENCY: Primary | ICD-10-CM

## 2021-03-26 LAB
25(OH)D3 SERPL-MCNC: 12.7 NG/ML (ref 30–100)
CHOLEST SERPL-MCNC: 215 MG/DL
HDLC SERPL-MCNC: 75 MG/DL
HDLC SERPL: 2.9 {RATIO} (ref 0–5)
LDLC SERPL CALC-MCNC: 118.8 MG/DL (ref 0–100)
LIPID PROFILE,FLP: ABNORMAL
TRIGL SERPL-MCNC: 106 MG/DL (ref ?–150)
VLDLC SERPL CALC-MCNC: 21.2 MG/DL

## 2021-03-26 RX ORDER — ERGOCALCIFEROL 1.25 MG/1
50000 CAPSULE ORAL
Qty: 12 CAP | Refills: 3 | Status: SHIPPED | OUTPATIENT
Start: 2021-03-26 | End: 2021-11-23 | Stop reason: ALTCHOICE

## 2021-03-31 ENCOUNTER — HOSPITAL ENCOUNTER (OUTPATIENT)
Dept: PHYSICAL THERAPY | Age: 56
Discharge: HOME | End: 2021-03-31
Payer: COMMERCIAL

## 2021-03-31 PROCEDURE — 97016 VASOPNEUMATIC DEVICE THERAPY: CPT

## 2021-03-31 PROCEDURE — 97110 THERAPEUTIC EXERCISES: CPT

## 2021-03-31 NOTE — PROGRESS NOTES
PT DAILY TREATMENT NOTE 2-15    Patient Name: Mendoza Dunn  Date:3/31/2021  : 1965  [x]  Patient  Verified  Payor: [address] / Plan: [facility] [address] / Product Type: Commerical /    In time:9:55a  Out time:10:55a  Total Treatment Time (min): 60  Visit #:  12    Treatment Area: Total knee replacement status, right [Z96.651]    SUBJECTIVE  Pain Level (0-10 scale): 0/10 \"ache\"  Any medication changes, allergies to medications, adverse drug reactions, diagnosis change, or new procedure performed?: [x] No    [] Yes (see summary sheet for update)  Subjective functional status/changes:   [] No changes reported  Pt reports she has been feeling good since last visit, just some ache today. OBJECTIVE    Modality rationale: decrease inflammation and decrease pain to improve the patients ability to ambulate out of the facility without an increase in pain.     Min Type Additional Details      15 [x] Estim: []Att   [x]Unatt    []TENS instruct                  [x]IFC  []Premod   []NMES                     []Other:  []w/US   [x]w/ice   []w/heat  Position: supine  Location:knee       []  Traction: [] Cervical       []Lumbar                       [] Prone          []Supine                       []Intermittent   []Continuous Lbs:  [] before manual  [] after manual  []w/heat    []  Ultrasound: []Continuous   [] Pulsed                       at: []1MHz   []3MHz Location:  W/cm2:    [] Paraffin         Location:   []w/heat    []  Ice     []  Heat  []  Ice massage Position: seated  Location:hamstring/gastroc    []  Laser  []  Other: Position:  Location:   15   [x]  Vasopneumatic Device:concurrent with e-stim Pressure:       [] lo [x] med [] hi   Temperature: 34     [x] Skin assessment post-treatment:  [x]intact []redness- no adverse reaction    []redness  adverse reaction:         45 min Therapeutic Exercise:  [x] See flow sheet :   Rationale: increase ROM and increase strength to improve the patients ability to complete ADLs without an increase in pain. With   [x] TE   [] TA   [] Neuro   [] SC   [] other: Patient Education: [x] Review HEP    [x] Progressed/Changed HEP based on:   [x] positioning   [x] body mechanics   [] transfers   [x] heat/ice application    [] other:      Other Objective/Functional Measures:   Patient able to go full revolutions in retro for 1 minute then forward for 1 minute with min-mod hip lift (forward) with gradual decreased compensations by end     Mod - max difficulty with SLS with several LOB      Pain Level (0-10 scale) post treatment: 1     ASSESSMENT/Changes in Function:  Patient able to advance several standing exercises with no pain throughout. Patient will continue to benefit from skilled PT services to modify and progress therapeutic interventions, address functional mobility deficits, address ROM deficits, address strength deficits, analyze and cue movement patterns, analyze and modify body mechanics/ergonomics and instruct in home and community integration to attain remaining goals. [x]  See Plan of Care  []  See progress note/recertification  []  See Discharge Summary         Progress towards goals / Updated goals: Patient making steady progress towards goals and continues to improve ROM.      PLAN  [x]  Upgrade activities as tolerated     [x]  Continue plan of care  [x]  Update interventions per flow sheet       []  Discharge due to:_  []  Other:_      Catherine [employer] Jacob, PTA 3/31/2021

## 2021-04-02 ENCOUNTER — APPOINTMENT (OUTPATIENT)
Dept: PHYSICAL THERAPY | Age: 56
End: 2021-04-02
Payer: COMMERCIAL

## 2021-04-03 ENCOUNTER — IMMUNIZATION (OUTPATIENT)
Dept: INTERNAL MEDICINE CLINIC | Age: 56
End: 2021-04-03
Payer: COMMERCIAL

## 2021-04-03 DIAGNOSIS — Z23 ENCOUNTER FOR IMMUNIZATION: Primary | ICD-10-CM

## 2021-04-03 PROCEDURE — 0001A COVID-19, MRNA, LNP-S, PF, 30MCG/0.3ML DOSE(PFIZER): CPT | Performed by: FAMILY MEDICINE

## 2021-04-03 PROCEDURE — 91300 COVID-19, MRNA, LNP-S, PF, 30MCG/0.3ML DOSE(PFIZER): CPT | Performed by: FAMILY MEDICINE

## 2021-04-05 ENCOUNTER — HOSPITAL ENCOUNTER (OUTPATIENT)
Dept: PHYSICAL THERAPY | Age: 56
Discharge: HOME | End: 2021-04-05
Payer: COMMERCIAL

## 2021-04-05 PROCEDURE — 97016 VASOPNEUMATIC DEVICE THERAPY: CPT

## 2021-04-05 PROCEDURE — 97110 THERAPEUTIC EXERCISES: CPT

## 2021-04-05 NOTE — PROGRESS NOTES
PT DAILY TREATMENT NOTE 2-15    Patient Name: Joaquim Bence  Date:2021  : 1965  [x]  Patient  Verified  Payor: [address] / Plan: [facility] [place] [address] / Product Type: Commerical /    In time:11:30a  Out time: 12:35p  Total Treatment Time (min): 65  Visit #:  13    Treatment Area: Total knee replacement status, right [Z96.611]    SUBJECTIVE  Pain Level (0-10 scale): 0/10  Any medication changes, allergies to medications, adverse drug reactions, diagnosis change, or new procedure performed?: [x] No    [] Yes (see summary sheet for update)  Subjective functional status/changes:   [] No changes reported  Patient reports she is feeling better since last week, but has a HA today. Patient reports she has also not taken her pain medication today to see how she will handle starting back to work yesterday. OBJECTIVE    Modality rationale: decrease inflammation and decrease pain to improve the patients ability to ambulate out of the facility without an increase in pain.     Min Type Additional Details      15 [x] Estim: []Att   [x]Unatt    []TENS instruct                  [x]IFC  []Premod   []NMES                     []Other:  []w/US   [x]w/ice   []w/heat  Position: supine  Location:knee       []  Traction: [] Cervical       []Lumbar                       [] Prone          []Supine                       []Intermittent   []Continuous Lbs:  [] before manual  [] after manual  []w/heat    []  Ultrasound: []Continuous   [] Pulsed                       at: []1MHz   []3MHz Location:  W/cm2:    [] Paraffin         Location:   []w/heat    []  Ice     []  Heat  []  Ice massage Position: seated  Location:hamstring/gastroc    []  Laser  []  Other: Position:  Location:   20   [x]  Vasopneumatic Device:concurrent with e-stim Pressure:       [] lo [x] med [] hi   Temperature: 34     [x] Skin assessment post-treatment:  [x]intact []redness- no adverse reaction    []redness  adverse reaction:         45 min Therapeutic Exercise:  [x] See flow sheet :   Rationale: increase ROM and increase strength to improve the patients ability to complete ADLs without an increase in pain. With   [x] TE   [] TA   [] Neuro   [] SC   [] other: Patient Education: [x] Review HEP    [x] Progressed/Changed HEP based on:   [x] positioning   [x] body mechanics   [] transfers   [x] heat/ice application    [] other:      Other Objective/Functional Measures:   Patient able to go full revolutions  After ~1 minute of flossing    Max fatigue with advanced exercises      Pain Level (0-10 scale) post treatment: 1     ASSESSMENT/Changes in Function:  Patient able to advance several standing exercises with min pain with lateral step ups, will do well with 4 inch step next visit. Patient will continue to benefit from skilled PT services to modify and progress therapeutic interventions, address functional mobility deficits, address ROM deficits, address strength deficits, analyze and cue movement patterns, analyze and modify body mechanics/ergonomics and instruct in home and community integration to attain remaining goals. [x]  See Plan of Care  []  See progress note/recertification  []  See Discharge Summary         Progress towards goals / Updated goals: Patient making steady progress towards goals and continues to improve ROM.      PLAN  [x]  Upgrade activities as tolerated     [x]  Continue plan of care  [x]  Update interventions per flow sheet       []  Discharge due to:_  []  Other:_      Era Felipe, PTA 4/5/2021

## 2021-04-12 ENCOUNTER — APPOINTMENT (OUTPATIENT)
Dept: PHYSICAL THERAPY | Age: 56
End: 2021-04-12
Payer: COMMERCIAL

## 2021-04-14 ENCOUNTER — APPOINTMENT (OUTPATIENT)
Dept: PHYSICAL THERAPY | Age: 56
End: 2021-04-14
Payer: COMMERCIAL

## 2021-04-19 ENCOUNTER — APPOINTMENT (OUTPATIENT)
Dept: PHYSICAL THERAPY | Age: 56
End: 2021-04-19
Payer: COMMERCIAL

## 2021-04-20 RX ORDER — ATORVASTATIN CALCIUM 20 MG/1
TABLET, FILM COATED ORAL
Qty: 90 TAB | Refills: 1 | Status: SHIPPED | OUTPATIENT
Start: 2021-04-20 | End: 2021-11-23 | Stop reason: SDUPTHER

## 2021-04-20 RX ORDER — SERTRALINE HYDROCHLORIDE 100 MG/1
TABLET, FILM COATED ORAL
Qty: 90 TAB | Refills: 1 | Status: SHIPPED | OUTPATIENT
Start: 2021-04-20 | End: 2021-11-23 | Stop reason: SDUPTHER

## 2021-04-21 ENCOUNTER — APPOINTMENT (OUTPATIENT)
Dept: PHYSICAL THERAPY | Age: 56
End: 2021-04-21
Payer: COMMERCIAL

## 2021-04-24 ENCOUNTER — IMMUNIZATION (OUTPATIENT)
Dept: INTERNAL MEDICINE CLINIC | Age: 56
End: 2021-04-24
Payer: COMMERCIAL

## 2021-04-24 DIAGNOSIS — Z23 ENCOUNTER FOR IMMUNIZATION: Primary | ICD-10-CM

## 2021-04-24 PROCEDURE — 0002A COVID-19, MRNA, LNP-S, PF, 30MCG/0.3ML DOSE(PFIZER): CPT | Performed by: FAMILY MEDICINE

## 2021-04-24 PROCEDURE — 91300 COVID-19, MRNA, LNP-S, PF, 30MCG/0.3ML DOSE(PFIZER): CPT | Performed by: FAMILY MEDICINE

## 2021-04-26 ENCOUNTER — APPOINTMENT (OUTPATIENT)
Dept: PHYSICAL THERAPY | Age: 56
End: 2021-04-26
Payer: COMMERCIAL

## 2021-04-28 ENCOUNTER — APPOINTMENT (OUTPATIENT)
Dept: PHYSICAL THERAPY | Age: 56
End: 2021-04-28
Payer: COMMERCIAL

## 2021-05-20 DIAGNOSIS — E11.8 TYPE 2 DIABETES MELLITUS WITH COMPLICATION, WITHOUT LONG-TERM CURRENT USE OF INSULIN (HCC): ICD-10-CM

## 2021-05-20 RX ORDER — DULAGLUTIDE 1.5 MG/.5ML
1.5 INJECTION, SOLUTION SUBCUTANEOUS
Qty: 12 EACH | Refills: 2 | Status: SHIPPED | OUTPATIENT
Start: 2021-05-20 | End: 2021-11-23 | Stop reason: ALTCHOICE

## 2021-07-08 DIAGNOSIS — F41.9 ANXIETY AND DEPRESSION: ICD-10-CM

## 2021-07-08 DIAGNOSIS — F32.A ANXIETY AND DEPRESSION: ICD-10-CM

## 2021-07-08 RX ORDER — BUPROPION HYDROCHLORIDE 300 MG/1
TABLET ORAL
Qty: 90 TABLET | Refills: 0 | Status: SHIPPED | OUTPATIENT
Start: 2021-07-08 | End: 2021-11-23 | Stop reason: SDUPTHER

## 2021-08-02 NOTE — PROGRESS NOTES
Physical Therapy at [facility],   a part of [address]  [place]  [facility], [address]  Phone: [phone]  Fax: [phone]    Discharge Summary  2-15    Patient name: Britton Conway  : 1965  Provider#: [id number]  Referral source: Jocelyn Archibald MD      Medical/Treatment Diagnosis: Total knee replacement status, right [Z96.651]     Prior Hospitalization: see medical history     Comorbidities: See Plan of Care  Prior Level of Function:See Plan of Care  Medications: Verified on Patient Summary List    Start of Care: 21      Onset Date:21   Visits from Start of Care: 13     Missed Visits: 7  Reporting Period : 21 to 21      ASSESSMENT/SUMMARY OF CARE:  Patient has not returned to therapy since 2021 due to work schedule and is to be discharged at this time. Short Term Goals: To be accomplished in 4 weeks:  1. Pt will be able to perform knee flexion AROM to 90 degrees, in order to perform reciprocal stairs to get to her second floor. MET  2. Pt will be able to perform MMT of 4/5 of all RLE, in order to complete ADLs without an increase in pain. MET  3. Pt will be able to be independent with HEP to help with carryover outside of  MET  [address] [facility] [address] be accomplished in 12 weeks:  1. Pt will be able perform knee flexion AROM to 110 degrees, in order to perform reciprocal stairs to get to her second floor.  Progressing  2. Pt will be able perform all MMT of RLE to a 5/5, in order to complete ADLs without an increase in pain.  Progressing towards  3.  Pt will be able return to work and complete an entire shift, without an increase in pain  Progressing        RECOMMENDATIONS:  [x]Discontinue therapy: [x]Patient has reached or is progressing toward set goals      [x]Patient is non-compliant or has abdicated      []Due to lack of appreciable progress towards set goals      []Other    Deann Mckeon, PT 2021

## 2021-11-23 ENCOUNTER — OFFICE VISIT (OUTPATIENT)
Dept: INTERNAL MEDICINE CLINIC | Age: 56
End: 2021-11-23
Payer: COMMERCIAL

## 2021-11-23 VITALS
HEIGHT: 65 IN | OXYGEN SATURATION: 95 % | SYSTOLIC BLOOD PRESSURE: 117 MMHG | DIASTOLIC BLOOD PRESSURE: 81 MMHG | RESPIRATION RATE: 18 BRPM | HEART RATE: 74 BPM | TEMPERATURE: 98.1 F | BODY MASS INDEX: 43.18 KG/M2 | WEIGHT: 259.2 LBS

## 2021-11-23 DIAGNOSIS — E78.5 HYPERLIPIDEMIA, UNSPECIFIED HYPERLIPIDEMIA TYPE: ICD-10-CM

## 2021-11-23 DIAGNOSIS — H92.03 OTALGIA OF BOTH EARS: ICD-10-CM

## 2021-11-23 DIAGNOSIS — G47.33 OSA (OBSTRUCTIVE SLEEP APNEA): ICD-10-CM

## 2021-11-23 DIAGNOSIS — E55.9 VITAMIN D DEFICIENCY: ICD-10-CM

## 2021-11-23 DIAGNOSIS — Z23 ENCOUNTER FOR IMMUNIZATION: ICD-10-CM

## 2021-11-23 DIAGNOSIS — F32.A ANXIETY AND DEPRESSION: Primary | ICD-10-CM

## 2021-11-23 DIAGNOSIS — F41.9 ANXIETY AND DEPRESSION: Primary | ICD-10-CM

## 2021-11-23 DIAGNOSIS — E11.8 TYPE 2 DIABETES MELLITUS WITH COMPLICATION, WITHOUT LONG-TERM CURRENT USE OF INSULIN (HCC): ICD-10-CM

## 2021-11-23 DIAGNOSIS — Z23 NEEDS FLU SHOT: ICD-10-CM

## 2021-11-23 LAB — UR CULT HOLD, URHOLD: NORMAL

## 2021-11-23 PROCEDURE — 90686 IIV4 VACC NO PRSV 0.5 ML IM: CPT | Performed by: INTERNAL MEDICINE

## 2021-11-23 PROCEDURE — 90471 IMMUNIZATION ADMIN: CPT | Performed by: INTERNAL MEDICINE

## 2021-11-23 PROCEDURE — 99214 OFFICE O/P EST MOD 30 MIN: CPT | Performed by: INTERNAL MEDICINE

## 2021-11-23 RX ORDER — BUPROPION HYDROCHLORIDE 300 MG/1
TABLET ORAL
Qty: 90 TABLET | Refills: 0 | Status: SHIPPED | OUTPATIENT
Start: 2021-11-23 | End: 2022-03-24 | Stop reason: SDUPTHER

## 2021-11-23 RX ORDER — SERTRALINE HYDROCHLORIDE 100 MG/1
TABLET, FILM COATED ORAL
Qty: 90 TABLET | Refills: 1 | Status: SHIPPED | OUTPATIENT
Start: 2021-11-23 | End: 2022-03-08 | Stop reason: SDUPTHER

## 2021-11-23 RX ORDER — DEXAMETHASONE SODIUM PHOSPHATE 1 MG/ML
SOLUTION/ DROPS OPHTHALMIC
Qty: 5 ML | Refills: 2 | Status: SHIPPED | OUTPATIENT
Start: 2021-11-23 | End: 2022-09-01 | Stop reason: SDUPTHER

## 2021-11-23 RX ORDER — ATORVASTATIN CALCIUM 20 MG/1
TABLET, FILM COATED ORAL
Qty: 90 TABLET | Refills: 1 | Status: SHIPPED | OUTPATIENT
Start: 2021-11-23 | End: 2022-03-24 | Stop reason: SDUPTHER

## 2021-11-23 NOTE — PROGRESS NOTES
Noe Melvin is a 61 y.o. female who presents today for Follow-up (RM18// pt presents today for medication chk, is having some itching in ears and right ear pops and cracks, wants to discuss having a sleep study done due to snoring; would like flu shot toda)  . She has a history of   Patient Active Problem List   Diagnosis Code    New onset of headaches after age 52 R50.7    Visual changes H53.9    Obesity, morbid ([address] E66.01    Hyperlipidemia, unspecified hyperlipidemia type E78.5    Anxiety and depression F41.9, F32. A    IFG (impaired fasting glucose) R73.01    Type 2 diabetes mellitus with complication, without long-term current use of insulin (HCC) E11.8    Primary osteoarthritis of right knee M17.11   . Today patient is here for f/u. 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 Enjoying this work but is a lot less active    Concern for BRANDON:   Do you snore loudly? YES  Are you often sleepy during the day or fall asleep during the day? YES  Has anyone observed you choking or stop breathing? YES    HTN? NO  BMI >35? YES   Age >50 YES  Large neck size? (men>17inches women >15) YES  Male? NO    Total: 6    Risk category Risk factors   Low risk Yes to 0 to 2 of the questions   Intermediate risk Yes to 3 to 4 questions   High risk Yes to 5 to 8 questions   High risk Yes to 2 or more of 4 STOP questions and BMI >35 kg/m2   High risk Yes to 2 or more of 4 STOP questions and neck circumference ? 17 inches [?43 cm] in male or ? 16 inches [?41 cm] in female   High risk Yes to 2 or more of 4 STOP questions and male gender     On Lipitor, but only 1-2 times per week. Anxiety/Depression: On Wellbutrin/zoloft. Noted that Mental health  has been pretty good. Obesity/DM: weight has trended up. May be due to lack of physical activity with your child. He is off the Trulicity. Has finished weekly Vitamin D    ROS  Review of Systems   Constitutional: Negative for chills, fever and weight loss. HENT: Positive for ear pain. Negative for congestion, hearing loss, sore throat and tinnitus. Itching of ears   Eyes: Negative for blurred vision, double vision and photophobia. Respiratory: Negative for cough and shortness of breath. Cardiovascular: Negative for chest pain, palpitations and leg swelling. Gastrointestinal: Negative for abdominal pain, constipation, diarrhea, heartburn, nausea and vomiting. Genitourinary: Negative for dysuria, frequency and urgency. Musculoskeletal: Negative for joint pain and myalgias. Skin: Negative for rash. Neurological: Negative. Negative for headaches. Endo/Heme/Allergies: Does not bruise/bleed easily. Psychiatric/Behavioral: Negative for depression, memory loss and suicidal ideas. The patient is not nervous/anxious. Visit Vitals  /81 (BP 1 Location: Left upper arm, BP Patient Position: Sitting, BP Cuff Size: Large adult)   Pulse 74   Temp 98.1 °F (36.7 °C) (Oral)   Resp 18   Ht 5' 5\" (1.651 m)   Wt 259 lb 3.2 oz (117.6 kg)   SpO2 95%   BMI 43.13 kg/m²       Physical Exam  Constitutional:       General: She is not in acute distress. Appearance: She is well-developed. HENT:      Head: Normocephalic and atraumatic. Ears:      Comments: Dry scaly ear canals consistent with eczema. Neck:      Thyroid: No thyromegaly. Cardiovascular:      Rate and Rhythm: Normal rate and regular rhythm. Heart sounds: No murmur heard. Pulmonary:      Effort: Pulmonary effort is normal.      Breath sounds: Normal breath sounds. No wheezing. Abdominal:      General: Bowel sounds are normal. There is no distension. Palpations: Abdomen is soft. Musculoskeletal:      Cervical back: Normal range of motion and neck supple. Skin:     General: Skin is warm and dry. Neurological:      Mental Status: She is alert and oriented to person, place, and time. Cranial Nerves: No cranial nerve deficit.    Psychiatric:         Behavior: Behavior normal.           Current Outpatient Medications   Medication Sig    buPROPion XL (WELLBUTRIN XL) 300 mg XL tablet TAKE ONE TABLET BY MOUTH EVERY MORNING    sertraline (ZOLOFT) 100 mg tablet TAKE 1 TABLET BY MOUTH ONE TIME A DAY    atorvastatin (LIPITOR) 20 mg tablet TAKE 1 TABLET BY MOUTH ONE TIME A DAY    dulaglutide (Trulicity) 1.5 OG/3.9 mL sub-q pen 0.5 mL by SubCUTAneous route every seven (7) days. (Patient not taking: Reported on 2021)    ergocalciferol (ERGOCALCIFEROL) 1,250 mcg (50,000 unit) capsule Take 1 Cap by mouth every seven (7) days. (Patient not taking: Reported on 2021)    diclofenac EC (VOLTAREN) 75 mg EC tablet Take 75 mg by mouth two (2) times a day. (Patient not taking: Reported on 2021)    diclofenac (VOLTAREN) 1 % gel Apply  to affected area four (4) times daily. (Patient not taking: Reported on 2021)    elderberry fruit (ELDERBERRY PO) Take 1 Tab by mouth daily. (Patient not taking: Reported on 2021)    MAGNESIUM OXIDE PO Take 1 Tab by mouth daily. (Patient not taking: Reported on 2021)     No current facility-administered medications for this visit.         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    HX WISDOM TEETH EXTRACTION        Social History     Tobacco Use    Smoking status: Never Smoker    Smokeless tobacco: Never Used   Substance Use Topics    Alcohol use: Yes     Comment: rare      Family History   Problem Relation Age of Onset    Cancer Father         bladder and skin cancer    Elevated Lipids Father     Stroke Father         x 2    Dementia Mother     Anesth Problems Neg Hx     Clotting Disorder Neg Hx         Allergies   Allergen Reactions    Levaquin [Levofloxacin] Rash    Pcn [Penicillins] Rash    Percocet [Oxycodone-Acetaminophen] Nausea and Vomiting     \"It upsets my stomach. \"        Assessment/Plan  Diagnoses and all orders for this visit:    1. Encounter for immunization  -     INFLUENZA VACCINE QUADRIVALENT VIAL 6-35 MO IM    2. Anxiety and depression-mental health is doing well with recent move and new job. -     MICROALBUMIN, UR, RAND W/ MICROALB/CREAT RATIO; Future  -     buPROPion XL (WELLBUTRIN XL) 300 mg XL tablet; TAKE ONE TABLET BY MOUTH EVERY MORNING  -     sertraline (ZOLOFT) 100 mg tablet; TAKE 1 TABLET BY MOUTH ONE TIME A DAY    3. Hyperlipidemia, unspecified hyperlipidemia type-admits to noncompliance with medication.  -     LIPID PANEL; Future  -     METABOLIC PANEL, COMPREHENSIVE; Future  -     MICROALBUMIN, UR, RAND W/ MICROALB/CREAT RATIO; Future  -     atorvastatin (LIPITOR) 20 mg tablet; TAKE 1 TABLET BY MOUTH ONE TIME A DAY    4. Type 2 diabetes mellitus with complication, without long-term current use of insulin (HCC)-off Trulicity, weight has trended up.  -     HEMOGLOBIN A1C WITH EAG; Future  -     MICROALBUMIN, UR, RAND W/ MICROALB/CREAT RATIO; Future    5. Vitamin D deficiency  -     VITAMIN D, 25 HYDROXY; Future    6. BRANDON (obstructive sleep apnea)- - STOPBANG of 6, high likelihood of obstructive sleep apnea. -     REFERRAL TO SLEEP STUDIES    7. Otalgia of both ears- topical steroids. -     dexamethasone (DECADRON) 0.1 % ophthalmic solution; Apply 1-2 drops in each ear as needed for itching. Apolonia Jc MD  11/23/2021    This note was created with the help of speech recognition software [employer]) and may contain some 'sound alike' errors.

## 2021-11-23 NOTE — PROGRESS NOTES
Kelly Ruvalcaba  Identified pt with two pt identifiers(name and ). Chief Complaint   Patient presents with    Follow-up     RM18// pt presents today for medication chk, is having some itching in ears and right ear pops and cracks, wants to discuss having a sleep study done due to snoring; would like flu shot toda       1. Have you been to the ER, urgent care clinic since your last visit? Hospitalized since your last visit? NO    2. Have you seen or consulted any other health care providers outside of the [address] [place] [address] since your last visit? Include any pap smears or colon screening. NO      Provider notified of reason for visit, vitals and flowsheets obtained on patients.      Patient received paperwork for advance directive during previous visit but has not completed at this time     Reviewed record In preparation for visit, huddled with provider and have obtained necessary documentation      Health Maintenance Due   Topic    Eye Exam Retinal or Dilated     Cervical cancer screen     Shingrix Vaccine Age 50> (1 of 2)    Breast Cancer Screen Mammogram     MICROALBUMIN Q1     Flu Vaccine (1)       Wt Readings from Last 3 Encounters:   21 259 lb 3.2 oz (117.6 kg)   21 238 lb (108 kg)   21 236 lb 8.9 oz (107.3 kg)     Temp Readings from Last 3 Encounters:   21 98.1 °F (36.7 °C) (Oral)   21 98.5 °F (36.9 °C) (Oral)   02/10/21 99.3 °F (37.4 °C)     BP Readings from Last 3 Encounters:   21 117/81   21 105/69   02/10/21 97/63     Pulse Readings from Last 3 Encounters:   21 74   21 93   02/10/21 77     Vitals:    21 0750   BP: 117/81   Pulse: 74   Resp: 18   Temp: 98.1 °F (36.7 °C)   TempSrc: Oral   SpO2: 95%   Weight: 259 lb 3.2 oz (117.6 kg)   Height: 5' 5\" (1.651 m)   PainSc:   0 - No pain   LMP: 2016         Learning Assessment:  :     Learning Assessment 6/3/2016 2014 2014   PRIMARY LEARNER Patient Patient Patient   HIGHEST LEVEL OF EDUCATION - PRIMARY LEARNER  - 4 YEARS OF COLLEGE -   BARRIERS PRIMARY LEARNER - NONE -   CO-LEARNER CAREGIVER - No -   PRIMARY LANGUAGE ENGLISH ENGLISH ENGLISH   LEARNER PREFERENCE PRIMARY READING LISTENING LISTENING   ANSWERED BY patient Patient patient   RELATIONSHIP SELF SELF SELF       Depression Screening:  :     3 most recent PHQ Screens 8/18/2020   Little interest or pleasure in doing things Several days   Feeling down, depressed, irritable, or hopeless Several days   Total Score PHQ 2 2       Fall Risk Assessment:  :     Fall Risk Assessment, last 12 mths 8/18/2020   Able to walk? Yes   Fall in past 12 months? No       Abuse Screening:  :     Abuse Screening Questionnaire 8/18/2020 1/16/2020 12/17/2019 8/27/2019 7/2/2019 7/19/2018   Do you ever feel afraid of your partner? N N N N N N   Are you in a relationship with someone who physically or mentally threatens you? N N N N N N   Is it safe for you to go home? Y Y Y Y Y Y       ADL Screening:  :     No flowsheet data found. Medication reconciliation up to date and corrected with patient at this time.

## 2021-11-24 LAB
25(OH)D3 SERPL-MCNC: 15.4 NG/ML (ref 30–100)
ALBUMIN SERPL-MCNC: 3.8 G/DL (ref 3.5–5)
ALBUMIN/GLOB SERPL: 1.1 {RATIO} (ref 1.1–2.2)
ALP SERPL-CCNC: 71 U/L (ref 45–117)
ALT SERPL-CCNC: 20 U/L (ref 12–78)
ANION GAP SERPL CALC-SCNC: 6 MMOL/L (ref 5–15)
AST SERPL-CCNC: 11 U/L (ref 15–37)
BILIRUB SERPL-MCNC: 0.4 MG/DL (ref 0.2–1)
BUN SERPL-MCNC: 21 MG/DL (ref 6–20)
BUN/CREAT SERPL: 31 (ref 12–20)
CALCIUM SERPL-MCNC: 9.6 MG/DL (ref 8.5–10.1)
CHLORIDE SERPL-SCNC: 106 MMOL/L (ref 97–108)
CHOLEST SERPL-MCNC: 258 MG/DL
CO2 SERPL-SCNC: 27 MMOL/L (ref 21–32)
CREAT SERPL-MCNC: 0.67 MG/DL (ref 0.55–1.02)
CREAT UR-MCNC: 38.3 MG/DL
EST. AVERAGE GLUCOSE BLD GHB EST-MCNC: 123 MG/DL
GLOBULIN SER CALC-MCNC: 3.4 G/DL (ref 2–4)
GLUCOSE SERPL-MCNC: 103 MG/DL (ref 65–100)
HBA1C MFR BLD: 5.9 % (ref 4–5.6)
HDLC SERPL-MCNC: 55 MG/DL
HDLC SERPL: 4.7 {RATIO} (ref 0–5)
LDLC SERPL CALC-MCNC: 175.4 MG/DL (ref 0–100)
MICROALBUMIN UR-MCNC: <0.5 MG/DL
MICROALBUMIN/CREAT UR-RTO: <13 MG/G (ref 0–30)
POTASSIUM SERPL-SCNC: 4.7 MMOL/L (ref 3.5–5.1)
PROT SERPL-MCNC: 7.2 G/DL (ref 6.4–8.2)
SODIUM SERPL-SCNC: 139 MMOL/L (ref 136–145)
TRIGL SERPL-MCNC: 138 MG/DL (ref ?–150)
VLDLC SERPL CALC-MCNC: 27.6 MG/DL

## 2021-11-24 RX ORDER — ERGOCALCIFEROL 1.25 MG/1
50000 CAPSULE ORAL
Qty: 12 CAPSULE | Refills: 3 | Status: SHIPPED | OUTPATIENT
Start: 2021-11-24 | End: 2022-05-31 | Stop reason: SDUPTHER

## 2022-03-08 DIAGNOSIS — F32.A ANXIETY AND DEPRESSION: ICD-10-CM

## 2022-03-08 DIAGNOSIS — F41.9 ANXIETY AND DEPRESSION: ICD-10-CM

## 2022-03-08 RX ORDER — SERTRALINE HYDROCHLORIDE 100 MG/1
TABLET, FILM COATED ORAL
Qty: 90 TABLET | Refills: 0 | Status: SHIPPED | OUTPATIENT
Start: 2022-03-08 | End: 2022-05-31 | Stop reason: SDUPTHER

## 2022-03-18 PROBLEM — E66.01 OBESITY, MORBID (HCC): Status: ACTIVE | Noted: 2018-07-19

## 2022-03-19 PROBLEM — F41.9 ANXIETY AND DEPRESSION: Status: ACTIVE | Noted: 2019-07-02

## 2022-03-19 PROBLEM — R73.01 IFG (IMPAIRED FASTING GLUCOSE): Status: ACTIVE | Noted: 2019-07-02

## 2022-03-19 PROBLEM — M17.11 PRIMARY OSTEOARTHRITIS OF RIGHT KNEE: Status: ACTIVE | Noted: 2021-02-09

## 2022-03-19 PROBLEM — E78.5 HYPERLIPIDEMIA, UNSPECIFIED HYPERLIPIDEMIA TYPE: Status: ACTIVE | Noted: 2019-07-01

## 2022-03-19 PROBLEM — F32.A ANXIETY AND DEPRESSION: Status: ACTIVE | Noted: 2019-07-02

## 2022-03-20 PROBLEM — E11.8 TYPE 2 DIABETES MELLITUS WITH COMPLICATION, WITHOUT LONG-TERM CURRENT USE OF INSULIN (HCC): Status: ACTIVE | Noted: 2019-07-09

## 2022-03-24 DIAGNOSIS — E78.5 HYPERLIPIDEMIA, UNSPECIFIED HYPERLIPIDEMIA TYPE: ICD-10-CM

## 2022-03-24 DIAGNOSIS — F32.A ANXIETY AND DEPRESSION: ICD-10-CM

## 2022-03-24 DIAGNOSIS — F41.9 ANXIETY AND DEPRESSION: ICD-10-CM

## 2022-03-24 RX ORDER — ATORVASTATIN CALCIUM 20 MG/1
TABLET, FILM COATED ORAL
Qty: 90 TABLET | Refills: 1 | Status: SHIPPED | OUTPATIENT
Start: 2022-03-24 | End: 2022-05-31 | Stop reason: SDUPTHER

## 2022-03-24 RX ORDER — BUPROPION HYDROCHLORIDE 300 MG/1
TABLET ORAL
Qty: 90 TABLET | Refills: 0 | Status: SHIPPED | OUTPATIENT
Start: 2022-03-24 | End: 2022-05-11 | Stop reason: SDUPTHER

## 2022-05-10 ENCOUNTER — PATIENT MESSAGE (OUTPATIENT)
Dept: INTERNAL MEDICINE CLINIC | Age: 57
End: 2022-05-10

## 2022-05-10 DIAGNOSIS — F41.9 ANXIETY AND DEPRESSION: ICD-10-CM

## 2022-05-10 DIAGNOSIS — F32.A ANXIETY AND DEPRESSION: ICD-10-CM

## 2022-05-11 ENCOUNTER — PATIENT MESSAGE (OUTPATIENT)
Dept: INTERNAL MEDICINE CLINIC | Age: 57
End: 2022-05-11

## 2022-05-11 DIAGNOSIS — F41.9 ANXIETY AND DEPRESSION: ICD-10-CM

## 2022-05-11 DIAGNOSIS — F32.A ANXIETY AND DEPRESSION: ICD-10-CM

## 2022-05-11 RX ORDER — BUPROPION HYDROCHLORIDE 300 MG/1
TABLET ORAL
Qty: 90 TABLET | Refills: 0 | Status: SHIPPED | OUTPATIENT
Start: 2022-05-11 | End: 2022-05-31 | Stop reason: SDUPTHER

## 2022-05-11 RX ORDER — BUPROPION HYDROCHLORIDE 300 MG/1
TABLET ORAL
Qty: 30 TABLET | Refills: 0 | Status: SHIPPED | OUTPATIENT
Start: 2022-05-11 | End: 2022-05-11 | Stop reason: SDUPTHER

## 2022-05-29 ENCOUNTER — HOSPITAL ENCOUNTER (EMERGENCY)
Age: 57
Discharge: HOME | End: 2022-05-29
Payer: COMMERCIAL

## 2022-05-29 VITALS
OXYGEN SATURATION: 96 % | HEIGHT: 65 IN | BODY MASS INDEX: 39.99 KG/M2 | SYSTOLIC BLOOD PRESSURE: 127 MMHG | RESPIRATION RATE: 18 BRPM | TEMPERATURE: 97.5 F | WEIGHT: 240 LBS | DIASTOLIC BLOOD PRESSURE: 70 MMHG | HEART RATE: 91 BPM

## 2022-05-29 DIAGNOSIS — S81.811A LACERATION OF RIGHT LOWER EXTREMITY, INITIAL ENCOUNTER: Primary | ICD-10-CM

## 2022-05-29 PROCEDURE — 74011000250 HC RX REV CODE- 250: Performed by: PHYSICIAN ASSISTANT

## 2022-05-29 PROCEDURE — 75810000293 HC SIMP/SUPERF WND  RPR

## 2022-05-29 PROCEDURE — 99283 EMERGENCY DEPT VISIT LOW MDM: CPT

## 2022-05-29 RX ORDER — CEPHALEXIN 500 MG/1
500 CAPSULE ORAL 3 TIMES DAILY
Qty: 21 CAPSULE | Refills: 0 | Status: SHIPPED | OUTPATIENT
Start: 2022-05-29 | End: 2022-06-05

## 2022-05-29 RX ORDER — LIDOCAINE HYDROCHLORIDE 10 MG/ML
10 INJECTION INFILTRATION; PERINEURAL ONCE
Status: COMPLETED | OUTPATIENT
Start: 2022-05-29 | End: 2022-05-29

## 2022-05-29 RX ADMIN — LIDOCAINE HYDROCHLORIDE 10 ML: 10 INJECTION, SOLUTION INFILTRATION; PERINEURAL at 17:16

## 2022-05-29 NOTE — ED PROVIDER NOTES
EMERGENCY DEPARTMENT HISTORY AND PHYSICAL EXAM      Date: 2022  Patient Name: Sylvain Cristobal    History of Presenting Illness     Chief Complaint   Patient presents with    Laceration       History Provided By: Patient    HPI: Sylvain Cristobal, 61 y.o. female with a past medical history significant depression who presents to the ED with cc of laceration to right lower leg. Pt states that she was cutting the grass today when a branch stuck her in the leg. There are no other complaints, changes, or physical findings at this time. PCP: Mile Field MD    No current facility-administered medications on file prior to encounter. Current Outpatient Medications on File Prior to Encounter   Medication Sig Dispense Refill    buPROPion XL (WELLBUTRIN XL) 300 mg XL tablet TAKE ONE TABLET BY MOUTH EVERY MORNING 90 Tablet 0    atorvastatin (LIPITOR) 20 mg tablet TAKE 1 TABLET BY MOUTH ONE TIME A DAY 90 Tablet 1    sertraline (ZOLOFT) 100 mg tablet TAKE 1 TABLET BY MOUTH ONE TIME A DAY 90 Tablet 0    ergocalciferol (ERGOCALCIFEROL) 1,250 mcg (50,000 unit) capsule Take 1 Capsule by mouth every seven (7) days. 12 Capsule 3    dexamethasone (DECADRON) 0.1 % ophthalmic solution Apply 1-2 drops in each ear as needed for itching.  5 mL 2       Past History     Past Medical History:  Past Medical History:   Diagnosis Date    Anxiety and depression     Arthritis     Headache     High cholesterol        Past Surgical History:  Past Surgical History:   Procedure Laterality Date    HX  SECTION      HX DILATION AND CURETTAGE      HX GYN      curacloge x2    HX HERNIA REPAIR      HX ORTHOPAEDIC  2013    tumor excision right 3rd finger    HX ORTHOPAEDIC  10/14    right knee arthroscopic meniscus trimming dr Luis Fernando Quintero    HX PELVIC LAPAROSCOPY      HX TUMOR REMOVAL      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    HX WISDOM TEETH EXTRACTION         Family History:  Family History   Problem Relation Age of Onset    Cancer Father         bladder and skin cancer    Elevated Lipids Father     Stroke Father         x 2    Dementia Mother     Anesth Problems Neg Hx     Clotting Disorder Neg Hx        Social History:  Social History     Tobacco Use    Smoking status: Never Smoker    Smokeless tobacco: Never Used   Substance Use Topics    Alcohol use: Yes     Comment: rare    Drug use: No       Allergies: Allergies   Allergen Reactions    Levaquin [Levofloxacin] Rash    Pcn [Penicillins] Rash    Percocet [Oxycodone-Acetaminophen] Nausea and Vomiting     \"It upsets my stomach. \"         Review of Systems     Review of Systems   Constitutional: Negative. HENT: Negative. Eyes: Negative. Respiratory: Negative. Cardiovascular: Negative. Gastrointestinal: Negative. Endocrine: Negative. Genitourinary: Negative. Musculoskeletal: Negative. Skin: Negative. Allergic/Immunologic: Negative. Neurological: Negative. Hematological: Negative. Psychiatric/Behavioral: Negative. Physical Exam     Physical Exam  Vitals and nursing note reviewed. Constitutional:       Appearance: Normal appearance. She is obese. Pulmonary:      Effort: Pulmonary effort is normal.   Musculoskeletal:         General: Normal range of motion. Comments: + 20cm laceration noted on right lower leg + bleeding   Skin:     General: Skin is warm and dry. Neurological:      General: No focal deficit present. Mental Status: She is alert and oriented to person, place, and time. Mental status is at baseline. Psychiatric:         Mood and Affect: Mood normal.         Behavior: Behavior normal.         Thought Content: Thought content normal.         Judgment: Judgment normal.         Lab and Diagnostic Study Results     Labs -   No results found for this or any previous visit (from the past 12 hour(s)).     Radiologic Studies -   @lastxrresult@  CT Results  (Last 48 hours)    None        CXR Results  (Last 48 hours)    None            Medical Decision Making   - I am the first provider for this patient. - I reviewed the vital signs, available nursing notes, past medical history, past surgical history, family history and social history. - Initial assessment performed. The patients presenting problems have been discussed, and they are in agreement with the care plan formulated and outlined with them. I have encouraged them to ask questions as they arise throughout their visit. Vital Signs-Reviewed the patient's vital signs. Patient Vitals for the past 12 hrs:   Temp Pulse Resp BP SpO2   05/29/22 1657 100.1 °F (37.8 °C) 91 18 127/70 96 %       Records Reviewed: Old Medical Records    The patient presents with a differential diagnosis of right leg laceration      ED Course:          Provider Notes (Medical Decision Making):     MDM  Number of Diagnoses or Management Options     Amount and/or Complexity of Data Reviewed  Review and summarize past medical records: yes    Risk of Complications, Morbidity, and/or Mortality  Presenting problems: moderate  Diagnostic procedures: moderate  Management options: moderate    Patient Progress  Patient progress: improved         Procedures   Medical Decision Makingedical Decision Making  Performed by: YARIEL Khan  PROCEDURES:  Wound Repair    Date/Time: 5/29/2022 5:57 PM  Performed by: PAPreparation: skin prepped with Betadine  Pre-procedure re-eval: Immediately prior to the procedure, the patient was reevaluated and found suitable for the planned procedure and any planned medications. Time out: Immediately prior to the procedure a time out was called to verify the correct patient, procedure, equipment, staff and marking as appropriate. .  Location details: right leg  Wound length:20.1 - 30 cm  Anesthesia: local infiltration    Anesthesia:  Local Anesthetic: lidocaine 1% without epinephrine  Anesthetic total: 10 mL  Foreign bodies: no foreign bodies  Irrigation solution: saline  Debridement: none  Wound skin closure material used: 3-0 nylon. Number of sutures: 32  Technique: simple  Approximation: close  Dressing: 4x4 and antibiotic ointment  My total time at bedside, performing this procedure was 16-30 minutes. Disposition   Disposition: Condition improved    Discharged    DISCHARGE PLAN:  1. Current Discharge Medication List      CONTINUE these medications which have NOT CHANGED    Details   buPROPion XL (WELLBUTRIN XL) 300 mg XL tablet TAKE ONE TABLET BY MOUTH EVERY MORNING  Qty: 90 Tablet, Refills: 0    Comments: Please make appt before next refill is due  Associated Diagnoses: Anxiety and depression      atorvastatin (LIPITOR) 20 mg tablet TAKE 1 TABLET BY MOUTH ONE TIME A DAY  Qty: 90 Tablet, Refills: 1    Associated Diagnoses: Hyperlipidemia, unspecified hyperlipidemia type      sertraline (ZOLOFT) 100 mg tablet TAKE 1 TABLET BY MOUTH ONE TIME A DAY  Qty: 90 Tablet, Refills: 0    Associated Diagnoses: Anxiety and depression      ergocalciferol (ERGOCALCIFEROL) 1,250 mcg (50,000 unit) capsule Take 1 Capsule by mouth every seven (7) days. Qty: 12 Capsule, Refills: 3      dexamethasone (DECADRON) 0.1 % ophthalmic solution Apply 1-2 drops in each ear as needed for itching. Qty: 5 mL, Refills: 2    Associated Diagnoses: Otalgia of both ears           2. Follow-up Information     Follow up With Specialties Details Why Contact Ayan Pereira MD Internal Medicine Physician In 1 week Removal of sutures in 10 days [address]  [place] [address]  [phone]          3. Return to ED if worse   4. Current Discharge Medication List      START taking these medications    Details   cephALEXin (Keflex) 500 mg capsule Take 1 Capsule by mouth three (3) times daily for 7 days. Qty: 21 Capsule, Refills: 0  Start date: 5/29/2022, End date: 6/5/2022               Diagnosis     Clinical Impression:   1.  Laceration of right lower extremity, initial encounter        Attestations:    YARIEL Horvath    Please note that this dictation was completed with [employer], the computer voice recognition software. Quite often unanticipated grammatical, syntax, homophones, and other interpretive errors are inadvertently transcribed by the computer software. Please disregard these errors. Please excuse any errors that have escaped final proofreading. Thank you.

## 2022-05-29 NOTE — ED TRIAGE NOTES
\"was cutting grass and right leg was cut on tree limb, [facility] and was directed to come here\"

## 2022-05-31 ENCOUNTER — VIRTUAL VISIT (OUTPATIENT)
Dept: INTERNAL MEDICINE CLINIC | Age: 57
End: 2022-05-31
Payer: COMMERCIAL

## 2022-05-31 DIAGNOSIS — F32.A ANXIETY AND DEPRESSION: ICD-10-CM

## 2022-05-31 DIAGNOSIS — E78.5 HYPERLIPIDEMIA, UNSPECIFIED HYPERLIPIDEMIA TYPE: ICD-10-CM

## 2022-05-31 DIAGNOSIS — F41.9 ANXIETY AND DEPRESSION: ICD-10-CM

## 2022-05-31 DIAGNOSIS — E55.9 VITAMIN D DEFICIENCY: ICD-10-CM

## 2022-05-31 DIAGNOSIS — E11.8 TYPE 2 DIABETES MELLITUS WITH COMPLICATION, WITHOUT LONG-TERM CURRENT USE OF INSULIN (HCC): Primary | ICD-10-CM

## 2022-05-31 PROCEDURE — 99214 OFFICE O/P EST MOD 30 MIN: CPT | Performed by: INTERNAL MEDICINE

## 2022-05-31 RX ORDER — BUPROPION HYDROCHLORIDE 300 MG/1
TABLET ORAL
Qty: 90 TABLET | Refills: 1 | Status: SHIPPED | OUTPATIENT
Start: 2022-05-31

## 2022-05-31 RX ORDER — ERGOCALCIFEROL 1.25 MG/1
50000 CAPSULE ORAL
Qty: 12 CAPSULE | Refills: 3 | Status: SHIPPED | OUTPATIENT
Start: 2022-05-31

## 2022-05-31 RX ORDER — SERTRALINE HYDROCHLORIDE 100 MG/1
TABLET, FILM COATED ORAL
Qty: 90 TABLET | Refills: 1 | Status: SHIPPED | OUTPATIENT
Start: 2022-05-31

## 2022-05-31 RX ORDER — ATORVASTATIN CALCIUM 20 MG/1
TABLET, FILM COATED ORAL
Qty: 90 TABLET | Refills: 1 | Status: SHIPPED | OUTPATIENT
Start: 2022-05-31

## 2022-05-31 NOTE — PROGRESS NOTES
Carlee Ryder was seen on 5/31/2022 using synchronous (real-time) audio-video technology; doxy. me. Consent: Carlee Ryder, who was seen by synchronous (real-time) audio-video technology, and/or her healthcare decision maker, is aware that this patient-initiated, Telehealth encounter on 5/31/2022 is a billable service, with coverage as determined by her insurance carrier. She is aware that she may receive a bill and has provided verbal consent to proceed: Yes. I was in the office while conducting this encounter. Carlee Ryder is a 61 y.o. female who presents today for Medication Refill (VV// pt presents today for medication refill)  . She has a history of   Patient Active Problem List   Diagnosis Code    New onset of headaches after age 53 R50.7    Visual changes H53.9    Obesity, morbid ([facility] [address] E66.01    Hyperlipidemia, unspecified hyperlipidemia type E78.5    Anxiety and depression F41.9, F32. A    IFG (impaired fasting glucose) R73.01    Type 2 diabetes mellitus with complication, without long-term current use of insulin (HCC) E11.8    Primary osteoarthritis of right knee M17.11   . Today patient is being seen for an acute visit. she does not have other concerns. Was seen in the ER 2 days ago due to a leg laceration. Reports that this happened when mowing the grass. Had a large laceration. She is scheduled to see my partner next week to have stitches removed. Anxiety/depression: Patient remains on Wellbutrin and Zoloft. Reports that her mental health is overall low and anxiety is high. This is mainly due to legal issues with her adult son. He was incarcerated for 5 months and has been accused of sexual assault. Unfortunately he has had to move back in with her and is dealing with his own mental health issues.   Though she believes that her anxiety will improve as the legal issues settle down, her weight has trended up as she has been eating more due to stress. Cholesterol was stable at last visit in the fall. We will repeat at follow-up. Blood sugars remain in the prediabetic range, but does have a history of diabetes. Patient's weight has been trending up and she has some concern over her sugars. We discussed options including resuming Trulicity which she has tolerated in the past.    ROS  Review of Systems   Constitutional: Negative for chills, fever and weight loss. Weight gain   HENT: Negative for congestion and sore throat. Eyes: Negative for blurred vision, double vision and photophobia. Respiratory: Negative for cough and shortness of breath. Cardiovascular: Negative for chest pain, palpitations and leg swelling. Gastrointestinal: Negative for abdominal pain, constipation, diarrhea, heartburn, nausea and vomiting. Genitourinary: Negative for dysuria, frequency and urgency. Musculoskeletal: Negative for joint pain and myalgias. Laceration reportedly pink but not using. Denies any pus. Some mild swelling surrounding the laceration. Skin: Negative for rash. Neurological: Negative. Negative for headaches. Endo/Heme/Allergies: Does not bruise/bleed easily. Psychiatric/Behavioral: Positive for depression. Negative for memory loss and suicidal ideas. The patient is nervous/anxious. There were no vitals taken for this visit. No flowsheet data found. Physical Exam  Constitutional:       Appearance: Normal appearance. HENT:      Head: Normocephalic and atraumatic. Pulmonary:      Effort: Pulmonary effort is normal.   Neurological:      General: No focal deficit present. Mental Status: She is alert. Psychiatric:         Mood and Affect: Mood normal.         Behavior: Behavior normal.           Current Outpatient Medications   Medication Sig    dulaglutide (TRULICITY) 1.29 KU/1.6 mL sub-q pen 0.5 mL by SubCUTAneous route every seven (7) days.     atorvastatin (LIPITOR) 20 mg tablet TAKE 1 TABLET BY MOUTH ONE TIME A DAY    buPROPion XL (WELLBUTRIN XL) 300 mg XL tablet TAKE ONE TABLET BY MOUTH EVERY MORNING    ergocalciferol (ERGOCALCIFEROL) 1,250 mcg (50,000 unit) capsule Take 1 Capsule by mouth every seven (7) days.  sertraline (ZOLOFT) 100 mg tablet TAKE 1 TABLET BY MOUTH ONE TIME A DAY    cephALEXin (Keflex) 500 mg capsule Take 1 Capsule by mouth three (3) times daily for 7 days.  dexamethasone (DECADRON) 0.1 % ophthalmic solution Apply 1-2 drops in each ear as needed for itching. No current facility-administered medications for this visit. 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    HX WISDOM TEETH EXTRACTION        Social History     Tobacco Use    Smoking status: Never Smoker    Smokeless tobacco: Never Used   Substance Use Topics    Alcohol use: Yes     Comment: rare      Family History   Problem Relation Age of Onset    Cancer Father         bladder and skin cancer    Elevated Lipids Father     Stroke Father         x 2    Dementia Mother     Anesth Problems Neg Hx     Clotting Disorder Neg Hx         Allergies   Allergen Reactions    Levaquin [Levofloxacin] Rash    Pcn [Penicillins] Rash    Percocet [Oxycodone-Acetaminophen] Nausea and Vomiting     \"It upsets my stomach. \"        Assessment/Plan  Diagnoses and all orders for this visit:    1. Type 2 diabetes mellitus with complication, without long-term current use of insulin (HCC)-has tolerated this in the past.  We will resume Trulicity.   She will let us know in a month or so she wants to go up to the 1.5 mg dose. Weight has trended up mainly due to stress. -     dulaglutide (TRULICITY) 4.41 MD/0.8 mL sub-q pen; 0.5 mL by SubCUTAneous route every seven (7) days. 2. Hyperlipidemia, unspecified hyperlipidemia type  -     atorvastatin (LIPITOR) 20 mg tablet; TAKE 1 TABLET BY MOUTH ONE TIME A DAY    3. Anxiety and depression-see above. Stressors are high. She believes that no changes are needed to medications at this time. -     buPROPion XL (WELLBUTRIN XL) 300 mg XL tablet; TAKE ONE TABLET BY MOUTH EVERY MORNING  -     sertraline (ZOLOFT) 100 mg tablet; TAKE 1 TABLET BY MOUTH ONE TIME A DAY    4. Vitamin D deficiency  -     ergocalciferol (ERGOCALCIFEROL) 1,250 mcg (50,000 unit) capsule; Take 1 Capsule by mouth every seven (7) days. Bj Hook, was evaluated through a synchronous (real-time) audio-video encounter. The patient (or guardian if applicable) is aware that this is a billable service, which includes applicable co-pays. This Virtual Visit was conducted with patient's (and/or legal guardian's) consent. The visit was conducted pursuant to the emergency declaration under the [address] [place] [address], [address] [place] [address] authority and the [place] and [employer] General Act. Patient identification was verified, and a caregiver was present when appropriate. The patient was located in a state where the provider was licensed to provide care. Anselmo Rodriguez MD  5/31/2022    This note was created with the help of speech recognition software [employer]) and may contain some 'sound alike' errors.

## 2022-05-31 NOTE — PROGRESS NOTES
Kelly Ruvalcaba  Identified pt with two pt identifiers(name and ). Chief Complaint   Patient presents with    Medication Refill     VV// pt presents today for medication refill       1. Have you been to the ER, urgent care clinic since your last visit? Hospitalized since your last visit? NO    2. Have you seen or consulted any other health care providers outside of the [address] [place] [address] since your last visit? Include any pap smears or colon screening. NO      Provider notified of reason for visit, vitals and flowsheets obtained on patients. Patient received paperwork for advance directive during previous visit but has not completed at this time     Reviewed record In preparation for visit, huddled with provider and have obtained necessary documentation      Health Maintenance Due   Topic    Pneumococcal 0-64 years (1 - PCV)    Eye Exam Retinal or Dilated     Cervical cancer screen     Shingrix Vaccine Age 50> (1 of 2)    Breast Cancer Screen Mammogram     Depression Monitoring     COVID-19 Vaccine (3 - Booster for Pfizer series)    Foot Exam Q1        Wt Readings from Last 3 Encounters:   22 240 lb (108.9 kg)   21 259 lb 3.2 oz (117.6 kg)   21 238 lb (108 kg)     Temp Readings from Last 3 Encounters:   22 97.5 °F (36.4 °C)   21 98.1 °F (36.7 °C) (Oral)   21 98.5 °F (36.9 °C) (Oral)     BP Readings from Last 3 Encounters:   22 127/70   21 117/81   21 105/69     Pulse Readings from Last 3 Encounters:   22 91   21 74   21 93     There were no vitals filed for this visit.       Learning Assessment:  :     Learning Assessment 6/3/2016 2014 2014   PRIMARY LEARNER Patient Patient Patient   HIGHEST LEVEL OF EDUCATION - PRIMARY LEARNER  - 4 YEARS OF COLLEGE -   BARRIERS PRIMARY LEARNER - NONE -   CO-LEARNER CAREGIVER - No -   PRIMARY LANGUAGE ENGLISH ENGLISH ENGLISH   LEARNER PREFERENCE PRIMARY READING LISTENING LISTENING ANSWERED BY patient Patient patient   RELATIONSHIP SELF SELF SELF       Depression Screening:  :     3 most recent PHQ Screens 8/18/2020   Little interest or pleasure in doing things Several days   Feeling down, depressed, irritable, or hopeless Several days   Total Score PHQ 2 2       Fall Risk Assessment:  :     Fall Risk Assessment, last 12 mths 8/18/2020   Able to walk? Yes   Fall in past 12 months? No       Abuse Screening:  :     Abuse Screening Questionnaire 8/18/2020 1/16/2020 12/17/2019 8/27/2019 7/2/2019 7/19/2018   Do you ever feel afraid of your partner? N N N N N N   Are you in a relationship with someone who physically or mentally threatens you? N N N N N N   Is it safe for you to go home? Y Y Y Y Y Y       ADL Screening:  :     No flowsheet data found. Medication reconciliation up to date and corrected with patient at this time.

## 2022-05-31 NOTE — Clinical Note
Can you please schedule her to see me in about 3 months for a follow-up. Make sure that her appointment with Dr. Agueda Lee stays on the schedule, but you  can cancel 22 June with me.

## 2022-06-08 ENCOUNTER — OFFICE VISIT (OUTPATIENT)
Dept: INTERNAL MEDICINE CLINIC | Age: 57
End: 2022-06-08
Payer: COMMERCIAL

## 2022-06-08 VITALS
HEIGHT: 65 IN | WEIGHT: 261 LBS | OXYGEN SATURATION: 97 % | DIASTOLIC BLOOD PRESSURE: 74 MMHG | SYSTOLIC BLOOD PRESSURE: 122 MMHG | HEART RATE: 78 BPM | RESPIRATION RATE: 16 BRPM | BODY MASS INDEX: 43.49 KG/M2 | TEMPERATURE: 98.9 F

## 2022-06-08 DIAGNOSIS — E11.8 TYPE 2 DIABETES MELLITUS WITH COMPLICATION, WITHOUT LONG-TERM CURRENT USE OF INSULIN (HCC): Primary | ICD-10-CM

## 2022-06-08 DIAGNOSIS — Z48.02 VISIT FOR SUTURE REMOVAL: ICD-10-CM

## 2022-06-08 DIAGNOSIS — E78.5 HYPERLIPIDEMIA, UNSPECIFIED HYPERLIPIDEMIA TYPE: ICD-10-CM

## 2022-06-08 PROCEDURE — S0630 REMOVAL OF SUTURES: HCPCS | Performed by: INTERNAL MEDICINE

## 2022-06-08 PROCEDURE — 99214 OFFICE O/P EST MOD 30 MIN: CPT | Performed by: INTERNAL MEDICINE

## 2022-09-01 ENCOUNTER — OFFICE VISIT (OUTPATIENT)
Dept: INTERNAL MEDICINE CLINIC | Age: 57
End: 2022-09-01
Payer: COMMERCIAL

## 2022-09-01 VITALS
WEIGHT: 254 LBS | SYSTOLIC BLOOD PRESSURE: 122 MMHG | RESPIRATION RATE: 16 BRPM | DIASTOLIC BLOOD PRESSURE: 82 MMHG | HEART RATE: 75 BPM | OXYGEN SATURATION: 94 % | TEMPERATURE: 98.3 F | HEIGHT: 65 IN | BODY MASS INDEX: 42.32 KG/M2

## 2022-09-01 DIAGNOSIS — E66.01 OBESITY, MORBID (HCC): ICD-10-CM

## 2022-09-01 DIAGNOSIS — E11.8 TYPE 2 DIABETES MELLITUS WITH COMPLICATION, WITHOUT LONG-TERM CURRENT USE OF INSULIN (HCC): Primary | ICD-10-CM

## 2022-09-01 DIAGNOSIS — F32.A ANXIETY AND DEPRESSION: ICD-10-CM

## 2022-09-01 DIAGNOSIS — F41.9 ANXIETY AND DEPRESSION: ICD-10-CM

## 2022-09-01 DIAGNOSIS — H92.03 OTALGIA OF BOTH EARS: ICD-10-CM

## 2022-09-01 DIAGNOSIS — E78.5 HYPERLIPIDEMIA, UNSPECIFIED HYPERLIPIDEMIA TYPE: ICD-10-CM

## 2022-09-01 PROCEDURE — 99214 OFFICE O/P EST MOD 30 MIN: CPT | Performed by: INTERNAL MEDICINE

## 2022-09-01 RX ORDER — DEXAMETHASONE SODIUM PHOSPHATE 1 MG/ML
SOLUTION/ DROPS OPHTHALMIC
Qty: 5 ML | Refills: 2 | Status: SHIPPED | OUTPATIENT
Start: 2022-09-01

## 2022-09-01 NOTE — PROGRESS NOTES
Kelly Ruvalcaba  Identified pt with two pt identifiers(name and ). Chief Complaint   Patient presents with    Medication Evaluation     RM21// pt presenting today for routine  medication chk       1. Have you been to the ER, urgent care clinic since your last visit? Hospitalized since your last visit? NO    2. Have you seen or consulted any other health care providers outside of the [address] [place] [address] since your last visit? Include any pap smears or colon screening. NO      Provider notified of reason for visit, vitals and flowsheets obtained on patients.      Patient received paperwork for advance directive during previous visit but has not completed at this time     Reviewed record In preparation for visit, huddled with provider and have obtained necessary documentation      Health Maintenance Due   Topic    Pneumococcal 0-64 years (1 - PCV)    Eye Exam Retinal or Dilated     Cervical cancer screen     Shingrix Vaccine Age 50> (1 of 2)    Breast Cancer Screen Mammogram     COVID-19 Vaccine (3 - Booster for Pfizer series)    Flu Vaccine (1)       Wt Readings from Last 3 Encounters:   22 254 lb (115.2 kg)   22 261 lb (118.4 kg)   22 240 lb (108.9 kg)     Temp Readings from Last 3 Encounters:   22 98.3 °F (36.8 °C) (Oral)   22 98.9 °F (37.2 °C) (Oral)   22 97.5 °F (36.4 °C)     BP Readings from Last 3 Encounters:   22 (!) 146/73   22 122/74   22 127/70     Pulse Readings from Last 3 Encounters:   22 75   22 78   22 91     Vitals:    22 0946   BP: (!) 146/73   Pulse: 75   Resp: 16   Temp: 98.3 °F (36.8 °C)   TempSrc: Oral   SpO2: 94%   Weight: 254 lb (115.2 kg)   Height: 5' 5\" (1.651 m)   PainSc:   0 - No pain   LMP: 2016         Learning Assessment:  :     Learning Assessment 6/3/2016 2014 2014   PRIMARY LEARNER Patient Patient Patient   HIGHEST LEVEL OF EDUCATION - PRIMARY LEARNER  - [address] [place] [address] PRIMARY LEARNER - NONE -   CO-LEARNER CAREGIVER - No -   PRIMARY LANGUAGE ENGLISH ENGLISH ENGLISH   LEARNER PREFERENCE PRIMARY READING LISTENING LISTENING   ANSWERED BY patient Patient patient   RELATIONSHIP SELF SELF SELF       Depression Screening:  :     3 most recent PHQ Screens 9/1/2022   Little interest or pleasure in doing things Not at all   Feeling down, depressed, irritable, or hopeless Not at all   Total Score PHQ 2 0       Fall Risk Assessment:  :     Fall Risk Assessment, last 12 mths 8/18/2020   Able to walk? Yes   Fall in past 12 months? No       Abuse Screening:  :     Abuse Screening Questionnaire 8/18/2020 1/16/2020 12/17/2019 8/27/2019 7/2/2019 7/19/2018   Do you ever feel afraid of your partner? N N N N N N   Are you in a relationship with someone who physically or mentally threatens you? N N N N N N   Is it safe for you to go home? Y Y Y Y Y Y       ADL Screening:  :     No flowsheet data found. Medication reconciliation up to date and corrected with patient at this time.

## 2022-09-01 NOTE — PROGRESS NOTES
Chastity Morris is a 61 y.o. female who presents today for Medication Evaluation (RM21// pt presenting today for routine  medication chk)  . She has a history of   Patient Active Problem List   Diagnosis Code    New onset of headaches after age 52 R50.7    Visual changes H53.9    Obesity, morbid ([address] E66.01    Hyperlipidemia, unspecified hyperlipidemia type E78.5    Anxiety and depression F41.9, F32.A    IFG (impaired fasting glucose) R73.01    Type 2 diabetes mellitus with complication, without long-term current use of insulin (HCC) E11.8    Primary osteoarthritis of right knee M17.11   . Today patient is here for follow-up. DM: Patient's blood sugars have been relatively well controlled. We have started her on Trulicity with the hopes of weight loss. Weight has improved. She does report some frustration over her weight moving very quickly. We discussed that slow and steady weight loss is what should be as expected. Discussed increasing dose of trulicity. Mental health is still an issue, still some stress with her son. Had a close friend that passed away this summer. Overall feels as though she is doing okay considering what she is dealing with. Does not believe that she needs a therapist or counselor at this time. Does not want make any changes to her medications. Hyperlipidemia  Repeat today. ROS: taking medications as instructed, no medication side effects noted  No new myalgias, no joint pains, no weakness  No TIA's, no chest pain on exertion, no dyspnea on exertion, no swelling of ankles. Lab Results   Component Value Date/Time    Cholesterol, total 258 (H) 11/23/2021 08:50 AM    HDL Cholesterol 55 11/23/2021 08:50 AM    LDL, calculated 175.4 (H) 11/23/2021 08:50 AM    VLDL, calculated 27.6 11/23/2021 08:50 AM    Triglyceride 138 11/23/2021 08:50 AM    CHOL/HDL Ratio 4.7 11/23/2021 08:50 AM         ROS  Review of Systems   Constitutional:  Negative for chills, fever and weight loss. HENT:  Negative for congestion and sore throat. Eyes:  Negative for blurred vision, double vision and photophobia. Respiratory:  Negative for cough and shortness of breath. Cardiovascular:  Negative for chest pain, palpitations and leg swelling. Gastrointestinal:  Negative for abdominal pain, constipation, diarrhea, heartburn, nausea and vomiting. Genitourinary:  Negative for dysuria, frequency and urgency. Musculoskeletal:  Negative for joint pain and myalgias. Healing scar to right lower extremity   Skin:  Negative for rash. Neurological: Negative. Negative for headaches. Endo/Heme/Allergies:  Does not bruise/bleed easily. Psychiatric/Behavioral:  Positive for depression. Negative for hallucinations, memory loss and suicidal ideas. The patient is nervous/anxious. Visit Vitals  /82   Pulse 75   Temp 98.3 °F (36.8 °C) (Oral)   Resp 16   Ht 5' 5\" (1.651 m)   Wt 254 lb (115.2 kg)   SpO2 94%   BMI 42.27 kg/m²       Physical Exam  Constitutional:       General: She is not in acute distress. Appearance: She is well-developed. HENT:      Head: Normocephalic and atraumatic. Right Ear: Tympanic membrane normal.      Left Ear: Tympanic membrane normal.   Neck:      Thyroid: No thyromegaly. Cardiovascular:      Rate and Rhythm: Normal rate and regular rhythm. Heart sounds: No murmur heard. Pulmonary:      Effort: Pulmonary effort is normal.      Breath sounds: Normal breath sounds. No wheezing. Abdominal:      General: Bowel sounds are normal. There is no distension. Palpations: Abdomen is soft. Musculoskeletal:      Cervical back: Normal range of motion and neck supple. Skin:     General: Skin is warm and dry. Neurological:      Mental Status: She is alert and oriented to person, place, and time. Cranial Nerves: No cranial nerve deficit.    Psychiatric:         Behavior: Behavior normal.         Current Outpatient Medications   Medication Sig dexamethasone (DECADRON) 0.1 % ophthalmic solution Apply 1-2 drops in each ear as needed for itching. dulaglutide (TRULICITY) 1.5 AL/8.1 mL sub-q pen 0.5 mL by SubCUTAneous route every seven (7) days. atorvastatin (LIPITOR) 20 mg tablet TAKE 1 TABLET BY MOUTH ONE TIME A DAY    buPROPion XL (WELLBUTRIN XL) 300 mg XL tablet TAKE ONE TABLET BY MOUTH EVERY MORNING    ergocalciferol (ERGOCALCIFEROL) 1,250 mcg (50,000 unit) capsule Take 1 Capsule by mouth every seven (7) days. sertraline (ZOLOFT) 100 mg tablet TAKE 1 TABLET BY MOUTH ONE TIME A DAY     No current facility-administered medications for this visit. 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 \"        Assessment/Plan  Diagnoses and all orders for this visit:    1. Type 2 diabetes mellitus with complication, without long-term current use of insulin (HCC)-overall I am encouraged by her slow and steady weight loss. Increase Trulicity to 1.5 mg. Denies any side effects  -     HEMOGLOBIN A1C WITH EAG; Future  -     METABOLIC PANEL, COMPREHENSIVE; Future  -     dulaglutide (TRULICITY) 1.5 AR/8.2 mL sub-q pen; 0.5 mL by SubCUTAneous route every seven (7) days. 2. Otalgia of both ears-refill  -     dexamethasone (DECADRON) 0.1 % ophthalmic solution; Apply 1-2 drops in each ear as needed for itching. 3. Anxiety and depression-mental health is low but this is situational due to ongoing problems. Does not believe that a therapist would help at this time. Continue to monitor her mental health  -     HEMOGLOBIN A1C WITH EAG; Future    4. Hyperlipidemia, unspecified hyperlipidemia type-repeat  -     LIPID PANEL; Future    5. Obesity, morbid (HCC)-congratulated on weight loss. Adonis Ge MD  9/1/2022    This note was created with the help of speech recognition software Rosette Cool) and may contain some 'sound alike' errors.

## 2022-09-02 LAB
ALBUMIN SERPL-MCNC: 3.9 G/DL (ref 3.5–5)
ALBUMIN/GLOB SERPL: 1.3 {RATIO} (ref 1.1–2.2)
ALP SERPL-CCNC: 61 U/L (ref 45–117)
ALT SERPL-CCNC: 18 U/L (ref 12–78)
ANION GAP SERPL CALC-SCNC: 5 MMOL/L (ref 5–15)
AST SERPL-CCNC: 16 U/L (ref 15–37)
BILIRUB SERPL-MCNC: 0.3 MG/DL (ref 0.2–1)
BUN SERPL-MCNC: 12 MG/DL (ref 6–20)
BUN/CREAT SERPL: 18 (ref 12–20)
CALCIUM SERPL-MCNC: 9 MG/DL (ref 8.5–10.1)
CHLORIDE SERPL-SCNC: 109 MMOL/L (ref 97–108)
CHOLEST SERPL-MCNC: 211 MG/DL
CO2 SERPL-SCNC: 27 MMOL/L (ref 21–32)
CREAT SERPL-MCNC: 0.68 MG/DL (ref 0.55–1.02)
EST. AVERAGE GLUCOSE BLD GHB EST-MCNC: 117 MG/DL
GLOBULIN SER CALC-MCNC: 3 G/DL (ref 2–4)
GLUCOSE SERPL-MCNC: 96 MG/DL (ref 65–100)
HBA1C MFR BLD: 5.7 % (ref 4–5.6)
HDLC SERPL-MCNC: 61 MG/DL
HDLC SERPL: 3.5 {RATIO} (ref 0–5)
LDLC SERPL CALC-MCNC: 127.2 MG/DL (ref 0–100)
POTASSIUM SERPL-SCNC: 4.4 MMOL/L (ref 3.5–5.1)
PROT SERPL-MCNC: 6.9 G/DL (ref 6.4–8.2)
SODIUM SERPL-SCNC: 141 MMOL/L (ref 136–145)
TRIGL SERPL-MCNC: 114 MG/DL (ref ?–150)
VLDLC SERPL CALC-MCNC: 22.8 MG/DL

## 2022-11-26 DIAGNOSIS — F32.A ANXIETY AND DEPRESSION: ICD-10-CM

## 2022-11-26 DIAGNOSIS — F41.9 ANXIETY AND DEPRESSION: ICD-10-CM

## 2022-11-26 RX ORDER — SERTRALINE HYDROCHLORIDE 100 MG/1
TABLET, FILM COATED ORAL
Qty: 90 TABLET | Refills: 1 | Status: SHIPPED | OUTPATIENT
Start: 2022-11-26

## 2022-11-26 RX ORDER — BUPROPION HYDROCHLORIDE 300 MG/1
TABLET ORAL
Qty: 90 TABLET | Refills: 1 | Status: SHIPPED | OUTPATIENT
Start: 2022-11-26

## 2023-03-06 NOTE — PROGRESS NOTES
Patti Garcia is a 61 y.o. female who presents today for Physical (RM21// pt presenting today for annual CPE; with no concerns)  . She has a history of   Patient Active Problem List   Diagnosis Code    New onset of headaches after age 52 R50.7    Visual changes H53.9    Obesity, morbid ([address] E66.01    Hyperlipidemia, unspecified hyperlipidemia type E78.5    Anxiety and depression F41.9, F32.A    IFG (impaired fasting glucose) R73.01    Type 2 diabetes mellitus with complication, without long-term current use of insulin (HCC) E11.8    Primary osteoarthritis of right knee M17.11   . Today patient is here for CPE. Hyperlipidemia  On atorvastatin. ROS: taking medications as instructed, no medication side effects noted  No new myalgias, no joint pains, no weakness  No TIA's, no chest pain on exertion, no dyspnea on exertion, no swelling of ankles. Lab Results   Component Value Date/Time    Cholesterol, total 211 (H) 09/01/2022 10:40 AM    HDL Cholesterol 61 09/01/2022 10:40 AM    LDL, calculated 127.2 (H) 09/01/2022 10:40 AM    VLDL, calculated 22.8 09/01/2022 10:40 AM    Triglyceride 114 09/01/2022 10:40 AM    CHOL/HDL Ratio 3.5 09/01/2022 10:40 AM     DM: on trulicity. Due to have labs. Weight down. Feeling well. Would like to increase dose to 3 mg. Mental Health is stable. On zoloft and wellbutrin. Stressors are stable. Stressors are still high. Patient does note some short-term memory issue and recall problems. Attention tends to be a problem as well. This concerned her as there is heavy incidence of dementia on her mother side. Bilateral hand and thumb pain. This is most prominent in the morning. Does have a history of arthritis on her dad side, but no inflammatory arthritis    Health maintenance hx includes:  Exercise: moderately active. Form of exercise: less exercise recently   Diet: generally follows a low fat low cholesterol diet  Social: At home with .  Working with a CP patient. Screening:    Colon cancer screening: Cologuard done in 2021   Breast cancer screening: Will do with GYN   Cervical cancer screening: Due. Osteoporosis screening:N/A      Immunizations:     Immunization History   Administered Date(s) Administered    COVID-19, PFIZER PURPLE top, DILUTE for use, (age 15 y+), IM, 30mcg/0.3mL 04/03/2021, 04/24/2021    Influenza Vaccine 10/23/2018, 10/21/2019    Influenza, FLUARIX, FLULAVAL, FLUZONE (age 10 mo+) AND AFLURIA, (age 1 y+), PF, 0.5mL 11/23/2021      Immunization status: Discussed shingrix and COVID booster. .      ROS  Review of Systems   Constitutional:  Negative for chills, fever and weight loss. HENT:  Negative for congestion and sore throat. Eyes:  Negative for blurred vision, double vision and photophobia. Respiratory:  Negative for cough and shortness of breath. Cardiovascular:  Negative for chest pain, palpitations and leg swelling. Gastrointestinal:  Negative for abdominal pain, constipation, diarrhea, heartburn, nausea and vomiting. Genitourinary:  Negative for dysuria, frequency and urgency. Musculoskeletal:  Positive for joint pain and myalgias. Skin:  Positive for rash. Neurological: Negative. Negative for headaches. Endo/Heme/Allergies:  Does not bruise/bleed easily. Psychiatric/Behavioral:  Positive for depression and memory loss. Negative for suicidal ideas. Visit Vitals  /76 (BP 1 Location: Left upper arm, BP Patient Position: Sitting, BP Cuff Size: Large adult)   Pulse 84   Temp 98.2 °F (36.8 °C) (Oral)   Resp 14   Ht 5' 5\" (1.651 m)   Wt 234 lb 9.6 oz (106.4 kg)   SpO2 97%   BMI 39.04 kg/m²       Physical Exam  Constitutional:       General: She is not in acute distress. Appearance: She is well-developed. HENT:      Head: Normocephalic and atraumatic. Ears:      Comments: Eczema to ears bilaterally  Neck:      Thyroid: No thyromegaly.    Cardiovascular:      Rate and Rhythm: Normal rate and regular rhythm. Heart sounds: No murmur heard. Pulmonary:      Effort: Pulmonary effort is normal. No respiratory distress. Breath sounds: Normal breath sounds. No wheezing. Abdominal:      General: Bowel sounds are normal. There is no distension. Palpations: Abdomen is soft. Musculoskeletal:      Cervical back: Normal range of motion and neck supple. Skin:     General: Skin is warm and dry. Neurological:      Mental Status: She is alert and oriented to person, place, and time. Cranial Nerves: No cranial nerve deficit. Psychiatric:         Behavior: Behavior normal.         Current Outpatient Medications   Medication Sig    buPROPion XL (WELLBUTRIN XL) 300 mg XL tablet TAKE 1 TABLET BY MOUTH EVERY DAY IN THE MORNING    sertraline (ZOLOFT) 100 mg tablet TAKE 1 TABLET BY MOUTH EVERY DAY    dexamethasone (DECADRON) 0.1 % ophthalmic solution Apply 1-2 drops in each ear as needed for itching. dulaglutide (TRULICITY) 1.5 RG/8.2 mL sub-q pen 0.5 mL by SubCUTAneous route every seven (7) days. atorvastatin (LIPITOR) 20 mg tablet TAKE 1 TABLET BY MOUTH ONE TIME A DAY    ergocalciferol (ERGOCALCIFEROL) 1,250 mcg (50,000 unit) capsule Take 1 Capsule by mouth every seven (7) days. No current facility-administered medications for this visit.         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    HX WISDOM TEETH EXTRACTION        Social History     Tobacco Use    Smoking status: Never    Smokeless tobacco: Never   Substance Use Topics Alcohol use: Not Currently     Comment: rare      Family History   Problem Relation Age of Onset    Cancer Father         bladder and skin cancer    Elevated Lipids Father     Stroke Father         x 2    OSTEOARTHRITIS Father     Lung Disease Father     Dementia Mother     Gall Bladder Disease Mother     Hypertension Paternal Grandfather     Stroke Paternal Grandfather     OSTEOARTHRITIS Paternal Grandmother     Hypertension Paternal Grandmother     Anesth Problems Neg Hx     Clotting Disorder Neg Hx         Allergies   Allergen Reactions    Levaquin [Levofloxacin] Rash    Pcn [Penicillins] Rash    Percocet [Oxycodone-Acetaminophen] Nausea and Vomiting     \"It upsets my stomach. \"        Assessment/Plan  Diagnoses and all orders for this visit:    1. Wellness examination- Needs GYN care and MMG. Karen Hdez was counseled on age-appropriate/ guideline-based risk prevention behaviors and screening for a 61y.o. year old   female . We also discussed adjustments in screening based on family history if necessary. Printed instructions for preventative screening guidelines and healthy behaviors given to patient with after visit summary.    -     REFERRAL TO OBSTETRICS AND GYNECOLOGY; Future    2. Anxiety and depression-stable. Some short-term memory concerns and attention issues. More than likely situational and not associated with dementia, though she does have strong family history on mother side. Low threshold for neuropsych testing if this persist.  Continue current antidepressants    3. Type 2 diabetes mellitus with complication, without long-term current use of insulin (HCC)-doing well, congratulated on weight loss  -     MICROALBUMIN, UR, RAND W/ MICROALB/CREAT RATIO; Future  -     METABOLIC PANEL, COMPREHENSIVE; Future  -     HEMOGLOBIN A1C WITH EAG; Future  -     CBC WITH AUTOMATED DIFF; Future  -     dulaglutide (Trulicity) 3 FF/4.0 mL pnij; 3 mg by SubCUTAneous route every seven (7) days.     4. Hyperlipidemia, unspecified hyperlipidemia type  -     CBC WITH AUTOMATED DIFF; Future  -     LIPID PANEL; Future    5. Obesity, morbid ([address]    6. Pain in both hands-reports a history of positive JUSTIN  -     SED RATE (ESR); Future  -     C REACTIVE PROTEIN, QT; Future  -     RA + CCP ABS; Future  -     JUSTIN, DIRECT, W/REFLEX; Future    7. Eczema, unspecified type-mainly to external ear canal.  Topical ointment  -     triamcinolone acetonide (KENALOG) 0.1 % ointment; Apply  to affected area two (2) times a day. use thin layer    8. Memory changes-see above          Isabela Parker MD  3/7/2023    This note was created with the help of speech recognition software Channing Chin) and may contain some 'sound alike' errors.

## 2023-03-07 ENCOUNTER — OFFICE VISIT (OUTPATIENT)
Dept: INTERNAL MEDICINE CLINIC | Age: 58
End: 2023-03-07
Payer: COMMERCIAL

## 2023-03-07 VITALS
SYSTOLIC BLOOD PRESSURE: 113 MMHG | TEMPERATURE: 98.2 F | HEART RATE: 84 BPM | HEIGHT: 65 IN | BODY MASS INDEX: 39.09 KG/M2 | RESPIRATION RATE: 14 BRPM | DIASTOLIC BLOOD PRESSURE: 76 MMHG | WEIGHT: 234.6 LBS | OXYGEN SATURATION: 97 %

## 2023-03-07 DIAGNOSIS — R41.3 MEMORY CHANGES: ICD-10-CM

## 2023-03-07 DIAGNOSIS — E11.8 TYPE 2 DIABETES MELLITUS WITH COMPLICATION, WITHOUT LONG-TERM CURRENT USE OF INSULIN (HCC): ICD-10-CM

## 2023-03-07 DIAGNOSIS — F32.A ANXIETY AND DEPRESSION: ICD-10-CM

## 2023-03-07 DIAGNOSIS — Z00.00 WELLNESS EXAMINATION: Primary | ICD-10-CM

## 2023-03-07 DIAGNOSIS — F41.9 ANXIETY AND DEPRESSION: ICD-10-CM

## 2023-03-07 DIAGNOSIS — L30.9 ECZEMA, UNSPECIFIED TYPE: ICD-10-CM

## 2023-03-07 DIAGNOSIS — M79.642 PAIN IN BOTH HANDS: ICD-10-CM

## 2023-03-07 DIAGNOSIS — E78.5 HYPERLIPIDEMIA, UNSPECIFIED HYPERLIPIDEMIA TYPE: ICD-10-CM

## 2023-03-07 DIAGNOSIS — M79.641 PAIN IN BOTH HANDS: ICD-10-CM

## 2023-03-07 DIAGNOSIS — E66.01 OBESITY, MORBID (HCC): ICD-10-CM

## 2023-03-07 PROCEDURE — 99396 PREV VISIT EST AGE 40-64: CPT | Performed by: INTERNAL MEDICINE

## 2023-03-07 RX ORDER — TRIAMCINOLONE ACETONIDE 1 MG/G
OINTMENT TOPICAL 2 TIMES DAILY
Qty: 30 G | Refills: 0 | Status: SHIPPED | OUTPATIENT
Start: 2023-03-07

## 2023-03-07 RX ORDER — DULAGLUTIDE 3 MG/.5ML
3 INJECTION, SOLUTION SUBCUTANEOUS
Qty: 12 EACH | Refills: 1 | Status: SHIPPED | OUTPATIENT
Start: 2023-03-07

## 2023-05-23 RX ORDER — DEXAMETHASONE SODIUM PHOSPHATE 1 MG/ML
SOLUTION/ DROPS OPHTHALMIC
COMMUNITY
Start: 2022-09-01

## 2023-05-23 RX ORDER — ATORVASTATIN CALCIUM 20 MG/1
1 TABLET, FILM COATED ORAL DAILY
COMMUNITY
Start: 2022-05-31 | End: 2023-05-24

## 2023-05-23 RX ORDER — DULAGLUTIDE 3 MG/.5ML
3 INJECTION, SOLUTION SUBCUTANEOUS
COMMUNITY
Start: 2023-03-07

## 2023-05-23 RX ORDER — BUPROPION HYDROCHLORIDE 300 MG/1
1 TABLET ORAL EVERY MORNING
COMMUNITY
Start: 2022-11-26 | End: 2023-05-24

## 2023-05-23 RX ORDER — ERGOCALCIFEROL 1.25 MG/1
50000 CAPSULE ORAL
COMMUNITY
Start: 2022-05-31

## 2023-05-23 RX ORDER — SERTRALINE HYDROCHLORIDE 100 MG/1
1 TABLET, FILM COATED ORAL DAILY
COMMUNITY
Start: 2022-11-26 | End: 2023-05-24

## 2023-05-24 DIAGNOSIS — F41.9 ANXIETY DISORDER, UNSPECIFIED: ICD-10-CM

## 2023-05-24 DIAGNOSIS — E78.5 HYPERLIPIDEMIA, UNSPECIFIED: ICD-10-CM

## 2023-05-24 RX ORDER — SERTRALINE HYDROCHLORIDE 100 MG/1
TABLET, FILM COATED ORAL
Qty: 90 TABLET | Refills: 1 | Status: SHIPPED | OUTPATIENT
Start: 2023-05-24

## 2023-05-24 RX ORDER — ATORVASTATIN CALCIUM 20 MG/1
TABLET, FILM COATED ORAL
Qty: 90 TABLET | Refills: 1 | Status: SHIPPED | OUTPATIENT
Start: 2023-05-24

## 2023-05-24 RX ORDER — BUPROPION HYDROCHLORIDE 300 MG/1
TABLET ORAL
Qty: 90 TABLET | Refills: 1 | Status: SHIPPED | OUTPATIENT
Start: 2023-05-24

## 2023-09-06 ENCOUNTER — TELEPHONE (OUTPATIENT)
Age: 58
End: 2023-09-06

## 2023-09-06 RX ORDER — DULAGLUTIDE 3 MG/.5ML
3 INJECTION, SOLUTION SUBCUTANEOUS
Qty: 3 ADJUSTABLE DOSE PRE-FILLED PEN SYRINGE | Refills: 2 | Status: SHIPPED | OUTPATIENT
Start: 2023-09-06

## 2023-09-06 NOTE — TELEPHONE ENCOUNTER
----- Message from [place], [place] sent at 9/6/2023  9:44 AM EDT -----  Subject: Refill Request    QUESTIONS  Name of Medication? Dulaglutide (TRULICITY) 3 YJ/2.ApolinarUR SOPN  Patient-reported dosage and instructions? 3 mg  How many days do you have left? 14  Preferred Pharmacy? [id number]  Pharmacy phone number (if available)? [phone]  ---------------------------------------------------------------------------  --------------  [employer] INFO  What is the best way for the office to contact you? OK to leave message on   voicemail  Preferred Call Back Phone Number? [phone]  ---------------------------------------------------------------------------  --------------  SCRIPT ANSWERS  Relationship to Patient?  Self

## 2023-10-03 ENCOUNTER — OFFICE VISIT (OUTPATIENT)
Age: 58
End: 2023-10-03
Payer: COMMERCIAL

## 2023-10-03 VITALS
WEIGHT: 231.8 LBS | SYSTOLIC BLOOD PRESSURE: 117 MMHG | DIASTOLIC BLOOD PRESSURE: 74 MMHG | HEIGHT: 65 IN | BODY MASS INDEX: 38.62 KG/M2

## 2023-10-03 DIAGNOSIS — Z01.419 WELL WOMAN EXAM WITH ROUTINE GYNECOLOGICAL EXAM: Primary | ICD-10-CM

## 2023-10-03 DIAGNOSIS — E28.39 ESTROGEN DEFICIENCY: ICD-10-CM

## 2023-10-03 PROBLEM — E11.8 TYPE 2 DIABETES MELLITUS WITH COMPLICATION, WITHOUT LONG-TERM CURRENT USE OF INSULIN (HCC): Status: RESOLVED | Noted: 2019-07-09 | Resolved: 2023-10-03

## 2023-10-03 PROBLEM — M17.11 PRIMARY OSTEOARTHRITIS OF RIGHT KNEE: Status: RESOLVED | Noted: 2021-02-09 | Resolved: 2023-10-03

## 2023-10-03 PROCEDURE — 99396 PREV VISIT EST AGE 40-64: CPT | Performed by: OBSTETRICS & GYNECOLOGY

## 2023-10-03 NOTE — PROGRESS NOTES
Jordy Chowdary is a 60 y.o. female returns for an annual exam     Chief Complaint   Patient presents with    Annual Exam       No LMP recorded. Patient is postmenopausal.  Her periods are absent in flow. Problems: no problems  Birth Control: post menopausal status. Last Pap: normal obtained 15 year(s) ago. She does not have a history of LISA 2, 3 or cervical cancer. Last Mammogram: had her mammogram today in our office. Last Bone Density:  not obtained. Last colonoscopy: normal obtained 2 year(s) ago. Cologuard. 1. Have you been to the ER, urgent care clinic, or hospitalized since your last visit? N/A NP    2. Have you seen or consulted any other health care providers outside of the [address] [place] [address] Avenue since your last visit? N/A NP      Examination chaperoned by Janis Syed.
hernias identified    Genitourinary  External Genitalia: normal appearance for age, no discharge present, no tenderness present, no inflammatory lesions present, no masses present, no atrophy present  Vagina: normal vaginal vault without central or paravaginal defects, no discharge present, no inflammatory lesions present, no masses present  Bladder: non-tender to palpation  Urethra: appears normal  Cervix: normal   Uterus: normal size, shape and consistency  Adnexa: no adnexal tenderness present, no adnexal masses present  Perineum: perineum within normal limits, no evidence of trauma, no rashes or skin lesions present  Anus: anus within normal limits, no hemorrhoids present  Inguinal Lymph Nodes: no lymphadenopathy present    Skin  General Inspection: no rash, no lesions identified    Neurologic/Psychiatric  Mental Status:  Orientation: grossly oriented to person, place and time  Mood and Affect: mood normal, affect appropriate    Labs:  No results found for this or any previous visit (from the past 12 hour(s)).     Assessment:  Problem List Items Addressed This Visit    None  Visit Diagnoses       Well woman exam with routine gynecological exam    -  Primary    Relevant Orders    PAP IG, Aptima HPV and rfx 16/18,45 ([id number])    Estrogen deficiency        Relevant Orders    DEXA BONE DENSITY AXIAL SKELETON          Plan:  Counseled re: diet, exercise, healthy lifestyle  Rec annual mammogram    Return in about 1 year (around 10/3/2024) for Annual.    Data Unavailable

## 2023-10-05 DIAGNOSIS — R92.8 ABNORMAL MAMMOGRAM OF RIGHT BREAST: Primary | ICD-10-CM

## 2023-10-09 SDOH — ECONOMIC STABILITY: TRANSPORTATION INSECURITY
IN THE PAST 12 MONTHS, HAS LACK OF TRANSPORTATION KEPT YOU FROM MEETINGS, WORK, OR FROM GETTING THINGS NEEDED FOR DAILY LIVING?: NO

## 2023-10-09 SDOH — ECONOMIC STABILITY: FOOD INSECURITY: WITHIN THE PAST 12 MONTHS, YOU WORRIED THAT YOUR FOOD WOULD RUN OUT BEFORE YOU GOT MONEY TO BUY MORE.: NEVER TRUE

## 2023-10-09 SDOH — ECONOMIC STABILITY: INCOME INSECURITY: HOW HARD IS IT FOR YOU TO PAY FOR THE VERY BASICS LIKE FOOD, HOUSING, MEDICAL CARE, AND HEATING?: NOT VERY HARD

## 2023-10-09 SDOH — ECONOMIC STABILITY: HOUSING INSECURITY
IN THE LAST 12 MONTHS, WAS THERE A TIME WHEN YOU DID NOT HAVE A STEADY PLACE TO SLEEP OR SLEPT IN A SHELTER (INCLUDING NOW)?: NO

## 2023-10-09 SDOH — ECONOMIC STABILITY: FOOD INSECURITY: WITHIN THE PAST 12 MONTHS, THE FOOD YOU BOUGHT JUST DIDN'T LAST AND YOU DIDN'T HAVE MONEY TO GET MORE.: NEVER TRUE

## 2023-10-10 ENCOUNTER — OFFICE VISIT (OUTPATIENT)
Age: 58
End: 2023-10-10
Payer: COMMERCIAL

## 2023-10-10 VITALS
SYSTOLIC BLOOD PRESSURE: 127 MMHG | WEIGHT: 227 LBS | BODY MASS INDEX: 37.82 KG/M2 | RESPIRATION RATE: 18 BRPM | HEIGHT: 65 IN | HEART RATE: 82 BPM | OXYGEN SATURATION: 98 % | DIASTOLIC BLOOD PRESSURE: 77 MMHG | TEMPERATURE: 98.8 F

## 2023-10-10 DIAGNOSIS — E11.8 TYPE 2 DIABETES MELLITUS WITH UNSPECIFIED COMPLICATIONS (HCC): ICD-10-CM

## 2023-10-10 DIAGNOSIS — E66.01 OBESITY, MORBID (HCC): ICD-10-CM

## 2023-10-10 DIAGNOSIS — E78.5 HYPERLIPIDEMIA, UNSPECIFIED HYPERLIPIDEMIA TYPE: ICD-10-CM

## 2023-10-10 DIAGNOSIS — Z23 NEEDS FLU SHOT: ICD-10-CM

## 2023-10-10 DIAGNOSIS — F32.A DEPRESSION, UNSPECIFIED DEPRESSION TYPE: Primary | ICD-10-CM

## 2023-10-10 PROCEDURE — 90674 CCIIV4 VAC NO PRSV 0.5 ML IM: CPT | Performed by: INTERNAL MEDICINE

## 2023-10-10 PROCEDURE — 3044F HG A1C LEVEL LT 7.0%: CPT | Performed by: INTERNAL MEDICINE

## 2023-10-10 PROCEDURE — 99214 OFFICE O/P EST MOD 30 MIN: CPT | Performed by: INTERNAL MEDICINE

## 2023-10-10 PROCEDURE — 90471 IMMUNIZATION ADMIN: CPT | Performed by: INTERNAL MEDICINE

## 2023-10-10 ASSESSMENT — PATIENT HEALTH QUESTIONNAIRE - PHQ9
SUM OF ALL RESPONSES TO PHQ QUESTIONS 1-9: 0
2. FEELING DOWN, DEPRESSED OR HOPELESS: 0
SUM OF ALL RESPONSES TO PHQ QUESTIONS 1-9: 0
1. LITTLE INTEREST OR PLEASURE IN DOING THINGS: 0
SUM OF ALL RESPONSES TO PHQ QUESTIONS 1-9: 0
SUM OF ALL RESPONSES TO PHQ9 QUESTIONS 1 & 2: 0
SUM OF ALL RESPONSES TO PHQ QUESTIONS 1-9: 0

## 2023-10-10 ASSESSMENT — ENCOUNTER SYMPTOMS
CONSTIPATION: 0
SHORTNESS OF BREATH: 0
BACK PAIN: 0
ABDOMINAL PAIN: 0
DIARRHEA: 0
CHEST TIGHTNESS: 0

## 2023-10-11 LAB
ALBUMIN SERPL-MCNC: 4.1 G/DL (ref 3.5–5)
ALBUMIN/GLOB SERPL: 1.4 (ref 1.1–2.2)
ALP SERPL-CCNC: 57 U/L (ref 45–117)
ALT SERPL-CCNC: 22 U/L (ref 12–78)
ANION GAP SERPL CALC-SCNC: 9 MMOL/L (ref 5–15)
AST SERPL-CCNC: 15 U/L (ref 15–37)
BILIRUB SERPL-MCNC: 0.5 MG/DL (ref 0.2–1)
BUN SERPL-MCNC: 15 MG/DL (ref 6–20)
BUN/CREAT SERPL: 21 (ref 12–20)
CALCIUM SERPL-MCNC: 9.6 MG/DL (ref 8.5–10.1)
CHLORIDE SERPL-SCNC: 108 MMOL/L (ref 97–108)
CHOLEST SERPL-MCNC: 174 MG/DL
CO2 SERPL-SCNC: 23 MMOL/L (ref 21–32)
CREAT SERPL-MCNC: 0.73 MG/DL (ref 0.55–1.02)
EST. AVERAGE GLUCOSE BLD GHB EST-MCNC: 105 MG/DL
GLOBULIN SER CALC-MCNC: 3 G/DL (ref 2–4)
GLUCOSE SERPL-MCNC: 88 MG/DL (ref 65–100)
HBA1C MFR BLD: 5.3 % (ref 4–5.6)
HDLC SERPL-MCNC: 57 MG/DL
HDLC SERPL: 3.1 (ref 0–5)
LDLC SERPL CALC-MCNC: 101.8 MG/DL (ref 0–100)
POTASSIUM SERPL-SCNC: 4.7 MMOL/L (ref 3.5–5.1)
PROT SERPL-MCNC: 7.1 G/DL (ref 6.4–8.2)
SODIUM SERPL-SCNC: 140 MMOL/L (ref 136–145)
TRIGL SERPL-MCNC: 76 MG/DL
VLDLC SERPL CALC-MCNC: 15.2 MG/DL

## 2023-12-13 NOTE — PROGRESS NOTES
Kelly Ruvalcaba  Identified pt with two pt identifiers(name and ). Chief Complaint   Patient presents with    Physical     RM21// pt presenting today for annual CPE; with no concerns       1. Have you been to the ER, urgent care clinic since your last visit? Hospitalized since your last visit? NO    2. Have you seen or consulted any other health care providers outside of the [address] [place] [address] since your last visit? Include any pap smears or colon screening. NO      Provider notified of reason for visit, vitals and flowsheets obtained on patients.      Patient received paperwork for advance directive during previous visit but has not completed at this time     Reviewed record In preparation for visit, huddled with provider and have obtained necessary documentation      Health Maintenance Due   Topic    Pneumococcal 0-64 years (1 - PCV)    Eye Exam Retinal or Dilated     Hepatitis B Vaccine (1 of 3 - Risk 3-dose series)    Cervical cancer screen     Shingles Vaccine (1 of 2)    Breast Cancer Screen Mammogram     COVID-19 Vaccine (3 - Booster for Pfizer series)    Flu Vaccine (1)    Diabetic Alb to Cr ratio (uACR) test        Wt Readings from Last 3 Encounters:   23 234 lb 9.6 oz (106.4 kg)   22 254 lb (115.2 kg)   22 261 lb (118.4 kg)     Temp Readings from Last 3 Encounters:   23 98.2 °F (36.8 °C) (Oral)   22 98.3 °F (36.8 °C) (Oral)   22 98.9 °F (37.2 °C) (Oral)     BP Readings from Last 3 Encounters:   23 113/76   22 122/82   22 122/74     Pulse Readings from Last 3 Encounters:   23 84   22 75   22 78     Vitals:    23 0831   BP: 113/76   Pulse: 84   Resp: 14   Temp: 98.2 °F (36.8 °C)   TempSrc: Oral   SpO2: 97%   Weight: 234 lb 9.6 oz (106.4 kg)   Height: 5' 5\" (1.651 m)   PainSc:   4   PainLoc: Hand   LMP: 2016         Learning Assessment:  :     Learning Assessment 6/3/2016 2014 2014   PRIMARY LEARNER Patient Patient Patient   HIGHEST LEVEL OF EDUCATION - PRIMARY LEARNER  - 4 YEARS OF COLLEGE -   BARRIERS PRIMARY LEARNER - NONE -   CO-LEARNER CAREGIVER - No -   PRIMARY LANGUAGE ENGLISH ENGLISH ENGLISH   LEARNER PREFERENCE PRIMARY READING LISTENING LISTENING   ANSWERED BY patient Patient patient   RELATIONSHIP SELF SELF SELF       Depression Screening:  :     3 most recent PHQ Screens 3/7/2023   Little interest or pleasure in doing things Not at all   Feeling down, depressed, irritable, or hopeless Not at all   Total Score PHQ 2 0       Fall Risk Assessment:  :     Fall Risk Assessment, last 12 mths 8/18/2020   Able to walk? Yes   Fall in past 12 months? No       Abuse Screening:  :     Abuse Screening Questionnaire 8/18/2020 1/16/2020 12/17/2019 8/27/2019 7/2/2019 7/19/2018   Do you ever feel afraid of your partner? N N N N N N   Are you in a relationship with someone who physically or mentally threatens you? N N N N N N   Is it safe for you to go home? Y Y Y Y Y Y       ADL Screening:  :     No flowsheet data found. Medication reconciliation up to date and corrected with patient at this time. [No, patient denies ideation or behavior] : No, patient denies ideation or behavior [Low acute suicide risk] : Low acute suicide risk [No] : No [Not clinically indicated] : Safety Plan completed/updated (for individuals at risk): Not clinically indicated

## 2023-12-15 DIAGNOSIS — E78.5 HYPERLIPIDEMIA, UNSPECIFIED: ICD-10-CM

## 2023-12-15 DIAGNOSIS — F41.9 ANXIETY DISORDER, UNSPECIFIED: ICD-10-CM

## 2023-12-15 RX ORDER — SERTRALINE HYDROCHLORIDE 100 MG/1
TABLET, FILM COATED ORAL
Qty: 90 TABLET | Refills: 1 | Status: SHIPPED | OUTPATIENT
Start: 2023-12-15

## 2023-12-15 RX ORDER — BUPROPION HYDROCHLORIDE 300 MG/1
TABLET ORAL
Qty: 90 TABLET | Refills: 1 | Status: SHIPPED | OUTPATIENT
Start: 2023-12-15

## 2023-12-15 RX ORDER — ATORVASTATIN CALCIUM 20 MG/1
TABLET, FILM COATED ORAL
Qty: 90 TABLET | Refills: 1 | Status: SHIPPED | OUTPATIENT
Start: 2023-12-15

## 2024-01-10 ENCOUNTER — TELEPHONE (OUTPATIENT)
Age: 59
End: 2024-01-10

## 2024-01-10 DIAGNOSIS — N64.89 BREAST ASYMMETRY: Primary | ICD-10-CM

## 2024-01-10 DIAGNOSIS — E28.39 DECREASED ESTROGEN LEVEL: ICD-10-CM

## 2024-01-10 NOTE — TELEPHONE ENCOUNTER
Phoned patient verified .  Called to follow up on her mammogram done in October that needed additional views and U/S.  Patient states that she had not received a called and she called and LM at [facility] [place] and has not received call back.  She also has not been contacted to schedule DEXA.  Orders are in the system for the additional views and U/S of the breast and also for her DEXA.  Apologized that she had not been contacted gave patient scheduling number [phone] to call and have these procedures scheduled.  Told patient if any problems let me know.

## 2024-01-11 NOTE — TELEPHONE ENCOUNTER
PT name and  verified    59 yo last ov 10/3/23      Central Scheduling calling stating PT was calling them to schedule for her mammo/us and they needed orders placed, as the ones placed 10/5/23 has .  Explained conversation/message from 1/10/24 between PC and PT:  Mely Azevedo CMA         1/10/24 12:28 PM  Note     Phoned patient verified .  Called to follow up on her mammogram done in October that needed additional views and U/S.  Patient states that she had not received a called and she called and LM at [facility] and has not received call back.  She also has not been contacted to schedule DEXA.  Orders are in the system for the additional views and U/S of the breast and also for her DEXA.  Apologized that she had not been contacted gave patient scheduling number [phone] to call and have these procedures scheduled.  Told patient if any problems let me know.            Central Scheduling going to verify with PT which location she was going to and have PT contact office.      ROJELIO

## 2024-01-11 NOTE — TELEPHONE ENCOUNTER
Two patient identifiers used      58 year patient seen in the office on 10/3/2023 for ae    Patient is in need of additional breast imaging and dexa bone scan    Patient has called central scheduling and was told the orders are     Patient was advised that orders will be re sent and she can call central scheduling later this afternoon.        Orders placed as per MD verbal order so patient can schedule appointments when calling central scheduling

## 2024-03-20 RX ORDER — DULAGLUTIDE 3 MG/.5ML
3 INJECTION, SOLUTION SUBCUTANEOUS
Qty: 3 ADJUSTABLE DOSE PRE-FILLED PEN SYRINGE | Refills: 2 | Status: SHIPPED | OUTPATIENT
Start: 2024-03-20 | End: 2024-03-22 | Stop reason: SDUPTHER

## 2024-03-22 RX ORDER — DULAGLUTIDE 3 MG/.5ML
3 INJECTION, SOLUTION SUBCUTANEOUS
Qty: 3 ADJUSTABLE DOSE PRE-FILLED PEN SYRINGE | Refills: 2 | Status: SHIPPED | OUTPATIENT
Start: 2024-03-22

## 2024-04-09 ENCOUNTER — OFFICE VISIT (OUTPATIENT)
Age: 59
End: 2024-04-09
Payer: COMMERCIAL

## 2024-04-09 VITALS
HEART RATE: 76 BPM | OXYGEN SATURATION: 96 % | SYSTOLIC BLOOD PRESSURE: 130 MMHG | HEIGHT: 65 IN | RESPIRATION RATE: 16 BRPM | BODY MASS INDEX: 37.55 KG/M2 | DIASTOLIC BLOOD PRESSURE: 71 MMHG | WEIGHT: 225.4 LBS | TEMPERATURE: 98.5 F

## 2024-04-09 DIAGNOSIS — E78.5 HYPERLIPIDEMIA, UNSPECIFIED HYPERLIPIDEMIA TYPE: ICD-10-CM

## 2024-04-09 DIAGNOSIS — E11.8 TYPE 2 DIABETES MELLITUS WITH UNSPECIFIED COMPLICATIONS (HCC): ICD-10-CM

## 2024-04-09 DIAGNOSIS — F41.9 ANXIETY DISORDER, UNSPECIFIED TYPE: ICD-10-CM

## 2024-04-09 DIAGNOSIS — F32.A DEPRESSION, UNSPECIFIED DEPRESSION TYPE: ICD-10-CM

## 2024-04-09 DIAGNOSIS — E11.8 TYPE 2 DIABETES MELLITUS WITH UNSPECIFIED COMPLICATIONS (HCC): Primary | ICD-10-CM

## 2024-04-09 DIAGNOSIS — E66.01 OBESITY, MORBID (HCC): ICD-10-CM

## 2024-04-09 LAB
ANION GAP SERPL CALC-SCNC: 5 MMOL/L (ref 5–15)
BASOPHILS # BLD: 0.1 K/UL (ref 0–0.1)
BASOPHILS NFR BLD: 1 % (ref 0–1)
BUN SERPL-MCNC: 13 MG/DL (ref 6–20)
BUN/CREAT SERPL: 18 (ref 12–20)
CALCIUM SERPL-MCNC: 9.4 MG/DL (ref 8.5–10.1)
CHLORIDE SERPL-SCNC: 107 MMOL/L (ref 97–108)
CO2 SERPL-SCNC: 28 MMOL/L (ref 21–32)
CREAT SERPL-MCNC: 0.72 MG/DL (ref 0.55–1.02)
CREAT UR-MCNC: 270 MG/DL
DIFFERENTIAL METHOD BLD: NORMAL
EOSINOPHIL # BLD: 0.4 K/UL (ref 0–0.4)
EOSINOPHIL NFR BLD: 5 % (ref 0–7)
ERYTHROCYTE [DISTWIDTH] IN BLOOD BY AUTOMATED COUNT: 13.7 % (ref 11.5–14.5)
EST. AVERAGE GLUCOSE BLD GHB EST-MCNC: 105 MG/DL
GLUCOSE SERPL-MCNC: 96 MG/DL (ref 65–100)
HBA1C MFR BLD: 5.3 % (ref 4–5.6)
HCT VFR BLD AUTO: 41.7 % (ref 35–47)
HGB BLD-MCNC: 14 G/DL (ref 11.5–16)
IMM GRANULOCYTES # BLD AUTO: 0 K/UL (ref 0–0.04)
IMM GRANULOCYTES NFR BLD AUTO: 0 % (ref 0–0.5)
LYMPHOCYTES # BLD: 1.9 K/UL (ref 0.8–3.5)
LYMPHOCYTES NFR BLD: 24 % (ref 12–49)
MCH RBC QN AUTO: 30.6 PG (ref 26–34)
MCHC RBC AUTO-ENTMCNC: 33.6 G/DL (ref 30–36.5)
MCV RBC AUTO: 91.2 FL (ref 80–99)
MICROALBUMIN UR-MCNC: 2.05 MG/DL
MICROALBUMIN/CREAT UR-RTO: 8 MG/G (ref 0–30)
MONOCYTES # BLD: 0.6 K/UL (ref 0–1)
MONOCYTES NFR BLD: 7 % (ref 5–13)
NEUTS SEG # BLD: 4.9 K/UL (ref 1.8–8)
NEUTS SEG NFR BLD: 63 % (ref 32–75)
NRBC # BLD: 0 K/UL (ref 0–0.01)
NRBC BLD-RTO: 0 PER 100 WBC
PLATELET # BLD AUTO: 302 K/UL (ref 150–400)
PMV BLD AUTO: 9.5 FL (ref 8.9–12.9)
POTASSIUM SERPL-SCNC: 4.2 MMOL/L (ref 3.5–5.1)
RBC # BLD AUTO: 4.57 M/UL (ref 3.8–5.2)
SODIUM SERPL-SCNC: 140 MMOL/L (ref 136–145)
WBC # BLD AUTO: 7.9 K/UL (ref 3.6–11)

## 2024-04-09 PROCEDURE — 99214 OFFICE O/P EST MOD 30 MIN: CPT | Performed by: INTERNAL MEDICINE

## 2024-04-09 RX ORDER — DULAGLUTIDE 1.5 MG/.5ML
1.5 INJECTION, SOLUTION SUBCUTANEOUS WEEKLY
Qty: 12 ADJUSTABLE DOSE PRE-FILLED PEN SYRINGE | Refills: 1 | Status: SHIPPED | OUTPATIENT
Start: 2024-04-09

## 2024-04-09 ASSESSMENT — ENCOUNTER SYMPTOMS
CHEST TIGHTNESS: 0
SHORTNESS OF BREATH: 0
ABDOMINAL PAIN: 0
BACK PAIN: 0
DIARRHEA: 0
CONSTIPATION: 0

## 2024-04-09 NOTE — PROGRESS NOTES
Paternal Grandfather     Hypertension Paternal Grandfather     Anesth Problems Neg Hx     Clotting Disorder Neg Hx         Allergies   Allergen Reactions    Levofloxacin Rash    Oxycodone-Acetaminophen Nausea And Vomiting     \"It upsets my stomach.\"    Penicillins Rash        Assessment/Plan  Genet was seen today for diabetes.    Diagnoses and all orders for this visit:    Type 2 diabetes mellitus with unspecified complications (HCC)-not sure that she is still truly benefiting from Trulicity.  Will try to drop the dose to 1.5 mg.  Low threshold to increase dose again if appetite begins to increase.  -     Hemoglobin A1C; Future  -     Microalbumin / Creatinine Urine Ratio; Future  -     Basic Metabolic Panel; Future  -     CBC with Auto Differential; Future  -     dulaglutide (TRULICITY) 1.5 MG/0.5ML SC injection; Inject 0.5 mLs into the skin once a week    Anxiety disorder, unspecified type-mental health slightly declining due to factors at home.  We discussed that seeing a therapist to navigate the situations may be her best step forward.  Low threshold to increase SSRI dose if mood continues to deteriorate.    Hyperlipidemia, unspecified hyperlipidemia type-continue statin    Obesity, morbid (HCC)-heading in the right direction    Depression, unspecified depression type        No follow-up provider specified.    Power Conde MD  4/9/2024  Total face-to face time was 30+ minutes, greater than 50% of which was spent in counseling and coordination of care.  The diagnosis, treatment and various other items were discussed in detail: Test results, medication options, possible side effects, lifestyle changes    This note was created with the help of speech recognition software (Dragon) and may contain some 'sound alike' errors.

## 2024-04-25 ENCOUNTER — TRANSCRIBE ORDERS (OUTPATIENT)
Facility: HOSPITAL | Age: 59
End: 2024-04-25

## 2024-04-25 ENCOUNTER — HOSPITAL ENCOUNTER (OUTPATIENT)
Facility: HOSPITAL | Age: 59
End: 2024-04-25
Attending: OBSTETRICS & GYNECOLOGY
Payer: COMMERCIAL

## 2024-04-25 ENCOUNTER — HOSPITAL ENCOUNTER (OUTPATIENT)
Facility: HOSPITAL | Age: 59
Discharge: HOME | End: 2024-04-25
Attending: OBSTETRICS & GYNECOLOGY
Payer: COMMERCIAL

## 2024-04-25 VITALS — BODY MASS INDEX: 37.44 KG/M2 | WEIGHT: 225 LBS

## 2024-04-25 DIAGNOSIS — N64.89 BREAST ASYMMETRY: ICD-10-CM

## 2024-04-25 DIAGNOSIS — Z12.31 ENCOUNTER FOR SCREENING MAMMOGRAM FOR MALIGNANT NEOPLASM OF BREAST: Primary | ICD-10-CM

## 2024-04-25 DIAGNOSIS — E28.39 DECREASED ESTROGEN LEVEL: ICD-10-CM

## 2024-04-25 PROCEDURE — G0279 TOMOSYNTHESIS, MAMMO: HCPCS

## 2024-04-25 PROCEDURE — 77080 DXA BONE DENSITY AXIAL: CPT

## 2024-05-21 ENCOUNTER — OFFICE VISIT (OUTPATIENT)
Age: 59
End: 2024-05-21
Payer: COMMERCIAL

## 2024-05-21 VITALS
HEIGHT: 65 IN | OXYGEN SATURATION: 95 % | WEIGHT: 232.2 LBS | DIASTOLIC BLOOD PRESSURE: 79 MMHG | HEART RATE: 74 BPM | SYSTOLIC BLOOD PRESSURE: 126 MMHG | RESPIRATION RATE: 14 BRPM | BODY MASS INDEX: 38.69 KG/M2

## 2024-05-21 DIAGNOSIS — H93.8X1 SWELLING OF RIGHT EAR: Primary | ICD-10-CM

## 2024-05-21 PROCEDURE — 99213 OFFICE O/P EST LOW 20 MIN: CPT | Performed by: INTERNAL MEDICINE

## 2024-05-21 RX ORDER — PREDNISONE 10 MG/1
TABLET ORAL
Qty: 30 TABLET | Refills: 0 | Status: SHIPPED | OUTPATIENT
Start: 2024-05-21 | End: 2024-06-01

## 2024-05-21 ASSESSMENT — PATIENT HEALTH QUESTIONNAIRE - PHQ9
SUM OF ALL RESPONSES TO PHQ9 QUESTIONS 1 & 2: 0
2. FEELING DOWN, DEPRESSED OR HOPELESS: NOT AT ALL
SUM OF ALL RESPONSES TO PHQ QUESTIONS 1-9: 0
1. LITTLE INTEREST OR PLEASURE IN DOING THINGS: NOT AT ALL
SUM OF ALL RESPONSES TO PHQ QUESTIONS 1-9: 0

## 2024-06-21 DIAGNOSIS — F41.9 ANXIETY DISORDER, UNSPECIFIED: ICD-10-CM

## 2024-06-21 RX ORDER — BUPROPION HYDROCHLORIDE 300 MG/1
TABLET ORAL
Qty: 90 TABLET | Refills: 1 | Status: SHIPPED | OUTPATIENT
Start: 2024-06-21

## 2024-06-21 RX ORDER — SERTRALINE HYDROCHLORIDE 100 MG/1
TABLET, FILM COATED ORAL
Qty: 90 TABLET | Refills: 1 | Status: SHIPPED | OUTPATIENT
Start: 2024-06-21

## 2024-07-08 NOTE — PROGRESS NOTES
Message sent. NT suctioned x 2 right nare with sterile technique.Moderate amount thick secretions.  Pt has rhonchi bilaterally. Set up Yankauer at bedside. Spo2 93% on 7lpm hfnc.

## 2024-07-10 DIAGNOSIS — E78.5 HYPERLIPIDEMIA, UNSPECIFIED: ICD-10-CM

## 2024-07-10 RX ORDER — ATORVASTATIN CALCIUM 20 MG/1
TABLET, FILM COATED ORAL
Qty: 90 TABLET | Refills: 1 | Status: SHIPPED | OUTPATIENT
Start: 2024-07-10

## 2024-08-21 ENCOUNTER — OFFICE VISIT (OUTPATIENT)
Age: 59
End: 2024-08-21

## 2024-08-21 VITALS
DIASTOLIC BLOOD PRESSURE: 72 MMHG | RESPIRATION RATE: 16 BRPM | HEIGHT: 65 IN | BODY MASS INDEX: 39.65 KG/M2 | OXYGEN SATURATION: 95 % | SYSTOLIC BLOOD PRESSURE: 122 MMHG | TEMPERATURE: 98.3 F | HEART RATE: 81 BPM | WEIGHT: 238 LBS

## 2024-08-21 DIAGNOSIS — B35.1 FUNGAL TOENAIL INFECTION: ICD-10-CM

## 2024-08-21 DIAGNOSIS — L08.9 SOFT TISSUE INFECTION: Primary | ICD-10-CM

## 2024-08-21 RX ORDER — SULFAMETHOXAZOLE AND TRIMETHOPRIM 800; 160 MG/1; MG/1
1 TABLET ORAL 2 TIMES DAILY
Qty: 14 TABLET | Refills: 0 | Status: SHIPPED | OUTPATIENT
Start: 2024-08-21 | End: 2024-08-28

## 2024-08-21 NOTE — PROGRESS NOTES
Genet Stanton (:  1965) is a 59 y.o. female,New patient, here for evaluation of the following chief complaint(s):  Toe Pain (Pt pulled toenail off of 2nd toe nail on the R foot. One week ago. )      ASSESSMENT/PLAN:  Visit Diagnoses and Associated Orders       Fungal toenail infection    -  Primary    AFL - Jie, Esau, DPSAE, Podiatry, [place] [XSD021 Custom]           Soft tissue infection        sulfamethoxazole-trimethoprim (BACTRIM DS;SEPTRA DS) 800-160 MG per tablet [28397]                    Take antibiotic for soft tissue infection of toe.  Follow up with PCP in 7 days.  Follow up with podiatry as soon as possible. Referral given.   Follow up in 7 days if symptoms persist or if symptoms worsen.      SUBJECTIVE/OBJECTIVE:    History provided by:  Patient   used: No        59 y.o. female presents with symptoms of  toe infection of middle toe on right foot. It is edematous and erythematous. She is on Trulicity but she is not diabetic; she  is taking it for weight loss.           Physical Exam  Vitals and nursing note reviewed.   Constitutional:       General: She is not in acute distress.     Appearance: Normal appearance. She is normal weight. She is not ill-appearing, toxic-appearing or diaphoretic.   HENT:      Head: Normocephalic and atraumatic.   Musculoskeletal:         General: Swelling, tenderness and signs of injury present. No deformity. Normal range of motion.      Right lower leg: No edema.      Left lower leg: No edema.   Skin:     General: Skin is warm and dry.   Neurological:      General: No focal deficit present.      Mental Status: She is alert and oriented to person, place, and time.   Psychiatric:         Mood and Affect: Mood normal.         Behavior: Behavior normal.         Thought Content: Thought content normal.         Judgment: Judgment normal.           An electronic signature was used to authenticate this note.    Ramy Levin, SAMANTHA - CNP

## 2024-08-21 NOTE — PATIENT INSTRUCTIONS
Take antibiotic for soft tissue infection of toe.  Follow up with PCP in 7 days.  Follow up with podiatry as soon as possible. Referral given.

## 2024-11-19 ENCOUNTER — OFFICE VISIT (OUTPATIENT)
Facility: CLINIC | Age: 59
End: 2024-11-19
Payer: COMMERCIAL

## 2024-11-19 VITALS
WEIGHT: 243 LBS | RESPIRATION RATE: 16 BRPM | HEART RATE: 82 BPM | OXYGEN SATURATION: 96 % | SYSTOLIC BLOOD PRESSURE: 129 MMHG | BODY MASS INDEX: 40.48 KG/M2 | HEIGHT: 65 IN | DIASTOLIC BLOOD PRESSURE: 84 MMHG | TEMPERATURE: 98.4 F

## 2024-11-19 DIAGNOSIS — E78.5 HYPERLIPIDEMIA, UNSPECIFIED HYPERLIPIDEMIA TYPE: ICD-10-CM

## 2024-11-19 DIAGNOSIS — E66.9 OBESITY, UNSPECIFIED CLASS, UNSPECIFIED OBESITY TYPE, UNSPECIFIED WHETHER SERIOUS COMORBIDITY PRESENT: ICD-10-CM

## 2024-11-19 DIAGNOSIS — E11.8 TYPE 2 DIABETES MELLITUS WITH UNSPECIFIED COMPLICATIONS (HCC): ICD-10-CM

## 2024-11-19 DIAGNOSIS — F41.9 ANXIETY AND DEPRESSION: Primary | ICD-10-CM

## 2024-11-19 DIAGNOSIS — F32.A ANXIETY AND DEPRESSION: Primary | ICD-10-CM

## 2024-11-19 LAB
ALBUMIN SERPL-MCNC: 4 G/DL (ref 3.5–5)
ALBUMIN/GLOB SERPL: 1.2 (ref 1.1–2.2)
ALP SERPL-CCNC: 67 U/L (ref 45–117)
ALT SERPL-CCNC: 18 U/L (ref 12–78)
ANION GAP SERPL CALC-SCNC: 7 MMOL/L (ref 2–12)
AST SERPL-CCNC: 13 U/L (ref 15–37)
BILIRUB SERPL-MCNC: 0.4 MG/DL (ref 0.2–1)
BUN SERPL-MCNC: 17 MG/DL (ref 6–20)
BUN/CREAT SERPL: 22 (ref 12–20)
CALCIUM SERPL-MCNC: 9.7 MG/DL (ref 8.5–10.1)
CHLORIDE SERPL-SCNC: 105 MMOL/L (ref 97–108)
CHOLEST SERPL-MCNC: 261 MG/DL
CO2 SERPL-SCNC: 26 MMOL/L (ref 21–32)
CREAT SERPL-MCNC: 0.79 MG/DL (ref 0.55–1.02)
EST. AVERAGE GLUCOSE BLD GHB EST-MCNC: 111 MG/DL
GLOBULIN SER CALC-MCNC: 3.3 G/DL (ref 2–4)
GLUCOSE SERPL-MCNC: 92 MG/DL (ref 65–100)
HBA1C MFR BLD: 5.5 % (ref 4–5.6)
HDLC SERPL-MCNC: 70 MG/DL
HDLC SERPL: 3.7 (ref 0–5)
LDLC SERPL CALC-MCNC: 167.8 MG/DL (ref 0–100)
POTASSIUM SERPL-SCNC: 4.3 MMOL/L (ref 3.5–5.1)
PROT SERPL-MCNC: 7.3 G/DL (ref 6.4–8.2)
SODIUM SERPL-SCNC: 138 MMOL/L (ref 136–145)
TRIGL SERPL-MCNC: 116 MG/DL
TSH SERPL DL<=0.05 MIU/L-ACNC: 0.92 UIU/ML (ref 0.36–3.74)
VLDLC SERPL CALC-MCNC: 23.2 MG/DL

## 2024-11-19 PROCEDURE — 99214 OFFICE O/P EST MOD 30 MIN: CPT | Performed by: INTERNAL MEDICINE

## 2024-11-19 PROCEDURE — 3044F HG A1C LEVEL LT 7.0%: CPT | Performed by: INTERNAL MEDICINE

## 2024-11-19 RX ORDER — TOPIRAMATE 50 MG/1
50 TABLET, FILM COATED ORAL
Qty: 30 TABLET | Refills: 5 | Status: SHIPPED | OUTPATIENT
Start: 2024-11-19

## 2024-11-19 RX ORDER — DULAGLUTIDE 3 MG/.5ML
3 INJECTION, SOLUTION SUBCUTANEOUS WEEKLY
Qty: 2 ML | Refills: 3 | Status: SHIPPED | OUTPATIENT
Start: 2024-11-19

## 2024-11-19 SDOH — ECONOMIC STABILITY: FOOD INSECURITY: WITHIN THE PAST 12 MONTHS, THE FOOD YOU BOUGHT JUST DIDN'T LAST AND YOU DIDN'T HAVE MONEY TO GET MORE.: NEVER TRUE

## 2024-11-19 SDOH — ECONOMIC STABILITY: FOOD INSECURITY: WITHIN THE PAST 12 MONTHS, YOU WORRIED THAT YOUR FOOD WOULD RUN OUT BEFORE YOU GOT MONEY TO BUY MORE.: NEVER TRUE

## 2024-11-19 SDOH — ECONOMIC STABILITY: INCOME INSECURITY: HOW HARD IS IT FOR YOU TO PAY FOR THE VERY BASICS LIKE FOOD, HOUSING, MEDICAL CARE, AND HEATING?: NOT HARD AT ALL

## 2024-11-19 ASSESSMENT — ENCOUNTER SYMPTOMS
CHEST TIGHTNESS: 0
DIARRHEA: 0
CONSTIPATION: 0
SHORTNESS OF BREATH: 0
BACK PAIN: 0
ABDOMINAL PAIN: 0

## 2024-11-19 NOTE — PROGRESS NOTES
knee 2021    Visit for review of DEXA scan 2024    T+0.1 spine, T-0.4 hip. 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    WISDOM TOOTH EXTRACTION        Social History     Tobacco Use    Smoking status: Never    Smokeless tobacco: Never   Substance Use Topics    Alcohol use: Not Currently      Family History   Problem Relation Age of Onset    Gall Bladder Disease Mother     Dementia Mother     Other Mother         AAA    Lung Disease Father     Osteoarthritis Father     Stroke Father         x 2    Cancer Father         Bladder    Elevated Lipids Father     Osteoporosis Father     High Cholesterol Father     Other Father         AAA    Osteoarthritis Paternal Grandmother     Hypertension Paternal Grandmother     Stroke Paternal Grandfather     Hypertension Paternal Grandfather     Other Paternal Uncle         AAA    Other Maternal Uncle         AAA    Anesth Problems Neg Hx     Clotting Disorder Neg Hx         Allergies   Allergen Reactions    Levofloxacin Rash    Oxycodone-Acetaminophen Nausea And Vomiting     \"It upsets my stomach.\"    Penicillins Rash        Assessment/Plan  Genet was seen today for diabetes.    Diagnoses and all orders for this visit:    Anxiety and depression-continued stressors.  Mood is down.  Patient not interested in medication changes.  Urged her to take 2 hours a week to resolve.  Patient to search to see if she could find a

## 2024-11-20 DIAGNOSIS — F41.9 ANXIETY DISORDER, UNSPECIFIED: ICD-10-CM

## 2024-11-20 DIAGNOSIS — E78.5 HYPERLIPIDEMIA, UNSPECIFIED: ICD-10-CM

## 2024-11-21 RX ORDER — BUPROPION HYDROCHLORIDE 300 MG/1
TABLET ORAL
Qty: 90 TABLET | Refills: 1 | Status: SHIPPED | OUTPATIENT
Start: 2024-11-21

## 2024-11-21 RX ORDER — BUPROPION HYDROCHLORIDE 300 MG/1
300 TABLET ORAL EVERY MORNING
Qty: 90 TABLET | Refills: 1 | Status: SHIPPED | OUTPATIENT
Start: 2024-11-21

## 2024-11-21 RX ORDER — SERTRALINE HYDROCHLORIDE 100 MG/1
100 TABLET, FILM COATED ORAL DAILY
Qty: 90 TABLET | Refills: 1 | Status: SHIPPED | OUTPATIENT
Start: 2024-11-21

## 2024-11-21 RX ORDER — ATORVASTATIN CALCIUM 20 MG/1
20 TABLET, FILM COATED ORAL DAILY
Qty: 90 TABLET | Refills: 1 | Status: SHIPPED | OUTPATIENT
Start: 2024-11-21

## 2024-11-21 RX ORDER — SERTRALINE HYDROCHLORIDE 100 MG/1
TABLET, FILM COATED ORAL
Qty: 90 TABLET | Refills: 1 | Status: SHIPPED | OUTPATIENT
Start: 2024-11-21

## 2025-01-06 DIAGNOSIS — E78.5 HYPERLIPIDEMIA, UNSPECIFIED: ICD-10-CM

## 2025-01-06 DIAGNOSIS — E11.8 TYPE 2 DIABETES MELLITUS WITH UNSPECIFIED COMPLICATIONS (HCC): ICD-10-CM

## 2025-01-06 DIAGNOSIS — F41.9 ANXIETY DISORDER, UNSPECIFIED: ICD-10-CM

## 2025-01-06 DIAGNOSIS — E66.9 OBESITY, UNSPECIFIED CLASS, UNSPECIFIED OBESITY TYPE, UNSPECIFIED WHETHER SERIOUS COMORBIDITY PRESENT: ICD-10-CM

## 2025-01-07 RX ORDER — ATORVASTATIN CALCIUM 20 MG/1
20 TABLET, FILM COATED ORAL DAILY
Qty: 90 TABLET | Refills: 1 | Status: SHIPPED | OUTPATIENT
Start: 2025-01-07

## 2025-01-07 RX ORDER — BUPROPION HYDROCHLORIDE 300 MG/1
300 TABLET ORAL EVERY MORNING
Qty: 90 TABLET | Refills: 1 | Status: SHIPPED | OUTPATIENT
Start: 2025-01-07

## 2025-01-07 RX ORDER — SERTRALINE HYDROCHLORIDE 100 MG/1
100 TABLET, FILM COATED ORAL DAILY
Qty: 90 TABLET | Refills: 1 | Status: SHIPPED | OUTPATIENT
Start: 2025-01-07

## 2025-01-07 RX ORDER — DULAGLUTIDE 3 MG/.5ML
3 INJECTION, SOLUTION SUBCUTANEOUS WEEKLY
Qty: 2 ML | Refills: 3 | Status: SHIPPED | OUTPATIENT
Start: 2025-01-07

## 2025-01-07 RX ORDER — TOPIRAMATE 50 MG/1
50 TABLET, FILM COATED ORAL
Qty: 30 TABLET | Refills: 5 | Status: SHIPPED | OUTPATIENT
Start: 2025-01-07

## 2025-03-17 ENCOUNTER — OFFICE VISIT (OUTPATIENT)
Facility: CLINIC | Age: 60
End: 2025-03-17
Payer: COMMERCIAL

## 2025-03-17 VITALS
RESPIRATION RATE: 16 BRPM | HEART RATE: 79 BPM | SYSTOLIC BLOOD PRESSURE: 119 MMHG | WEIGHT: 231 LBS | BODY MASS INDEX: 38.49 KG/M2 | OXYGEN SATURATION: 95 % | TEMPERATURE: 98.5 F | DIASTOLIC BLOOD PRESSURE: 73 MMHG | HEIGHT: 65 IN

## 2025-03-17 DIAGNOSIS — M76.899 HAMSTRING TENDONITIS: ICD-10-CM

## 2025-03-17 DIAGNOSIS — F41.9 ANXIETY DISORDER, UNSPECIFIED TYPE: ICD-10-CM

## 2025-03-17 DIAGNOSIS — E11.8 TYPE 2 DIABETES MELLITUS WITH UNSPECIFIED COMPLICATIONS: Primary | ICD-10-CM

## 2025-03-17 DIAGNOSIS — E66.9 OBESITY, UNSPECIFIED CLASS, UNSPECIFIED OBESITY TYPE, UNSPECIFIED WHETHER SERIOUS COMORBIDITY PRESENT: ICD-10-CM

## 2025-03-17 DIAGNOSIS — E78.5 HYPERLIPIDEMIA, UNSPECIFIED HYPERLIPIDEMIA TYPE: ICD-10-CM

## 2025-03-17 PROCEDURE — 99214 OFFICE O/P EST MOD 30 MIN: CPT | Performed by: INTERNAL MEDICINE

## 2025-03-17 RX ORDER — DICLOFENAC SODIUM 75 MG/1
75 TABLET, DELAYED RELEASE ORAL 2 TIMES DAILY
Qty: 28 TABLET | Refills: 0 | Status: SHIPPED | OUTPATIENT
Start: 2025-03-17 | End: 2025-03-31

## 2025-03-17 SDOH — ECONOMIC STABILITY: FOOD INSECURITY: WITHIN THE PAST 12 MONTHS, YOU WORRIED THAT YOUR FOOD WOULD RUN OUT BEFORE YOU GOT MONEY TO BUY MORE.: NEVER TRUE

## 2025-03-17 SDOH — ECONOMIC STABILITY: FOOD INSECURITY: WITHIN THE PAST 12 MONTHS, THE FOOD YOU BOUGHT JUST DIDN'T LAST AND YOU DIDN'T HAVE MONEY TO GET MORE.: NEVER TRUE

## 2025-03-17 ASSESSMENT — ENCOUNTER SYMPTOMS
ABDOMINAL PAIN: 0
CONSTIPATION: 0
CHEST TIGHTNESS: 0
DIARRHEA: 0
BACK PAIN: 0
SHORTNESS OF BREATH: 0

## 2025-03-17 ASSESSMENT — PATIENT HEALTH QUESTIONNAIRE - PHQ9
2. FEELING DOWN, DEPRESSED OR HOPELESS: NOT AT ALL
SUM OF ALL RESPONSES TO PHQ QUESTIONS 1-9: 0
10. IF YOU CHECKED OFF ANY PROBLEMS, HOW DIFFICULT HAVE THESE PROBLEMS MADE IT FOR YOU TO DO YOUR WORK, TAKE CARE OF THINGS AT HOME, OR GET ALONG WITH OTHER PEOPLE: NOT DIFFICULT AT ALL
3. TROUBLE FALLING OR STAYING ASLEEP: NOT AT ALL
6. FEELING BAD ABOUT YOURSELF - OR THAT YOU ARE A FAILURE OR HAVE LET YOURSELF OR YOUR FAMILY DOWN: NOT AT ALL
7. TROUBLE CONCENTRATING ON THINGS, SUCH AS READING THE NEWSPAPER OR WATCHING TELEVISION: NOT AT ALL
SUM OF ALL RESPONSES TO PHQ QUESTIONS 1-9: 0
1. LITTLE INTEREST OR PLEASURE IN DOING THINGS: NOT AT ALL
SUM OF ALL RESPONSES TO PHQ QUESTIONS 1-9: 0
8. MOVING OR SPEAKING SO SLOWLY THAT OTHER PEOPLE COULD HAVE NOTICED. OR THE OPPOSITE, BEING SO FIGETY OR RESTLESS THAT YOU HAVE BEEN MOVING AROUND A LOT MORE THAN USUAL: NOT AT ALL
SUM OF ALL RESPONSES TO PHQ QUESTIONS 1-9: 0
5. POOR APPETITE OR OVEREATING: NOT AT ALL
9. THOUGHTS THAT YOU WOULD BE BETTER OFF DEAD, OR OF HURTING YOURSELF: NOT AT ALL
4. FEELING TIRED OR HAVING LITTLE ENERGY: NOT AT ALL

## 2025-03-17 NOTE — PROGRESS NOTES
Genet Stanton is a 59 y.o. female who presents today for Obesity (3-4 mo f/u;)  .      She has a history of   Patient Active Problem List   Diagnosis    Obesity, morbid    New onset of headaches after age 49    IFG (impaired fasting glucose)    Anxiety and depression    Hyperlipidemia, unspecified hyperlipidemia type    Type 2 diabetes mellitus with unspecified complications (HCC)   .    Today patient is here for follow up.     History of Present Illness  The patient is a 59-year-old female who presents for a routine follow-up. At the last visit, Topamax was added, and her Trulicity dose was increased to see if we could get some benefits in her weight. Weight today is down about 15 pounds. She is due to repeat her lipid panel that had trended at the last visit.    She reports a rapid initial weight loss, which has since plateaued. She attributes this to a decrease in appetite following the addition of Topamax and an increase in her Trulicity dosage in November. Despite tolerating these medications well, she expresses frustration at her inability to lose more weight. She acknowledges a lack of physical activity and believes that increasing her output could potentially aid in further weight loss. Her mood remains stable, and she does not feel the need for any adjustments in this area. She also reports a reduction in food cravings. She has been compliant with her Lipitor regimen.    She has been experiencing severe pain for the past 4 weeks, predominantly when seated, but also during standing and walking. The pain radiates from her left side down her leg and is present daily. She reports no contraindications to anti-inflammatory medications.    MEDICATIONS  Current: Topamax, Trulicity, Lipitor        ROS  Review of Systems   Constitutional:  Negative for fatigue and fever.   HENT:  Negative for congestion and ear pain.    Respiratory:  Negative for chest tightness and shortness of breath.    Cardiovascular:  Negative

## 2025-05-22 DIAGNOSIS — E66.9 OBESITY, UNSPECIFIED CLASS, UNSPECIFIED OBESITY TYPE, UNSPECIFIED WHETHER SERIOUS COMORBIDITY PRESENT: ICD-10-CM

## 2025-05-23 RX ORDER — TOPIRAMATE 50 MG/1
50 TABLET, FILM COATED ORAL
Qty: 90 TABLET | Refills: 1 | Status: SHIPPED | OUTPATIENT
Start: 2025-05-23

## 2025-05-23 NOTE — TELEPHONE ENCOUNTER
Last visit 3/17/25  Follow up 9/17/25    Lab Results   Component Value Date     11/19/2024    K 4.3 11/19/2024     11/19/2024    CO2 26 11/19/2024    BUN 17 11/19/2024    CREATININE 0.79 11/19/2024    GLUCOSE 92 11/19/2024    CALCIUM 9.7 11/19/2024    BILITOT 0.4 11/19/2024    ALKPHOS 67 11/19/2024    AST 13 (L) 11/19/2024    ALT 18 11/19/2024    LABGLOM 86 11/19/2024    GFRAA >60 09/01/2022    AGRATIO 1.2 03/07/2023    GLOB 3.3 11/19/2024

## (undated) DEVICE — KIT DRP FOR RIO ROBOTIC ARM ASST SYS

## (undated) DEVICE — DECANTER BAG 9": Brand: MEDLINE INDUSTRIES, INC.

## (undated) DEVICE — HOOD: Brand: FLYTE

## (undated) DEVICE — HANDPIECE SET WITH COAXIAL HIGH FLOW TIP AND SUCTION TUBE: Brand: INTERPULSE

## (undated) DEVICE — BANDAGE COMPR W6INXL10YD ST M E WHITE/BEIGE

## (undated) DEVICE — MARKER RAD KNEE TIB CKPT STEREOTAXIC IMAG LESION LOC

## (undated) DEVICE — PREP KIT PEEL PTCH POVIDONE IOD

## (undated) DEVICE — STERILE POLYISOPRENE POWDER-FREE SURGICAL GLOVES: Brand: PROTEXIS

## (undated) DEVICE — PENCIL SMK EVAC L10FT DIA95MM TBNG NONSTICK W ADPT TO 22MM

## (undated) DEVICE — REM POLYHESIVE ADULT PATIENT RETURN ELECTRODE: Brand: VALLEYLAB

## (undated) DEVICE — SOL IRR SOD CL 0.9% 3000ML --

## (undated) DEVICE — TAPE,CLOTH/SILK,CURAD,3"X10YD,LF,40/CS: Brand: CURAD

## (undated) DEVICE — Device

## (undated) DEVICE — BANDAGE COBAN 6 IN WND 6INX5YD FOAM

## (undated) DEVICE — SYR LR LCK 1ML GRAD NSAF 30ML --

## (undated) DEVICE — SUTURE MCRYL SZ 3-0 L27IN ABSRB UD L24MM PS-1 3/8 CIR PRIM Y936H

## (undated) DEVICE — PAD NON-ADHERENT 3X4 STRL LF --

## (undated) DEVICE — SUTURE STRATAFIX SPRL SZ 1 L14IN ABSRB VLT L48CM CTX 1/2 SXPD2B405

## (undated) DEVICE — 3M™ TEGADERM™ TRANSPARENT FILM DRESSING FRAME STYLE, 1624W, 2-3/8 IN X 2-3/4 IN (6 CM X 7 CM), 100/CT 4CT/CASE: Brand: 3M™ TEGADERM™

## (undated) DEVICE — MARKER RAD KNEE FEM CKPT STEREOTAXIC IMAG LESION LOC

## (undated) DEVICE — SUTURE VCRL + SZ 1-0 L36IN ABSRB UD CTX 1/2 CIR TAPR PNT VCP977H

## (undated) DEVICE — INTENDED FOR TISSUE SEPARATION, AND OTHER PROCEDURES THAT REQUIRE A SHARP SURGICAL BLADE TO PUNCTURE OR CUT.: Brand: BARD-PARKER ® CARBON RIB-BACK BLADES

## (undated) DEVICE — STERILE POLYISOPRENE POWDER-FREE SURGICAL GLOVES WITH EMOLLIENT COATING: Brand: PROTEXIS

## (undated) DEVICE — PAD,ABDOMINAL,5"X9",ST,LF,25/BX: Brand: MEDLINE INDUSTRIES, INC.

## (undated) DEVICE — INFECTION CONTROL KIT SYS

## (undated) DEVICE — KIT PROC KNE TRACKING PK/1 -- VIZADISC MAKO

## (undated) DEVICE — PIN BNE 4X110MM STRL -- 2/PK MAKO

## (undated) DEVICE — DRAPE,EXTREMITY,89X128,STERILE: Brand: MEDLINE

## (undated) DEVICE — 4-PORT MANIFOLD: Brand: NEPTUNE 2

## (undated) DEVICE — MARKER,SKIN,WI/RULER AND LABELS: Brand: MEDLINE

## (undated) DEVICE — SPONGE GZ W4XL4IN COT 12 PLY TYP VII WVN C FLD DSGN

## (undated) DEVICE — NEEDLE HYPO 18GA L1.5IN PNK S STL HUB POLYPR SHLD REG BVL

## (undated) DEVICE — PREP SKN CHLRAPRP APL 26ML STR --

## (undated) DEVICE — ZIMMER® STERILE DISPOSABLE TOURNIQUET CUFF WITH PROTECTIVE SLEEVE AND PLC, DUAL PORT, SINGLE BLADDER, 34 IN. (86 CM)

## (undated) DEVICE — SUTURE VCRL SZ 2-0 L36IN ABSRB UD L36MM CT-1 1/2 CIR J945H

## (undated) DEVICE — 3M™ STERI-DRAPE™ U-DRAPE 1015: Brand: STERI-DRAPE™

## (undated) DEVICE — GOWN,PREVENTION PLUS,XLN/2XL,ST,22/CS: Brand: MEDLINE

## (undated) DEVICE — STRAP,POSITIONING,KNEE/BODY,FOAM,4X60": Brand: MEDLINE

## (undated) DEVICE — BLADE SURG SAW STD S STL OSC W/ SERR EDGE DISP

## (undated) DEVICE — PADDING CST 6IN STERILE --

## (undated) DEVICE — STRYKER PERFORMANCE SERIES SAGITTAL BLADE: Brand: STRYKER PERFORMANCE SERIES

## (undated) DEVICE — 3M™ IOBAN™ 2 ANTIMICROBIAL INCISE DRAPE 6648EZ: Brand: IOBAN™ 2

## (undated) DEVICE — COVER LT HNDL PLAS RIG 1 PER PK

## (undated) DEVICE — DRESSING FOAM W4XL10IN AG SIL ADH ANTIMIC POSTOP OPTIFOAM

## (undated) DEVICE — 1010 S-DRAPE TOWEL DRAPE 10/BX: Brand: STERI-DRAPE™